# Patient Record
Sex: MALE | Race: WHITE | NOT HISPANIC OR LATINO | Employment: OTHER | URBAN - METROPOLITAN AREA
[De-identification: names, ages, dates, MRNs, and addresses within clinical notes are randomized per-mention and may not be internally consistent; named-entity substitution may affect disease eponyms.]

---

## 2017-06-29 ENCOUNTER — HOSPITAL ENCOUNTER (OUTPATIENT)
Facility: MEDICAL CENTER | Age: 82
End: 2017-06-29
Attending: INTERNAL MEDICINE
Payer: MEDICARE

## 2017-06-29 LAB
BASOPHILS # BLD AUTO: 0.3 % (ref 0–1.8)
BASOPHILS # BLD: 0.02 K/UL (ref 0–0.12)
EOSINOPHIL # BLD AUTO: 0.04 K/UL (ref 0–0.51)
EOSINOPHIL NFR BLD: 0.6 % (ref 0–6.9)
ERYTHROCYTE [DISTWIDTH] IN BLOOD BY AUTOMATED COUNT: 52.8 FL (ref 35.9–50)
HCT VFR BLD AUTO: 40.7 % (ref 42–52)
HGB BLD-MCNC: 13.1 G/DL (ref 14–18)
IMM GRANULOCYTES # BLD AUTO: 0.06 K/UL (ref 0–0.11)
IMM GRANULOCYTES NFR BLD AUTO: 0.8 % (ref 0–0.9)
LYMPHOCYTES # BLD AUTO: 0.56 K/UL (ref 1–4.8)
LYMPHOCYTES NFR BLD: 7.9 % (ref 22–41)
MCH RBC QN AUTO: 31 PG (ref 27–33)
MCHC RBC AUTO-ENTMCNC: 32.2 G/DL (ref 33.7–35.3)
MCV RBC AUTO: 96.4 FL (ref 81.4–97.8)
MONOCYTES # BLD AUTO: 0.3 K/UL (ref 0–0.85)
MONOCYTES NFR BLD AUTO: 4.2 % (ref 0–13.4)
NEUTROPHILS # BLD AUTO: 6.13 K/UL (ref 1.82–7.42)
NEUTROPHILS NFR BLD: 86.2 % (ref 44–72)
NRBC # BLD AUTO: 0 K/UL
NRBC BLD AUTO-RTO: 0 /100 WBC
PLATELET # BLD AUTO: 21 K/UL (ref 164–446)
PMV BLD AUTO: 12.3 FL (ref 9–12.9)
RBC # BLD AUTO: 4.22 M/UL (ref 4.7–6.1)
WBC # BLD AUTO: 7.1 K/UL (ref 4.8–10.8)

## 2017-06-29 PROCEDURE — 85025 COMPLETE CBC W/AUTO DIFF WBC: CPT

## 2020-02-28 ENCOUNTER — HOSPITAL ENCOUNTER (INPATIENT)
Facility: REHABILITATION | Age: 85
LOS: 15 days | DRG: 057 | End: 2020-03-14
Attending: PHYSICAL MEDICINE & REHABILITATION | Admitting: PHYSICAL MEDICINE & REHABILITATION
Payer: MEDICARE

## 2020-02-28 PROBLEM — Z87.39 HISTORY OF GOUT: Status: ACTIVE | Noted: 2020-02-28

## 2020-02-28 PROBLEM — I63.331 CEREBROVASCULAR ACCIDENT (CVA) DUE TO THROMBOSIS OF RIGHT POSTERIOR CEREBRAL ARTERY (HCC): Status: ACTIVE | Noted: 2020-02-28

## 2020-02-28 PROBLEM — I10 ESSENTIAL HYPERTENSION: Status: ACTIVE | Noted: 2020-02-28

## 2020-02-28 PROBLEM — Z86.2 HISTORY OF ITP: Status: ACTIVE | Noted: 2020-02-28

## 2020-02-28 PROCEDURE — 99223 1ST HOSP IP/OBS HIGH 75: CPT | Mod: AI | Performed by: PHYSICAL MEDICINE & REHABILITATION

## 2020-02-28 PROCEDURE — A9270 NON-COVERED ITEM OR SERVICE: HCPCS | Performed by: PHYSICAL MEDICINE & REHABILITATION

## 2020-02-28 PROCEDURE — 770010 HCHG ROOM/CARE - REHAB SEMI PRIVAT*

## 2020-02-28 PROCEDURE — 700111 HCHG RX REV CODE 636 W/ 250 OVERRIDE (IP): Performed by: PHYSICAL MEDICINE & REHABILITATION

## 2020-02-28 PROCEDURE — 700102 HCHG RX REV CODE 250 W/ 637 OVERRIDE(OP): Performed by: PHYSICAL MEDICINE & REHABILITATION

## 2020-02-28 RX ORDER — HYDROXYZINE HYDROCHLORIDE 25 MG/1
50 TABLET, FILM COATED ORAL EVERY 6 HOURS PRN
Status: DISCONTINUED | OUTPATIENT
Start: 2020-02-28 | End: 2020-03-14 | Stop reason: HOSPADM

## 2020-02-28 RX ORDER — POLYETHYLENE GLYCOL 3350 17 G/17G
1 POWDER, FOR SOLUTION ORAL
Status: DISCONTINUED | OUTPATIENT
Start: 2020-02-28 | End: 2020-03-02

## 2020-02-28 RX ORDER — HYDRALAZINE HYDROCHLORIDE 25 MG/1
25 TABLET, FILM COATED ORAL EVERY 8 HOURS PRN
Status: DISCONTINUED | OUTPATIENT
Start: 2020-02-28 | End: 2020-03-14 | Stop reason: HOSPADM

## 2020-02-28 RX ORDER — TRAZODONE HYDROCHLORIDE 50 MG/1
50 TABLET ORAL
Status: DISCONTINUED | OUTPATIENT
Start: 2020-02-28 | End: 2020-03-09

## 2020-02-28 RX ORDER — ACETAMINOPHEN 325 MG/1
650 TABLET ORAL EVERY 4 HOURS PRN
Status: DISCONTINUED | OUTPATIENT
Start: 2020-02-28 | End: 2020-03-14 | Stop reason: HOSPADM

## 2020-02-28 RX ORDER — POLYVINYL ALCOHOL 14 MG/ML
1 SOLUTION/ DROPS OPHTHALMIC PRN
Status: DISCONTINUED | OUTPATIENT
Start: 2020-02-28 | End: 2020-03-14 | Stop reason: HOSPADM

## 2020-02-28 RX ORDER — ECHINACEA PURPUREA EXTRACT 125 MG
2 TABLET ORAL PRN
Status: DISCONTINUED | OUTPATIENT
Start: 2020-02-28 | End: 2020-03-14 | Stop reason: HOSPADM

## 2020-02-28 RX ORDER — LANOLIN ALCOHOL/MO/W.PET/CERES
3 CREAM (GRAM) TOPICAL NIGHTLY PRN
Status: DISCONTINUED | OUTPATIENT
Start: 2020-02-28 | End: 2020-03-09

## 2020-02-28 RX ORDER — AMOXICILLIN 250 MG
2 CAPSULE ORAL 2 TIMES DAILY
Status: DISCONTINUED | OUTPATIENT
Start: 2020-02-28 | End: 2020-03-02

## 2020-02-28 RX ORDER — ONDANSETRON 2 MG/ML
4 INJECTION INTRAMUSCULAR; INTRAVENOUS 4 TIMES DAILY PRN
Status: DISCONTINUED | OUTPATIENT
Start: 2020-02-28 | End: 2020-03-14 | Stop reason: HOSPADM

## 2020-02-28 RX ORDER — LACTULOSE 20 G/30ML
30 SOLUTION ORAL
Status: DISCONTINUED | OUTPATIENT
Start: 2020-02-28 | End: 2020-03-14 | Stop reason: HOSPADM

## 2020-02-28 RX ORDER — ALUMINA, MAGNESIA, AND SIMETHICONE 2400; 2400; 240 MG/30ML; MG/30ML; MG/30ML
20 SUSPENSION ORAL
Status: DISCONTINUED | OUTPATIENT
Start: 2020-02-28 | End: 2020-03-14 | Stop reason: HOSPADM

## 2020-02-28 RX ORDER — ONDANSETRON 4 MG/1
4 TABLET, ORALLY DISINTEGRATING ORAL 4 TIMES DAILY PRN
Status: DISCONTINUED | OUTPATIENT
Start: 2020-02-28 | End: 2020-03-14 | Stop reason: HOSPADM

## 2020-02-28 RX ORDER — BISACODYL 10 MG
10 SUPPOSITORY, RECTAL RECTAL
Status: DISCONTINUED | OUTPATIENT
Start: 2020-02-28 | End: 2020-03-02

## 2020-02-28 RX ORDER — ALLOPURINOL 300 MG/1
300 TABLET ORAL DAILY
Status: DISCONTINUED | OUTPATIENT
Start: 2020-02-29 | End: 2020-03-14 | Stop reason: HOSPADM

## 2020-02-28 RX ADMIN — SENNOSIDES AND DOCUSATE SODIUM 2 TABLET: 8.6; 5 TABLET ORAL at 21:04

## 2020-02-28 RX ADMIN — ENOXAPARIN SODIUM 40 MG: 100 INJECTION SUBCUTANEOUS at 21:05

## 2020-02-28 NOTE — H&P
REHABILITATION HISTORY AND PHYSICAL/POST ADMISSION EVALUATION    2/28/2020  3:17 PM  Chilo Cabrera  [id number]  Admission  2/28/2020  2:11 PM  C Code/Reason for admission: 0.1.1 (L) Body Involvement (R) Brain   Etiologic diagnosis/problem: Cerebrovascular accident (CVA) due to thrombosis of right posterior cerebral artery (HCC)  Chief Complaint: visual trouble    HPI:  The patient is a 84 y.o. male with a past medical history of immune thrombocytopenia, TIAs, carotid artery stenosis, hypertension, cataracts; now admitted for acute inpatient rehabilitation with severe functional debility from acute stroke.      On admission the patient and medical record report initially presented to the hospital on 2/21 with complaints of weakness, was diagnosed with a UTI and sent home with antibiotics.  He returned 3 days later on 2/24 with gait difficulty and visual complaints for which she had a stroke work-up.  MRI showed an acute stroke in the right PCA territory affecting the right temporal and parietal lobes as well as a small area of the right thalamus.  His NIH stroke scale was 10.  An echocardiogram that showed an ejection fraction of 60% and grade 1 diastolic dysfunction.  EKG was negative for atrial fibrillation.  LDL of 48, hemoglobin A1c was not checked. He was treated with IV ceftriaxone for UTI. He is on atenolol at home for hypertension, but was only on PRN hydralazine at [facility].    Patient current reports trouble with his vision but he can't be more specific. He is a poor historian. He is left hand dominant. He doesn't report any weakness or paresthesias. He does wear reading glasses and dentures. He denies being hard of hearing and doesn't have hearing aides. He denies any pain. He hasn't moved his bowels since he was hospitalized, no issues at home with constipation. He denies dysuria, though just had a bladder accident.     Patient was evaluated by Rehab Medicine physician and Physical Therapy and  Occupational Therapy and determined to be appropriate for acute inpatient rehab and was transferred to [facility] on 2020  2:11 PM.    With this acute therapeutic intervention, this patient hopes to improve his functional status, and return to independent living with the supportive care of spouse.    REVIEW OF SYSTEMS:     A complete review of systems was performed and was negative in detail with the exception of items mentioned elsewhere in this document.    PMH:  Past Medical History:   Diagnosis Date   • Carotid artery stenosis    • Cataracts, bilateral    • Gout    • History of ITP    • HTN (hypertension)    • Hypertension    • Immune thrombocytopenia (HCC)    • TIA (transient ischemic attack)        PSH:  Past Surgical History:   Procedure Laterality Date   • CAROTID ENDARTERECTOMY     Denies any other surgeries      Family History   Problem Relation Age of Onset   • Stroke Father     Mother  of old age.    MEDICATIONS:  Current Facility-Administered Medications   Medication Dose   • Respiratory Therapy Consult     • Pharmacy Consult Request ...Pain Management Review 1 Each  1 Each   • acetaminophen (TYLENOL) tablet 650 mg  650 mg   • hydrALAZINE (APRESOLINE) tablet 25 mg  25 mg   • senna-docusate (PERICOLACE or SENOKOT S) 8.6-50 MG per tablet 2 Tab  2 Tab    And   • polyethylene glycol/lytes (MIRALAX) PACKET 1 Packet  1 Packet    And   • magnesium hydroxide (MILK OF MAGNESIA) suspension 30 mL  30 mL    And   • bisacodyl (DULCOLAX) suppository 10 mg  10 mg   • artificial tears ophthalmic solution 1 Drop  1 Drop   • benzocaine-menthol (CEPACOL) lozenge 1 Lozenge  1 Lozenge   • mag hydrox-al hydrox-simeth (MAALOX PLUS ES or MYLANTA DS) suspension 20 mL  20 mL   • ondansetron (ZOFRAN ODT) dispertab 4 mg  4 mg    Or   • ondansetron (ZOFRAN) syringe/vial injection 4 mg  4 mg   • traZODone (DESYREL) tablet 50 mg  50 mg   • sodium chloride (OCEAN) 0.65 % nasal spray 2 Spray  2 Spray   •  "hydrOXYzine HCl (ATARAX) tablet 50 mg  50 mg   • melatonin tablet 3 mg  3 mg   • enoxaparin (LOVENOX) inj 40 mg  40 mg   • lactulose 20 GM/30ML solution 30 mL  30 mL   • docusate sodium (ENEMEEZ) enema 283 mg  283 mg   • [START ON 2/29/2020] Eltrombopag Olamine TABS 50 mg  1 Tab       ALLERGIES:  Pcn [penicillins]    PSYCHOSOCIAL HISTORY:  Pre-mobidly, the patient lived in a one level home with 2 steps to enter, in [place] with spouse.     Patient quit tobacco 37 years ago, drinks 3-4 shots of lliquor either daily or weekly, poor historian, no drugs.    He used to work as a  at [employer].     He was in the [employer]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. 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 He was in combat in [place].     LEVEL OF FUNCTION PRIOR TO DISABILTY:  Independent    LEVEL OF FUNCTION PRIOR TO ADMISSION to [facility]:  Min - mod assist transfers   Ambulates 10 ft with FWW  Mod assist UBD  Max assist LBD  Min assist toileting     CURRENT LEVEL OF FUNCTION:   Same as level of function prior to admission to [facility]    PHYSICAL EXAM:     VITAL SIGNS:   height is 1.951 m (6' 4.8\") and weight is 75 kg (165 lb 5.5 oz). His oral temperature is 36.4 °C (97.6 °F). His blood pressure is 136/75 and his pulse is 84. His respiration is 18 and oxygen saturation is 95%.     GENERAL: No apparent distress, frail, dishelved  HEENT: Normocephalic/atraumatic, PERRL, right lid ptosis, and No nystagmus  CARDIAC: Regular rate and rhythm, normal S1, S2, no murmurs, no peripheral edema   LUNGS: Clear to auscultation, normal respiratory effort, on room air   ABDOMINAL: bowel sounds present, soft, nontender and nondistended    EXTREMITIES: no edema or no calf tenderness bilaterally  MSK: No joint swelling    NEURO:    Mental status: alert, oriented to reason for admission  Speech: fluent, no aphasia or dysarthria    CRANIAL NERVES:  2,3: PERRL, left visual field cut to " midline  3,4,6: EOMI bilaterally, no nystagmus or diplopia  5: intact in all branches  7: mild decreased left nasolabial fold  8: hearing grossly intact, hard to hear softer sounds  9,10: symmetric palate elevation  11: SCM/Trapezius strength 5/5 bilaterally  12: tongue protrudes midline    Motor:  Shoulder flexors:  Right -  5/5, Left -  5/5  Elbow flexors:  Right -  5/5, Left -  5/5  Elbow extensors:  Right -  5/5, Left -  5/5  Symmetrical   Hip flexors:  Right -  5/5, Left -  5/5  Knee ext:  Right -  5/5, Left -  5/5  Dorsiflexors:  Right -  5/5, Left -  5/5  EHL:  Right -  5/5, Left -  5/5  Plantar flexors:  Right -  5/5, Left -  5/5   Positive left Pronator drift    Sensory:   intact to light touch through out    RADIOLOGY:    MRI report from outside hospital with R parietal/occipital/thalamic stroke in the right PCA territory.           PRIMARY REHAB DIAGNOSIS:    This patient is a 84 y.o. male admitted for acute inpatient rehabilitation with Cerebrovascular accident (CVA) due to thrombosis of right posterior cerebral artery (HCC).    IMPAIRMENTS:   Cognitive  ADLs/IADLs  Mobility    SECONDARY DIAGNOSIS/MEDICAL CO-MORBIDITIES AFFECTING FUNCTION:    Hypertension  Immune thrombocytopenia  Grade 1 diastolic dysfunction  History of gout    RELEVANT CHANGES SINCE PREADMISSION EVALUATION:    Status unchanged    The patient's rehabilitation potential is Good  The patient's medical prognosis is good    PLAN:   Discussion and Recommendations, discussed with the patient and/or family:   1. The patient requires an acute inpatient rehabilitation program with a coordinated program of care at an intensity and frequency not available at a lower level of care. This recommendation is substantiated by the patient's medical physicians who recommend that the patient's intervention and assessment of medical issues needs to be done at an acute level of care for patient's safety and maximum outcome.     2. A coordinated program  of care will be supplied by an interdisciplinary team of physical therapy, occupational therapy, rehab physician, rehab nursing, and, if needed, speech therapy and rehab psychology. Rehab team presents a patient-specific rehabilitation and education program concentrating on prevention of future problems related to accessibility, mobility, skin, bowel, bladder, sexuality, and psychosocial and medical/surgical problems.     3. Need for Rehabilitation Physician: The rehab physician will be evaluating the patient on a multi-weekly basis to help coordinate the program of care. The rehab physician communicates between medical physicians, therapists, and nurses to maximize the patient's potential outcome. Specific areas in which the rehab physician will be providing daily assessment include the following:   A. Assessing the patient's heart rate and blood pressure response (vitals monitoring) to activity and making adjustments in medications or conservative measures as needed.   B. The rehab physician will be assessing the frequency at which the program can be increased to allow the patient to reach optimal functional outcome.   C. The rehab physician will also provide assessments in daily skin care, especially in light of patient's impairments in mobility.   D. The rehab physician will provide special expertise in understanding how to work with functional impairment and recommend appropriate interventions, compensatory techniques, and education that will facilitate the patient's outcome.     4. Rehab R.N.   The rehab RN will be working with patient to carry over in room mobility and activities of daily living when the patient is not in 3 hours of skilled therapy. Rehab nursing will be working in conjunction with rehab physician to address all the medical issues above and continue to assess laboratory work and discuss abnormalities with the treating physicians, assess vitals, and response to activity, and discuss and report  abnormalities with the rehab physician. Rehab RN will also continue daily skin care, supervise bladder/bowel program, instruct in medication administration, and ensure patient safety.     5. Therapies to treat at intensity and frequency of (may change after completion of evaluation by all therapeutic disciplines):       PT:  Physical therapy to address mobility, transfer, gait training and evaluation for adaptive equipment needs 1hour/day at least 5 days/week for the duration of the ELOS (see below)       OT:  Occupational therapy to address ADLs, self-care, home management training, functional mobility/transfers and assistive device evaluation, and community re-integration 1hour/day at least 5 days/week for the duration of the ELOS (see below).        ST/Dysphagia:  Speech therapy to address speech, language, and cognitive deficits as well as swallowing difficulties with retraining/dysphagia management and community re-integration with comprehension, expression, cognitive training 1hour/day at least 5 days/week for the duration of the ELOS (see below).     6. Medical management / Rehabilitation Issues/Adverse Potential affecting function as part of rehabilitation plan.    Hypertension  Not currently on medications  Was on atenolol at home  Monitor need to add back    Immune thrombocytopenia  Continue home medication, Eltrombag 50 mg QD  Outpatient follow up with Dr. Abdalla    Grade 1 diastolic dysfunction  Monitor fluid status    History of gout  Continue allopurinol     I performed a complete drug regimen review and did not identify any potential clinically significant medication issues.    The patient's CODE STATUS was confirmed as FULL CODE with the patient at bedside.     REHABILITATION ISSUES/ADVERSE POTENTIAL:  1.  CVA (Cerebrovascular Accident): Cont full dose aspirin for secondary prophylaxis as well as lipid and blood pressure management. Patient demonstrates functional deficits in strength, balance,  coordination, and ADL's. Patient is admitted to [facility] for comprehensive rehabilitation therapy as described below.   Rehabilitation nursing monitors bowel and bladder control, educates on medication administration, co-morbidities and monitors patient safety.    2.  DVT prophylaxis:  Patient is on Lovenox for anticoagulation upon transfer. Encourage OOB. Monitor daily for signs and symptoms of DVT including but not limited to swelling and pain to prevent the development of DVT that may interfere with therapies.    3.  Pain: No issues with pain currently / Controlled with as needed oral analgesics.    4.  Nutrition/Dysphagia: Dietician monitors nutrient intake, recommend supplements prn and provide nutrition education to pt/family to promote optimal nutrition for wound healing/recovery.     5.  Bladder/bowel:  Start bowel and bladder program, to prevent constipation, urinary retention (which may lead to UTI), and urinary incontinence (which will impact upon pt's functional independence).   - TV Q3h while awake with post void bladder scans, I&O cath for PVRs >400  - up to commode after meal     6.  Skin/dermal ulcer prophylaxis: Monitor for new skin conditions with q.2 h. turns as required to prevent the development of skin breakdown.     7.  Cognition/Behavior:  Psychologist Dr. Sutton provides adjustment counseling to illness and psychosocial barriers that may be potential barriers to rehabilitation.     8. Respiratory therapy: RT performs O2 management prn, breathing retraining, pulmonary hygiene and bronchospasm management prn to optimize participation in therapies.    Pt was seen today for 72 min, and entire time spent in face-to-face contact was >50% in counseling and coordination of care as detailed in A/P above.        GOALS/EXPECTED LEVEL OF FUNCTION BASED ON CURRENT MEDICAL AND FUNCTIONAL STATUS (may change based on patient's medical status and rate of impairment  recovery):  Transfers:   Supervision  Mobility/Gait:   Supervision  ADL's:   Minimal Assistance  Cognition:  Supervision    DISPOSITION: Discharge to pre-morbid independent living setting with the supportive care of patient's spouse.      ELOS: 14-21 days    Elba Chase M.D.  Physical Medicine and Rehabilitation

## 2020-02-29 LAB
25(OH)D3 SERPL-MCNC: 15 NG/ML (ref 30–100)
ALBUMIN SERPL BCP-MCNC: 3.7 G/DL (ref 3.2–4.9)
ALBUMIN/GLOB SERPL: 1.6 G/DL
ALP SERPL-CCNC: 69 U/L (ref 30–99)
ALT SERPL-CCNC: 15 U/L (ref 2–50)
ANION GAP SERPL CALC-SCNC: 7 MMOL/L (ref 0–11.9)
AST SERPL-CCNC: 31 U/L (ref 12–45)
BASOPHILS # BLD AUTO: 1 % (ref 0–1.8)
BASOPHILS # BLD: 0.05 K/UL (ref 0–0.12)
BILIRUB SERPL-MCNC: 1.6 MG/DL (ref 0.1–1.5)
BUN SERPL-MCNC: 13 MG/DL (ref 8–22)
CALCIUM SERPL-MCNC: 8.9 MG/DL (ref 8.5–10.5)
CHLORIDE SERPL-SCNC: 105 MMOL/L (ref 96–112)
CO2 SERPL-SCNC: 26 MMOL/L (ref 20–33)
CREAT SERPL-MCNC: 0.88 MG/DL (ref 0.5–1.4)
EOSINOPHIL # BLD AUTO: 0.18 K/UL (ref 0–0.51)
EOSINOPHIL NFR BLD: 3.6 % (ref 0–6.9)
ERYTHROCYTE [DISTWIDTH] IN BLOOD BY AUTOMATED COUNT: 49.2 FL (ref 35.9–50)
EST. AVERAGE GLUCOSE BLD GHB EST-MCNC: 111 MG/DL
GLOBULIN SER CALC-MCNC: 2.3 G/DL (ref 1.9–3.5)
GLUCOSE SERPL-MCNC: 93 MG/DL (ref 65–99)
HBA1C MFR BLD: 5.5 % (ref 0–5.6)
HCT VFR BLD AUTO: 39.6 % (ref 42–52)
HGB BLD-MCNC: 13.8 G/DL (ref 14–18)
IMM GRANULOCYTES # BLD AUTO: 0.02 K/UL (ref 0–0.11)
IMM GRANULOCYTES NFR BLD AUTO: 0.4 % (ref 0–0.9)
LYMPHOCYTES # BLD AUTO: 0.86 K/UL (ref 1–4.8)
LYMPHOCYTES NFR BLD: 17.4 % (ref 22–41)
MAGNESIUM SERPL-MCNC: 1.7 MG/DL (ref 1.5–2.5)
MCH RBC QN AUTO: 32.2 PG (ref 27–33)
MCHC RBC AUTO-ENTMCNC: 34.8 G/DL (ref 33.7–35.3)
MCV RBC AUTO: 92.3 FL (ref 81.4–97.8)
MONOCYTES # BLD AUTO: 0.35 K/UL (ref 0–0.85)
MONOCYTES NFR BLD AUTO: 7.1 % (ref 0–13.4)
NEUTROPHILS # BLD AUTO: 3.49 K/UL (ref 1.82–7.42)
NEUTROPHILS NFR BLD: 70.5 % (ref 44–72)
NRBC # BLD AUTO: 0 K/UL
NRBC BLD-RTO: 0 /100 WBC
NT-PROBNP SERPL IA-MCNC: 230 PG/ML (ref 0–125)
PLATELET # BLD AUTO: 154 K/UL (ref 164–446)
PMV BLD AUTO: 12.2 FL (ref 9–12.9)
POTASSIUM SERPL-SCNC: 3.2 MMOL/L (ref 3.6–5.5)
PROT SERPL-MCNC: 6 G/DL (ref 6–8.2)
RBC # BLD AUTO: 4.29 M/UL (ref 4.7–6.1)
SODIUM SERPL-SCNC: 138 MMOL/L (ref 135–145)
WBC # BLD AUTO: 5 K/UL (ref 4.8–10.8)

## 2020-02-29 PROCEDURE — A9270 NON-COVERED ITEM OR SERVICE: HCPCS | Performed by: PHYSICAL MEDICINE & REHABILITATION

## 2020-02-29 PROCEDURE — 97530 THERAPEUTIC ACTIVITIES: CPT

## 2020-02-29 PROCEDURE — 92523 SPEECH SOUND LANG COMPREHEN: CPT

## 2020-02-29 PROCEDURE — 700111 HCHG RX REV CODE 636 W/ 250 OVERRIDE (IP): Performed by: PHYSICAL MEDICINE & REHABILITATION

## 2020-02-29 PROCEDURE — 700102 HCHG RX REV CODE 250 W/ 637 OVERRIDE(OP): Performed by: PHYSICAL MEDICINE & REHABILITATION

## 2020-02-29 PROCEDURE — 82306 VITAMIN D 25 HYDROXY: CPT

## 2020-02-29 PROCEDURE — 97166 OT EVAL MOD COMPLEX 45 MIN: CPT

## 2020-02-29 PROCEDURE — 83880 ASSAY OF NATRIURETIC PEPTIDE: CPT

## 2020-02-29 PROCEDURE — 36415 COLL VENOUS BLD VENIPUNCTURE: CPT

## 2020-02-29 PROCEDURE — 770010 HCHG ROOM/CARE - REHAB SEMI PRIVAT*

## 2020-02-29 PROCEDURE — 85025 COMPLETE CBC W/AUTO DIFF WBC: CPT

## 2020-02-29 PROCEDURE — 94760 N-INVAS EAR/PLS OXIMETRY 1: CPT

## 2020-02-29 PROCEDURE — 99232 SBSQ HOSP IP/OBS MODERATE 35: CPT | Performed by: PHYSICAL MEDICINE & REHABILITATION

## 2020-02-29 PROCEDURE — 83735 ASSAY OF MAGNESIUM: CPT

## 2020-02-29 PROCEDURE — 97162 PT EVAL MOD COMPLEX 30 MIN: CPT

## 2020-02-29 PROCEDURE — 83036 HEMOGLOBIN GLYCOSYLATED A1C: CPT

## 2020-02-29 PROCEDURE — 80053 COMPREHEN METABOLIC PANEL: CPT

## 2020-02-29 RX ORDER — VITAMIN B COMPLEX
2000 TABLET ORAL DAILY
Status: DISCONTINUED | OUTPATIENT
Start: 2020-02-29 | End: 2020-03-14 | Stop reason: HOSPADM

## 2020-02-29 RX ORDER — POTASSIUM CHLORIDE 20 MEQ/1
40 TABLET, EXTENDED RELEASE ORAL ONCE
Status: COMPLETED | OUTPATIENT
Start: 2020-02-29 | End: 2020-02-29

## 2020-02-29 RX ADMIN — ENOXAPARIN SODIUM 40 MG: 100 INJECTION SUBCUTANEOUS at 20:56

## 2020-02-29 RX ADMIN — SENNOSIDES AND DOCUSATE SODIUM 2 TABLET: 8.6; 5 TABLET ORAL at 10:33

## 2020-02-29 RX ADMIN — ALLOPURINOL 300 MG: 300 TABLET ORAL at 10:33

## 2020-02-29 RX ADMIN — TRAZODONE HYDROCHLORIDE 50 MG: 50 TABLET ORAL at 22:12

## 2020-02-29 RX ADMIN — ASPIRIN 325 MG: 325 TABLET, COATED ORAL at 10:33

## 2020-02-29 RX ADMIN — MELATONIN 3 MG: at 20:55

## 2020-02-29 RX ADMIN — SENNOSIDES AND DOCUSATE SODIUM 2 TABLET: 8.6; 5 TABLET ORAL at 20:55

## 2020-02-29 RX ADMIN — MELATONIN 2000 UNITS: at 15:10

## 2020-02-29 RX ADMIN — POTASSIUM CHLORIDE 40 MEQ: 1500 TABLET, EXTENDED RELEASE ORAL at 15:09

## 2020-02-29 ASSESSMENT — ACTIVITIES OF DAILY LIVING (ADL): TOILETING: INDEPENDENT

## 2020-02-29 ASSESSMENT — COPD QUESTIONNAIRES
COPD SCREENING SCORE: 4
DURING THE PAST 4 WEEKS HOW MUCH DID YOU FEEL SHORT OF BREATH: NONE/LITTLE OF THE TIME
HAVE YOU SMOKED AT LEAST 100 CIGARETTES IN YOUR ENTIRE LIFE: YES
DO YOU EVER COUGH UP ANY MUCUS OR PHLEGM?: NO/ONLY WITH OCCASIONAL COLDS OR INFECTIONS

## 2020-02-29 ASSESSMENT — LIFESTYLE VARIABLES
PACK_YEARS: 26
AVERAGE NUMBER OF DAYS PER WEEK YOU HAVE A DRINK CONTAINING ALCOHOL: 4
ON A TYPICAL DAY WHEN YOU DRINK ALCOHOL HOW MANY DRINKS DO YOU HAVE: 3
EVER HAD A DRINK FIRST THING IN THE MORNING TO STEADY YOUR NERVES TO GET RID OF A HANGOVER: NO
ALCOHOL_USE: YES
HOW MANY TIMES IN THE PAST YEAR HAVE YOU HAD 5 OR MORE DRINKS IN A DAY: 0
EVER_SMOKED: YES
HAVE YOU EVER FELT YOU SHOULD CUT DOWN ON YOUR DRINKING: NO
CONSUMPTION TOTAL: NEGATIVE
TOTAL SCORE: 0
TOTAL SCORE: 0
HAVE PEOPLE ANNOYED YOU BY CRITICIZING YOUR DRINKING: NO
TOTAL SCORE: 0
EVER FELT BAD OR GUILTY ABOUT YOUR DRINKING: NO

## 2020-02-29 ASSESSMENT — BRIEF INTERVIEW FOR MENTAL STATUS (BIMS)
ASKED TO RECALL BED: NO, COULD NOT RECALL
BIMS SUMMARY SCORE: 3
BIMS SUMMARY SCORE: 6
INITIAL REPETITION OF BED BLUE SOCK - FIRST ATTEMPT: 3
ASKED TO RECALL SOCK: NO, COULD NOT RECALL
WHAT YEAR IS IT: MISSED BY > 5 YEARS
INITIAL REPETITION OF BED BLUE SOCK - FIRST ATTEMPT: 3
WHAT MONTH IS IT: ACCURATE WITHIN 5 DAYS
WHAT DAY OF THE WEEK IS IT: INCORRECT
WHAT DAY OF THE WEEK IS IT: INCORRECT
WHAT MONTH IS IT: MISSED BY > 1 MONTH
WHAT YEAR IS IT: MISSED BY > 5 YEARS
ASKED TO RECALL SOCK: YES, AFTER CUEING (SOMETHING TO WEAR")"
ASKED TO RECALL BED: NO, COULD NOT RECALL
ASKED TO RECALL BLUE: NO, COULD NOT RECALL
ASKED TO RECALL BLUE: NO, COULD NOT RECALL

## 2020-02-29 ASSESSMENT — PATIENT HEALTH QUESTIONNAIRE - PHQ9
SUM OF ALL RESPONSES TO PHQ9 QUESTIONS 1 AND 2: 0
2. FEELING DOWN, DEPRESSED, IRRITABLE, OR HOPELESS: NOT AT ALL
1. LITTLE INTEREST OR PLEASURE IN DOING THINGS: NOT AT ALL

## 2020-02-29 NOTE — THERAPY
Physical Therapy   Initial Evaluation     Patient Name: Chilo Cabrera  Age:  84 y.o., Sex:  male  Medical Record #: [id number]  Today's Date: 2/29/2020     Subjective    Patient received in bed and agreeable to therapy. States that it is November 3rd.     Objective       02/29/20 1031   Prior Living Situation   Prior Services None   Housing / Facility 1 Story House   Steps Into Home 2  (patient reports 1)   Steps In Home 0   Rail Both Rail (Steps into Home)   Elevator No   Bathroom Set up Grab Bars;Shower Chair;Bathtub / Shower Combination   Equipment Owned Front-Wheel Walker;Single Point Cane;Tub / Shower Seat;Grab Bar(s) In Tub / Shower;Wheelchair   Lives with - Patient's Self Care Capacity Spouse   Comments poor historian, will benefit from clarification   Prior Level of Functional Mobility   Bed Mobility Independent   Transfer Status Independent   Ambulation Independent   Distance Ambulation (Feet) 1000   Assistive Devices Used None   Wheelchair Other (Comments)  (n/a)   Stairs Independent   Comments pt reports previously driving   IRF-SELVIN:  Prior Functioning: Everyday Activities   Self Care Independent   Indoor Mobility (Ambulation) Independent   Stairs Independent   Functional Cognition Independent   Prior Device Use None of the given options   Vitals   Pulse 90   Patient BP Position Sitting   Blood Pressure  133/66   Pulse Oximetry 96 %   O2 Delivery Device None - Room Air   Pain 0 - 10 Group   Therapist Pain Assessment 0;Post Activity Pain Same as Prior to Activity   Cognition    Cognition / Consciousness X   Orientation Level Not Oriented to Year;Not Oriented to Month;Not Oriented to Day  (states it is november 3rd)   Level of Consciousness Alert   Comments able to orient to year with field of two; states he is in the hospital because he had a stroke   Passive ROM Lower Body   Passive ROM Lower Body WDL   Active ROM Lower Body    Active ROM Lower Body  WDL   Strength Lower Body   Lower Body Strength  WDL    Comments grossly 4+/5, impaired motor control on LLE   Sensation Lower Body   Lower Extremity Sensation   WDL   Comments intact to light touch, tactile localization, bilateral simultaneous stimulation   Lower Body Muscle Tone   Lower Body Muscle Tone  WDL   Balance Assessment   Sitting Balance (Static) Fair +   Sitting Balance (Dynamic) Fair -   Standing Balance (Static) Fair -   Standing Balance (Dynamic) Fair -   Bed Mobility    Supine to Sit Moderate Assist   Sit to Supine Moderate Assist   Sit to Stand Contact Guard Assist   Rolling Supervised   Neurological Concerns   Neurological Concerns No   Coordination Lower Body    Coordination Lower Body  WDL   IRF-SELVIN:  Roll Left and Right   Assistance Needed Supervision   Physical Assistance Level No physical assistance or only touching/steadying assist   CARE Score 4   Discharge Goal:  Assistance Needed Independent   Discharge Goal:  Physical Assistance Level No physical assistance or only touching/steadying assist   Discharge Goal:  Score 6   IRF-SELVIN:  Sit to Lying   Assistance Needed Physical assistance   Physical Assistance Level 50%-74%   CARE Score 2   Discharge Goal:  Assistance Needed Independent   Discharge Goal:  Physical Assistance Level No physical assistance or only touching/steadying assist   Discharge Goal:  Score 6   IRF-SELVIN:  Lying to Sitting on Side of Bed   Assistance Needed Physical assistance   Physical Assistance Level 50%-74%   CARE Score 2   Discharge Goal:  Assistance Needed Independent   Discharge Goal:  Physical Assistance Level No physical assistance or only touching/steadying assist   Discharge Goal:  Score 6   IRF-SELVIN:  Sit to Stand   Assistance Needed Incidental touching   Physical Assistance Level No physical assistance or only touching/steadying assist   CARE Score 4   Discharge Goal:  Assistance Needed Independent   Discharge Goal:  Physical Assistance Level No physical assistance or only touching/steadying assist   Discahrge Goal:   Score 6   IRF-SELVIN:  Chair/Bed-to-Chair Transfer   Assistance Needed Incidental touching   Physical Assistance Level No physical assistance or only touching/steadying assist   CARE Score 4   Discharge Goal:  Assistance Needed Independent   Discharge Goal:  Physical Assistance Level No physical assistance or only touching/steadying assist   Discharge Goal:  Score 6   IRF-SELVIN:  Car Transfer   Reason if not Attempted Safety concerns   CARE Score 88   Discharge Goal:  Assistance Needed Supervision   Discharge Goal:  Physical Assistance Level No physical assistance or only touching/steadying assist   Discharge Goal:  Score 4   IRF SELVIN:  Walking   Does the Patient Walk? Yes   IRF SELVIN:  Walk 10 Feet   Assistance Needed Incidental touching   Physical Assistance Level No physical assistance or only touching/steadying assist   CARE Score 4   Discharge Goal:  Assistance Needed Supervision   Discharge Goal:  Physical Assistance Level No physical assistance or only touching/steadying assist   Discharge Goal:  Score 4   IRF-SELVIN:  Walk 50 Feet with Two Turns   Assistance Needed Incidental touching   Physical Assistance Level No physical assistance or only touching/steadying assist   CARE Score 4   Discharge Goal:  Assistance Needed Supervision   Discharge Goal:  Physical Assistance Level No physical assistance or only touching/steadying assist   Discharge Goal:  Score 4   IRF-SELVIN:  Walk 150 Feet   Assistance Needed Incidental touching   Physical Assistance Level No physical assistance or only touching/steadying assist   CARE Score 4   Discharge Goal:  Assistance Needed Supervision   Discharge Goal:  Physical Assistance Level No physical assistance or only touching/steadying assist   Discharge Goal:  Score 4   IRF SELVIN:  Walking 10 Feet on Uneven Surfaces   Reason if not Attempted Safety concerns   CARE Score 88   Discharge Goal:  Assistance Needed Supervision   Discharge Goal:  Physical Assistance Level No physical assistance or  only touching/steadying assist   Discharge Goal:  Score 4   IRF SELVIN:  1 Step (Curb)   Reason if not Attempted Safety concerns   CARE Score 88   Discharge Goal:  Assistance Needed Supervision   Discharge Goal:  Physical Assistance Level No physical assistance or only touching/steadying assist   Discharge Goal:  Score 4   IRF-SELVIN:  4 Steps   Reason if not Attempted Safety concerns   CARE Score 88   Discharge Goal:  Assistance Needed Supervision   Discharge Goal:  Physical Assistance Level No physical assistance or only touching/steadying assist   Discharge Goal:  Score 4   IRF SELVIN:  12 Steps   Reason if not Attempted Safety concerns   CARE Score 88   Discharge Goal:  Assistance Needed Supervision   Discharge Goal:  Physical Assistance Level No physical assistance or only touching/steadying assist   Discharge Goal:  Score 4   IRF SELVIN:  Picking Up Object   Reason if not Attempted Safety concerns   CARE Score 88   Discharge Goal:  Assistance Needed Supervision;Adaptive equipment   Discharge Goal:  Physical Assistance Level No physical assistance or only touching/steadying assist   Discharge Goal:  Score 4   IRF-SELVIN:  Wheel 50 Feet with Two Turns   Indicate the Type of Wheelchair or Scooter Used Manual   Assistance Needed Supervision   Physical Assistance Level No physical assistance or only touching/steadying assist   CARE Score 4   Discharge Goal:  Assistance Needed Independent   Discharge Goal:  Physical Assistance Level No physical assistance or only touching/steadying assist   Discharge Goal:  Score 6   IRF-SELVIN:  Wheel 150 Feet   Indicate the Type of Wheelchair or Scooter Used Manual   Assistance Needed Physical assistance   Physical Assistance Level 50%-74%   CARE Score 2   Discharge Goal:  Assistance Needed Physical assistance   Discharge Goal:  Physical Assistance Level Less than 25%   Discharge Goal:  Score 3   Problem List    Problems Impaired Bed Mobility;Impaired Transfers;Impaired Ambulation;Impaired  Balance;Impaired Vision;Decreased Activity Tolerance;Safety Awareness Deficits / Cognition;Motor Planning / Sequencing   Precautions   Precautions Fall Risk   Comments L hemianopsia (L visual field cut)   Current Discharge Plan   Current Discharge Plan Return to Prior Living Situation   Interdisciplinary Plan of Care Collaboration   IDT Collaboration with  Occupational Therapist   Patient Position at End of Therapy Seated;Self Releasing Lap Belt Applied  (T-Dine)   Collaboration Comments CLOF, POC   Benefit   Therapy Benefit Patient Would Benefit from Inpatient Rehabilitation Physical Therapy to Maximize Functional [place] with ADLs, IADLs and Mobility.   PT Total Time Spent   PT Individual Total Time Spent (Mins) 60   PT Charge Group   PT Therapeutic Activities 1   PT Evaluation PT Evaluation Mod       FIM Bed/Chair/Wheelchair Transfers Score: 3 - Moderate Assistance  Bed/Chair/Wheelchair Transfers Description:  Requires lift, Supervision for safety, Increased time(Mod A supine>sit on bed, CGA-Min A stand step transfers with FWW vs. stand pivot no AD)    FIM Walking Score:  4 - Minimal Assistance  Walking Description:  Walker, Requires incidental assist, Verbal cueing, Safety concerns, Extra time(150 ftx1 with FWW and CGA. Cues for L hemianopsia, increasing LLE step length, and FWW management.)    FIM Wheelchair Score:  2 - Max Assistance  Wheelchair Description:  Safety concerns, Requires incidental assist, Supervision for safety, Verbal cueing(50 ftx1 with BUEs propelling and supervision, verbal cues for L hemianopsia; requires assist for longer distances.)    FIM Stairs Score:  0 - Not tested,unsafe activity  Stairs Description:       Vision Assessment: visual fields impaired with L homonymous hemianopsia; EOMs WFL; smooth pursuit with mild saccadic breakdown in horizontal and vertical planes; hypermetric saccades requiring 1-2 corrective saccades to reach target in all directions; convergence  intact.    Assessment  Patient is 84 y.o. 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 He initially presented to the hospital on 2/21 with weakness and was diagnosed with a UTI and sent home with antibiotics. MRI revealed CVA affecting the R temporal and parietal lobes, as well as a small area of R thalamus.  Additional factors influencing patient status / progress (ie: cognitive factors, co-morbidities, social support, etc): he is very motivated to return to prior level of independence and has a supportive wife. Potential barriers include comorbidities and L hemianopsia affecting safe mobility. Patient will benefit from skilled physical therapy to address current impairments and maximize functional mobility and safety prior to discharge.        Plan  Recommend Physical Therapy  minutes per day 5-7 days per week for 14-21 days for the following treatments:  PT Group Therapy, PT Gait Training, PT Therapeutic Exercises, PT Neuro Re-Ed/Balance, PT Therapeutic Activity and PT Evaluation.    Goals:  Long term and short term goals have been discussed with patient and they are in agreement.    Physical Therapy Problems     Problem: Mobility     Dates: Start: 02/29/20       Description:     Goal: STG-Within one week, patient will ambulate household distance     Dates: Start: 02/29/20   Expected End: 03/07/20       Description: 1) Individualized goal:  150 ft with FWW and SBA.  2) Interventions:  PT Group Therapy, PT Gait Training, PT Therapeutic Exercises, PT Neuro Re-Ed/Balance, PT Therapeutic Activity and PT Evaluation             Goal: STG-Within one week, patient will ambulate up/down a curb     Dates: Start: 02/29/20   Expected End: 03/07/20       Description: 1) Individualized goal:  With FWW and CGA.  2) Interventions:  PT Group Therapy, PT Gait Training, PT Therapeutic Exercises, PT Neuro Re-Ed/Balance, PT Therapeutic Activity and PT Evaluation           Goal: STG-Within one week, patient will ascend and  descend four to six stairs     Dates: Start: 02/29/20   Expected End: 03/07/20       Description: 1) Individualized goal:  With B rails and CGA.  2) Interventions:  PT Group Therapy, PT Gait Training, PT Therapeutic Exercises, PT Neuro Re-Ed/Balance, PT Therapeutic Activity and PT Evaluation                   Problem: Mobility Transfers     Dates: Start: 02/29/20       Description:     Goal: STG-Within one week, patient will perform bed mobility     Dates: Start: 02/29/20   Expected End: 03/07/20       Description: 1) Individualized goal:  With SBA and bed functions as needed.  2) Interventions:  PT Group Therapy, PT Gait Training, PT Therapeutic Exercises, PT Neuro Re-Ed/Balance, PT Therapeutic Activity and PT Evaluation             Goal: STG-Within one week, patient will transfer bed to chair     Dates: Start: 02/29/20   Expected End: 03/07/20       Description: 1) Individualized goal:  With FWW and SBA.  2) Interventions: PT Group Therapy, PT Gait Training, PT Therapeutic Exercises, PT Neuro Re-Ed/Balance, PT Therapeutic Activity and PT Evaluation                 Problem: PT-Long Term Goals     Dates: Start: 02/29/20       Description:     Goal: LTG-By discharge, patient will ambulate     Dates: Start: 02/29/20   Expected End: 03/21/20       Description: 1) Individualized goal:  150 ft with LRAD and supervision.  2) Interventions: PT Group Therapy, PT Gait Training, PT Therapeutic Exercises, PT Neuro Re-Ed/Balance, PT Therapeutic Activity and PT Evaluation             Goal: LTG-By discharge, patient will transfer one surface to another     Dates: Start: 02/29/20   Expected End: 03/21/20       Description: 1) Individualized goal:  With LRAD mod I.  2) Interventions:  PT Group Therapy, PT Gait Training, PT Therapeutic Exercises, PT Neuro Re-Ed/Balance, PT Therapeutic Activity and PT Evaluation             Goal: LTG-By discharge, patient will ambulate up/down 4-6 stairs     Dates: Start: 02/29/20   Expected End:  03/21/20       Description: 1) Individualized goal:  With B rails and supervision.  2) Interventions:  PT Group Therapy, PT Gait Training, PT Therapeutic Exercises, PT Neuro Re-Ed/Balance, PT Therapeutic Activity and PT Evaluation             Goal: LTG-By discharge, patient will transfer in/out of a car     Dates: Start: 02/29/20   Expected End: 03/21/20       Description: 1) Individualized goal:  With LRAD and supervision.  2) Interventions:  PT Group Therapy, PT Gait Training, PT Therapeutic Exercises, PT Neuro Re-Ed/Balance, PT Therapeutic Activity and PT Evaluation           Goal: LTG-By discharge, patient will     Dates: Start: 02/29/20   Expected End: 03/21/20       Description: 1) Individualized goal:  Perform bed mobility with no adaptations independently.  2) Interventions: PT Group Therapy, PT Gait Training, PT Therapeutic Exercises, PT Neuro Re-Ed/Balance, PT Therapeutic Activity and PT Evaluation             Goal: LTG-By discharge, patient will     Dates: Start: 02/29/20   Expected End: 03/21/20       Description: 1) Individualized goal:  Navigate up/down curb with LRAD and supervision.  2) Interventions:  PT Group Therapy, PT Gait Training, PT Therapeutic Exercises, PT Neuro Re-Ed/Balance, PT Therapeutic Activity and PT Evaluation

## 2020-02-29 NOTE — THERAPY
"Occupational Therapy   Initial Evaluation     Patient Name: Chilo Cabrera  Age:  84 y.o., Sex:  male  Medical Record #: [id number]  Today's Date: 2/29/2020     Subjective    \"Year? Uh, I think February, March, April....\"    \"My wife worked here as a nursing assistant for years... here at [facility].\"       Objective       02/29/20 0701   Prior Living Situation   Prior Services None   Housing / Facility 1 Story House   Steps Into Home 2   Rail Unable To Determine At This Time   Elevator No   Bathroom Set up   (pt unable to determine with education on difference)   Equipment Owned Unable to Determine At This Time   Lives with - Patient's Self Care Capacity Spouse   Comments pt poor historian   Prior Level of ADL Function   Self Feeding Independent   Grooming / Hygiene Independent   Bathing Independent   Dressing Independent   Toileting Independent   Prior Level of IADL Function   Medication Management Unable To Determine At This Time   Laundry Independent   Kitchen Mobility Independent   Finances Independent   Home Management Independent   Shopping Independent   Prior Level Of Mobility Unable to Determine At This Time   Driving / Transportation Unable To Determine At This Time   Occupation (Pre-Hospital Vocational) Retired Due To Age  ([employer]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; 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[facility] )   IRF-SELVIN:  Prior Functioning: Everyday Activities   Self Care Independent   Indoor Mobility (Ambulation) Independent   Stairs Independent   Functional Cognition Independent   Prior Device Use None of the given options   Pain 0 - 10 Group   Pain Rating Scale (NPRS) 0   Cognition    Orientation Level Not Oriented to Day;Not Oriented to Year;Not Oriented to Time   Level of Consciousness Alert   Ability To Follow Commands 1 Step   Safety Awareness Impaired   Attention Impaired   IRF-SELVIN:  Cognitive Pattern Assessment   Cognitive Pattern Assessment Used BIMS   IRF-SELVIN:  Brief Interview for Mental Status (BIMS)   Repetition of Three Words " "(First Attempt) 3   Temporal Orientation: Able to Report the Correct Year Missed by > 5 years   Temporal Orientation: Able to Report the Correct Month Accurate within 5 days   Temporal Orientation: Able to Report the Correct Day of the Week Incorrect   Able to Recall \"Sock\" Yes, after cueing (\"something to wear\")   Able to Recall \"Blue\" No, could not recall   Able to Recall \"Bed\" No, could not recall   BIMS Summary Score 6   Vision Screen   Vision Not tested  (L hemianopsia; PT tested thoroughly. See PT note)   Active ROM Upper Body   Active ROM Upper Body  WDL   Dominant Hand Right   Strength Upper Body   Rt Shoulder Flexion Strength 4 (G)   Rt Shoulder Extension Strength 4 (G)   Rt Elbow Flexion Strength 4 (G)   Rt Elbow Extension Strength 4 (G)   Lt Shoulder Flexion Strength 4 (G)   Lt Shoulder Extension Strength 4 (G)   Lt Elbow Flexion Strength 4 (G)   Lt Elbow Extension Strength 4 (G)   Sensation Upper Body   Comments per pt, no change   Balance Assessment   Sitting Balance (Static) Fair +   Sitting Balance (Dynamic) Fair +   Standing Balance (Static) Fair -   Standing Balance (Dynamic) Fair -   Coordination Upper Body   Fine Motor Coordination impaired   IRF-SELVIN:  Eating   Assistance Needed physical assistance   [place] Physical Assistance Level 75% or more   CARE Score 2   Discharge Goal:  Assistance Needed Set-up / clean-up   Discharge Goal:  Physical Assistance Level No physical assistance or only touching/steadying assist   Discharge Goal:  Score 5   IRF-SELVIN:  Oral Hygiene   Assistance Needed Verbal cue;Set-up / clean-up;Incidental touching   Physical Assistance Level 25%-49%   CARE Score 3   Discharge Goal:  Assistance Needed Independent   Discharge Goal:  Physical Assistance Level No physical assistance or only touching/steadying assist   Discharge Goal:  Score 6   IRF-SELVIN:  Shower/Bathe Self   Assistance Needed Physical assistance   Physical Assistance Level Less than 25%   CARE Score 3   Discharge " Goal:  Assistance Needed Independent;Adaptive equipment   Discharge Goal:  Physical Assistance Level No physical assistance or only touching/steadying assist   Discharge Goal Score 6   IRF-SLEVIN:  Upper Body Dressing   Assistance Needed Supervision   Physical Assistance Level No physical assistance or only touching/steadying assist   CARE Score 4   Discharge Goal:  Assistance Needed Independent   Discharge Goal:  Physical Assistance Level No physical assistance or only touching/steadying assist   Dischage Goal:  Score 6   IRF-SELVIN:  Lower Body Dressing   Assistance Needed Physical assistance   Physical Assistance Level 25%-49%   CARE Score 3   Discharge Goal:  Assistance Needed Independent   Discharge Goal:  Physical Assistance Level No physical assistance or only touching/steadying assist   Discharge Goal:  Score 6   IRF SELVIN:  Putting On/Taking Off Footwear   Assistance Needed Physical assistance   Physical Assistance Level 50%-74%   CARE Score 2   Discharge Goal:  Assistance Needed Independent   Discharge Goal:  Physical Assistance Level No physical assistance or only touching/steadying assist   Discharge Goal:  Score 6   IRF-SELVIN:  Toileting Hygiene   Assistance Needed Verbal cues;Incidental touching   Physical Assistance Level No physical assistance or only touching/steadying assist   CARE Score 4   Discharge Goal:  Assistance Needed Independent   Discharge Goal:  Physical Assistance Level No physical assistance or only touching/steadying assist   Discharge Goal:  Score 6   IRF-SELVIN:  Toilet Transfer   Assistance Needed Incidental touching;Verbal cues   Physical Assistance Level No physical assistance or only touching/steadying assist   CARE Score 4   Discharge Goal:  Assistance Needed Independent   Discharge Goal:  Physical Assistance Level No physical assistance or only touching/steadying assist   Discahrge Goal:  Score 6   IRF-SELVIN:  Hearing, Speech, and Vision   Expression of Ideas and Wants 3   Understanding  Verbal and Non-Verbal Content 3   Problem List   Problem List Decreased Active Daily Living Skills;Decreased Homemaking Skills;Decreased Upper Extremity Strength Right;Decreased Upper Extremity Strength Left;Decreased Activity Tolerance;Decreased Functional Mobility;Safety Awareness Deficits / Cognition;Impaired Posture / Trunk Alignment;Impaired Cognitive Function;Impaired Vision;Impaired Postural Control / Balance   Precautions   Precautions Fall Risk   Current Discharge Plan   Current Discharge Plan Return to Prior Living Situation   Benefit    Therapy Benefit Patient Would Benefit from Inpatient Rehab Occupational Therapy to Maximize [place] with ADLs, IADLs and Functional Mobility.   Interdisciplinary Plan of Care Collaboration   IDT Collaboration with  Nursing;Therapy Tech;Physical Therapist   Patient Position at End of Therapy Seated;Self Releasing Lap Belt Applied;Other (Comments)  (T-Dine for breakfast)   Collaboration Comments re: CLOF; per nursing, pt required max A for feeding due to vision. Therapy tech able to help pt for breakfast, however, may be able to do with supervision/set up if food is placed on his right.   Equipment Needs   Assistive Device / DME Front-Wheel Walker;Tub Transfer Bench;Grab Bars In Shower / Tub;Grab Bars By Toilet   Adaptive Equipment None   OT Total Time Spent   OT Individual Total Time Spent (Mins) 60   OT Charge Group   OT Evaluation OT Evaluation Mod       FIM Eating Score:  2 - Max Assistance  Eating Description:  (Per Nursing, pt needed to be fed last night due to vision)    FIM Grooming Score:  5 - Standby Prompting/Supervision or Set-up  Grooming Description:  Increased time, Supervision for safety(brushing his hair)    FIM Bathing Score:  4 - Minimal Assistance  Bathing Description:  Grab bar, Tub bench, Hand held shower(Pt washed all areas with verbal cueing and CGA when standing; Assist for rinsing back, buttocks and concepcion area;)    FIM Upper Body Dressin -  Standby Prompting/Supervision or Set-up  Upper Body Dressing Description:  Increased time, Supervision for safety, Set-up of equipment(encouragement)    FIM Lower Body Dressing Score:  3 - Moderate Assistance  Lower Body Dressing Description:  Increased time, Supervision for safety, Verbal cueing, Set-up of equipment(Dulce Maria for weaving L leg through pants; assist for socks; verbal encouragement and cueing for brief)    FIM Toileting Body Dressin - Minimal Assistance  Toileting Description:  Grab bar, Increased time, Supervision for safety, Verbal cueing(CGA)    FIM Bed/Chair/Wheelchair Transfers Score: 3 - Moderate Assistance  Bed/Chair/Wheelchair Transfers Description:  Increased time, Verbal cueing, Set-up of equipment, Supervision for safety(Mod A from supine to sit; CGA for stand pivot transfer to w/c)    FIM Toilet Transfer Score:  4 - Minimal Assistance  Toilet Transfer Description:  Increased time, Grab bar, Supervision for safety, Verbal cueing, Set-up of equipment(CGA; Verbal Cues)    FIM Tub/Shower Transfers Score:  4 - Minimal Assistance  Tub/Shower Transfers Description:  Grab bar, Increased time, Supervision for safety, Verbal cueing, Set-up of equipment(CGA; w/c to shower bench)      Assessment  Patient is 84 y.o. 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 CVA impacting right temporal and parietal lobes as well as a small area of the right thalamus. Pt with L hemianopsia (See PT note for more visual information.)  Additional factors influencing patient status / progress (ie: cognitive factors, co-morbidities, social support, etc): h/o immune thrombocytopenia, TIAs, carotid artery stenosis, hypertension, cataracts. Pt presents with limitations to vision, endurance, balance, and cognition impacting safety and independence with I/ADLs.     Plan  Recommend Occupational Therapy  minutes per day 5-7 days per week for 14-21 days for the following treatments:  OT  Orthotics Training, OT E Stim Attended, OT Group Therapy, OT Self Care/ADL, OT Cognitive Skill Dev, OT Community Reintegration, OT Manual Ther Technique, OT Neuro Re-Ed/Balance, OT Sensory Int Techniques, OT Therapeutic Activity, OT Ultrasound, OT Evaluation and OT Therapeutic Exercise.    Goals:  Long term and short term goals have been discussed with patient and they are in agreement.    Occupational Therapy Goals     Problem: Dressing     Dates: Start: 02/29/20       Description:     Goal: STG-Within one week, patient will dress LB     Dates: Start: 02/29/20   Expected End: 03/07/20       Description: 1) Individualized Goal:  With supervision and verbal cues  2) Interventions:  OT Orthotics Training, OT E Stim Attended, OT Group Therapy, OT Self Care/ADL, OT Cognitive Skill Dev, OT Community Reintegration, OT Manual Ther Technique, OT Neuro Re-Ed/Balance, OT Sensory Int Techniques, OT Therapeutic Activity, OT Ultrasound, OT Evaluation and OT Therapeutic Exercise                 Problem: Eating     Dates: Start: 02/29/20       Description:     Goal: STG-Within one week, patient will feed self     Dates: Start: 02/29/20   Expected End: 03/07/20       Description: 1) Individualized Goal:  With min A  2) Interventions:  OT Orthotics Training, OT E Stim Attended, OT Group Therapy, OT Self Care/ADL, OT Cognitive Skill Dev, OT Community Reintegration, OT Manual Ther Technique, OT Neuro Re-Ed/Balance, OT Sensory Int Techniques, OT Therapeutic Activity, OT Ultrasound, OT Evaluation and OT Therapeutic Exercise                   Problem: Functional Transfers     Dates: Start: 02/29/20       Description:     Goal: STG-Within one week, patient will transfer to toilet     Dates: Start: 02/29/20   Expected End: 03/07/20       Description: 1) Individualized Goal:  With supervision and verbal cues  2) Interventions:  OT Orthotics Training, OT E Stim Attended, OT Group Therapy, OT Self Care/ADL, OT Cognitive Skill Dev, OT  Community Reintegration, OT Manual Ther Technique, OT Neuro Re-Ed/Balance, OT Sensory Int Techniques, OT Therapeutic Activity, OT Ultrasound, OT Evaluation and OT Therapeutic Exercise                 Problem: OT Long Term Goals     Dates: Start: 02/29/20       Description:     Goal: LTG-By discharge, patient will complete basic self care tasks     Dates: Start: 02/29/20   Expected End: 03/21/20       Description: 1) Individualized Goal:  With mod I  2) Interventions:  OT Orthotics Training, OT E Stim Attended, OT Group Therapy, OT Self Care/ADL, OT Cognitive Skill Dev, OT Community Reintegration, OT Manual Ther Technique, OT Neuro Re-Ed/Balance, OT Sensory Int Techniques, OT Therapeutic Activity, OT Ultrasound, OT Evaluation and OT Therapeutic Exercise           Goal: LTG-By discharge, patient will complete basic home management     Dates: Start: 02/29/20   Expected End: 03/21/20       Description: 1) Individualized Goal:  With min A  2) Interventions:  OT Orthotics Training, OT E Stim Attended, OT Group Therapy, OT Self Care/ADL, OT Cognitive Skill Dev, OT Community Reintegration, OT Manual Ther Technique, OT Neuro Re-Ed/Balance, OT Sensory Int Techniques, OT Therapeutic Activity, OT Ultrasound, OT Evaluation and OT Therapeutic Exercise

## 2020-02-29 NOTE — THERAPY
Speech Language Pathology   Initial Assessment     Patient Name: Chilo Cabrera  AGE:  84 y.o., SEX:  male  Medical Record #: [id number]  Today's Date: 2/29/2020     Subjective    Pt w/ R CVA pleasant and cooperative for cognitive linguistic evaluation. Wife and brother present and supportive for education at end of session.      Objective       02/29/20 1331   Prior Living Situation   Housing / Facility 1 Story House   Lives with - Patient's Self Care Capacity Spouse   Prior Level Of Function   Communication Within Functional Limits   Swallow Within Functional Limits   Dentition Edentulous   Dentures Lowers;Uppers   Hearing Within Functional Limits for Evaluation   Hearing Aid None   Vision Wears Corrective Lenses;Reading ;Distance   Patient's Primary Language English   Education High School Graduate or GED   Occupation (Pre-Hospital Vocational) Retired Due To Age   Receptive Language / Auditory Comprehension   Receptive Language / Auditory Comprehension X   Identifies Pictures Minimal (4)   Right / Left Discrimination  Supervision (5)   Answers Yes / No Personal Questions Supervision (5)   Answers Yes / No Simple / Contextual Questions Minimal (4)   Follows One Unit Commands Supervision (5)   Follows Two Unit Commands Moderate (3)   Understands Simple, Structured Conversation  Supervision (5)   Expressive Language   Expressive Language (WDL) X   Naming Supervision (5)   Explains Functions Of Objects Supervision (5)   Repeats Speech Accurately Within Functional Limits (6-7)   Automatic Language Appropriate Supervision (5)   Verbalizes Wants / Needs Supervision (5)   Sustain Dialogue Within Given Topic Supervision (5)   Word Finding Deficits Supervision (5)   Cognition   Cognitive-Linguistic (WDL) X   Attention to Task Minimal (4)   Simple Attention Supervision (5)   Orientation  Severe (2)   Visual Scanning / Cancellation Skills Severe (2)   Simple Information Processing Supervision (5)   Topic Maintenance   "Supervision (5)   Verbal Short Term Memory 5 Minutes   Simple Reasoning / Problem Solving Moderate (3)   Insight into Deficits Moderate (3)   Clock Drawing Left Neglect;Impaired Hand Placement;Disorganization   IRF-SELVIN:  Cognitive Pattern Assessment   Cognitive Pattern Assessment Used BIMS   IRF-SELVIN:  Brief Interview for Mental Status (BIMS)   Repetition of Three Words (First Attempt) 3   Temporal Orientation: Able to Report the Correct Year Missed by > 5 years   Temporal Orientation: Able to Report the Correct Month Missed by > 1 month   Temporal Orientation: Able to Report the Correct Day of the Week Incorrect   Able to Recall \"Sock\" No, could not recall   Able to Recall \"Blue\" No, could not recall   Able to Recall \"Bed\" No, could not recall   BIMS Summary Score 3   Social / Pragmatic Communication   Social / Pragmatic Communication X   Appropriate Language Supervision (5)   Tracheostomy   Tracheostomy No   Speech Mechanisms / Voice Production   Speech Mechanisms / Voice Production (WDL) X   Voice Quality Hoarse;Gurgly;Breathy   Facial Weakness Right Minimal (4)   Speaks Fluently Supervision (5)   Loudness: Variable   Outcome Measures   Outcome Measures Utilized SCCAN   SCCAN (Scales of Cognitive and Communicative Ability for Neurorehabilitation)   Oral Expression - Raw Score 15   Oral Expression - Scale Performance Score 79   Orientation - Raw Score 6   Orientation - Scale Performance Score 50   Memory - Raw Score 3   Memory - Scale Performance Score 16   Speech Comprehension - Raw Score 7   Speech Comprehension - Scale Performance Score 54   Problem List   Problem List Cognitive-Linguistic Deficits;Visual Perception Deficit;Memory Deficit   Current Discharge Plan   Current Discharge Plan Return to Prior Living Situation   Benefit   Therapy Benefit Patient would benefit from Inpatient Rehab Speech-Language Pathology to address above identified deficits.   Interdisciplinary Plan of Care Collaboration   IDT " Collaboration with  Physician;Family / Caregiver   Patient Position at End of Therapy Seated;Self Releasing Lap Belt Applied;Family / Friend in Room   Collaboration Comments ERNESTO Nielsen   Speech Language Pathologist Assigned   Assigned SLP / Pager # [phone]   SLP Total Time Spent   SLP Individual Total Time Spent (Mins) 60   SLP Charge Group   Charges Yes   SLP Speech Language Evaluation Speech Sound Language Comprehension       FIM Comprehension Score:  5 - Stand-by Prompting/Supervision or Set-up  Comprehension Description:  Glasses, Verbal cues    FIM Expression Score:  5 - Stand-by Prompting/Supervision or Set-up  Expression Description:  Verbal cueing    FIM Social Interaction Score:  5 - Stand-by Prompting/Supervision or Set-up  Social Interaction Description:  Increased time, Verbal cues    FIM Problem Solving Score:  4 - Minimal Assistance  Problem Solving Description:  Verbal cueing, Bed/chair alarm, Increased time, Therapy schedule    FIM Memory Score:  2 - Max Assistance  Memory Description:  Verbal cueing, Bed/chair alarm, Seat belt, Therapy schedule    Assessment    Patient is 84 y.o. male with a diagnosis of right CVA affecting R temporal and parietal lobes.  Additional factors influencing patient status/progress (ie: cognitive factors, co-morbidities, social support, etc): left visual neglect, memory, and problem solving deficits. Scales of Cognitive and Communicative Ability for Neurorehabilitation initiated. Pt presenting significant deficits in orientation, memory, and speech comprehension. Pt understands basic conversation with SPV cues. Pt presenting with significant left field neglect. REC: Speech therapy to address memory, scanning, and problem solving.      Plan  Recommend Speech Therapy  minutes per day 5-7 days per week for 2 weeks for the following treatments:  SLP Self Care / ADL Training , SLP Cognitive Skill Development and SLP Group Treatment.    Goals:  Long term and short term  goals have been discussed with patient and spouse and they are in agreement.    Speech Therapy Problems     Problem: Memory STGs     Dates: Start: 02/29/20       Description:     Goal: STG-Within one week, patient will     Dates: Start: 02/29/20       Description: 1) Individualized goal:  Utilize external memory aids for orientation to date and daily therapy activities with 80% acc and MOD A.   2) Interventions:  SLP Self Care / ADL Training , SLP Cognitive Skill Development and SLP Group Treatment                   Problem: Problem Solving STGs     Dates: Start: 02/29/20       Description:     Goal: STG-Within one week, patient will     Dates: Start: 02/29/20       Description: 1) Individualized goal:  Complete the Scales of Cognitive and Communicative Ability for Neurorehabilitation with goals developed as appropriate.   2) Interventions:  SLP Cognitive Skill Development             Goal: STG-Within one week, patient will     Dates: Start: 02/29/20       Description: 1) Individualized goal:  Complete basic single target scanning activities with 80% accuracy and MIN A.   2) Interventions:  SLP Self Care / ADL Training , SLP Cognitive Skill Development and SLP Group Treatment                   Problem: Speech/Swallowing LTGs     Dates: Start: 02/29/20       Description:     Goal: LTG-By discharge, patient will     Dates: Start: 02/29/20       Description: 1) Individualized goal:  Solve basic to moderate problems related to health and safety using external memory aids when needed with 80% accuracy and MIN A.   2) Interventions:  SLP Self Care / ADL Training  and SLP Cognitive Skill Development, Group Treatment

## 2020-02-29 NOTE — PROGRESS NOTES
Received patient during shift change, report rec'd from day shift RN. Resting in bed, VS stable on room air. Per report incontinent of B&B, min assist for transfers. A&O x 2, able to make needs known. Bed in low position, call light within reach.

## 2020-02-29 NOTE — CARE PLAN
Problem: Safety  Goal: Will remain free from injury    Goal: Will remain free from falls  No sense of safety awareness. He does not call for assist despite numerous cues. Alarms are in place and he is close to the nurses station,

## 2020-02-29 NOTE — FLOWSHEET NOTE
02/29/20 1509   Events/Summary/Plan   Events/Summary/Plan Assessment   Vital Signs   Pulse (!) 110   Respiration 16   Pulse Oximetry 95 %   $ Pulse Oximetry (Spot Check) Yes   Respiratory Assessment   Level of Consciousness Alert   Breath Sounds   RUL Breath Sounds Clear   RML Breath Sounds Clear   RLL Breath Sounds Clear   THAI Breath Sounds Clear   LLL Breath Sounds Clear   Secretions   Cough Strong;Non Productive   Oxygen   O2 Delivery Device None - Room Air   Smoking History   Have you ever smoked Yes   Have you smoked in the last 12 months No   Have you quit smoking Yes   Confirm Quit Date 02/28/83   Smoking Pack Years 26   Smoking Cessation Offered Patient Counseled   COPD Risk Screening   Do you have a history of COPD? No   OTHER   During the past 4 weeks, how much did you feel short of breath? 0   COPD Population Screener   Do you ever cough up any mucus or phlegm? 0   In the past 12 months, you do less than you used to because of your breathing problems 0   Have you smoked at least 100 cigarettes in your entire life? 2   How old are you? 3   COPD Screening Score 4   COPD Coordinator Not Recommended Yes

## 2020-02-29 NOTE — CARE PLAN
Problem: Safety  Goal: Will remain free from injury  Outcome: PROGRESSING AS EXPECTED  Goal: Will remain free from falls  Outcome: PROGRESSING AS EXPECTED  Note: Needs cues to call for assist. He did attempt to get out of bed when he needed to void. Educated on the importance of calling for all transfers to prevent falls. Alarms on bed and chair.

## 2020-02-29 NOTE — PROGRESS NOTES
"Rehab Progress Note     Encounter Date: 2/29/2020    CC: Right CVA    Interval Events (Subjective)  Repeatedly asking when he can go home.  Wife and brother are present at bedside  He denies any pain  He denies any blurry vision  He slept well last night      ROS:  Gen: No fatigue, confusion, significant weight loss  Eyes: no blurry vision, double vision or pain in eyes  ENT: no changes in hearing, runny nose, nose bleeds, sinus pain  CV: No CP, palpitations,  Lungs: no shortness of breath, changes in secretions, changes in cough, pain with coughing  Abd: No abd pain, nausea, vomiting, pain with eating  : no blood in urine, suprapubic pain  Ext: No swelling in legs, asymmetric atrophy  Neuro: no changes in strength or sensation  Skin: no new ulcers/skin breakdown appreciated by patient  Mood: No changes in mood today, increase in depression or anxiety  Heme: no bruising, or bleeding  Rest of 14 point review of systems is negative    Objective:  Vitals: /66   Pulse 90   Temp 36.5 °C (97.7 °F) (Oral)   Resp 18   Ht 1.951 m (6' 4.8\")   Wt 75 kg (165 lb 5.5 oz)   SpO2 96%   Gen: NAD, Body mass index is 19.71 kg/m².  HEENT: NC/AT, PERRLA, moist mucous membranes, not tracking past midline on the left  Cardio: RRR, no mumurs  Pulm: CTAB, with normal respiratory effort  Abd: Soft NTND, active bowel sounds  Ext: No peripheral edema. No calf tenderness. No clubbing/cyanosis. +dorsal pedalis pulses bilaterally.  Neuro: Left-sided neglect    Recent Results (from the past 72 hour(s))   CBC with Differential    Collection Time: 02/29/20  5:53 AM   Result Value Ref Range    WBC 5.0 4.8 - 10.8 K/uL    RBC 4.29 (L) 4.70 - 6.10 M/uL    Hemoglobin 13.8 (L) 14.0 - 18.0 g/dL    Hematocrit 39.6 (L) 42.0 - 52.0 %    MCV 92.3 81.4 - 97.8 fL    MCH 32.2 27.0 - 33.0 pg    MCHC 34.8 33.7 - 35.3 g/dL    RDW 49.2 35.9 - 50.0 fL    Platelet Count 154 (L) 164 - 446 K/uL    MPV 12.2 9.0 - 12.9 fL    Neutrophils-Polys 70.50 44.00 - " 72.00 %    Lymphocytes 17.40 (L) 22.00 - 41.00 %    Monocytes 7.10 0.00 - 13.40 %    Eosinophils 3.60 0.00 - 6.90 %    Basophils 1.00 0.00 - 1.80 %    Immature Granulocytes 0.40 0.00 - 0.90 %    Nucleated RBC 0.00 /100 WBC    Neutrophils (Absolute) 3.49 1.82 - 7.42 K/uL    Lymphs (Absolute) 0.86 (L) 1.00 - 4.80 K/uL    Monos (Absolute) 0.35 0.00 - 0.85 K/uL    Eos (Absolute) 0.18 0.00 - 0.51 K/uL    Baso (Absolute) 0.05 0.00 - 0.12 K/uL    Immature Granulocytes (abs) 0.02 0.00 - 0.11 K/uL    NRBC (Absolute) 0.00 K/uL   Comp Metabolic Panel (CMP)    Collection Time: 02/29/20  5:53 AM   Result Value Ref Range    Sodium 138 135 - 145 mmol/L    Potassium 3.2 (L) 3.6 - 5.5 mmol/L    Chloride 105 96 - 112 mmol/L    Co2 26 20 - 33 mmol/L    Anion Gap 7.0 0.0 - 11.9    Glucose 93 65 - 99 mg/dL    Bun 13 8 - 22 mg/dL    Creatinine 0.88 0.50 - 1.40 mg/dL    Calcium 8.9 8.5 - 10.5 mg/dL    AST(SGOT) 31 12 - 45 U/L    ALT(SGPT) 15 2 - 50 U/L    Alkaline Phosphatase 69 30 - 99 U/L    Total Bilirubin 1.6 (H) 0.1 - 1.5 mg/dL    Albumin 3.7 3.2 - 4.9 g/dL    Total Protein 6.0 6.0 - 8.2 g/dL    Globulin 2.3 1.9 - 3.5 g/dL    A-G Ratio 1.6 g/dL   Magnesium    Collection Time: 02/29/20  5:53 AM   Result Value Ref Range    Magnesium 1.7 1.5 - 2.5 mg/dL   Vitamin D, 25-hydroxy (blood)    Collection Time: 02/29/20  5:53 AM   Result Value Ref Range    25-Hydroxy   Vitamin D 25 15 (L) 30 - 100 ng/mL   proBrain Natriuretic Peptide, NT    Collection Time: 02/29/20  5:53 AM   Result Value Ref Range    NT-proBNP 230 (H) 0 - 125 pg/mL   HEMOGLOBIN A1C    Collection Time: 02/29/20  5:53 AM   Result Value Ref Range    Glycohemoglobin 5.5 0.0 - 5.6 %    Est Avg Glucose 111 mg/dL   ESTIMATED GFR    Collection Time: 02/29/20  5:53 AM   Result Value Ref Range    GFR If African American >60 >60 mL/min/1.73 m 2    GFR If Non African American >60 >60 mL/min/1.73 m 2       Current Facility-Administered Medications   Medication Frequency   • Respiratory  Therapy Consult Continuous RT   • Pharmacy Consult Request ...Pain Management Review 1 Each PHARMACY TO DOSE   • acetaminophen (TYLENOL) tablet 650 mg Q4HRS PRN   • hydrALAZINE (APRESOLINE) tablet 25 mg Q8HRS PRN   • senna-docusate (PERICOLACE or SENOKOT S) 8.6-50 MG per tablet 2 Tab BID    And   • polyethylene glycol/lytes (MIRALAX) PACKET 1 Packet QDAY PRN    And   • magnesium hydroxide (MILK OF MAGNESIA) suspension 30 mL QDAY PRN    And   • bisacodyl (DULCOLAX) suppository 10 mg QDAY PRN   • artificial tears ophthalmic solution 1 Drop PRN   • benzocaine-menthol (CEPACOL) lozenge 1 Lozenge Q2HRS PRN   • mag hydrox-al hydrox-simeth (MAALOX PLUS ES or MYLANTA DS) suspension 20 mL Q2HRS PRN   • ondansetron (ZOFRAN ODT) dispertab 4 mg 4X/DAY PRN    Or   • ondansetron (ZOFRAN) syringe/vial injection 4 mg 4X/DAY PRN   • traZODone (DESYREL) tablet 50 mg QHS PRN   • sodium chloride (OCEAN) 0.65 % nasal spray 2 Spray PRN   • hydrOXYzine HCl (ATARAX) tablet 50 mg Q6HRS PRN   • melatonin tablet 3 mg HS PRN   • enoxaparin (LOVENOX) inj 40 mg QHS   • lactulose 20 GM/30ML solution 30 mL QDAY PRN   • docusate sodium (ENEMEEZ) enema 283 mg QDAY PRN   • Eltrombopag Olamine TABS 50 mg DAILY   • allopurinol (ZYLOPRIM) tablet 300 mg DAILY   • aspirin EC (ECOTRIN) tablet 325 mg DAILY       Orders Placed This Encounter   Procedures   • Diet Order Cardiac     Standing Status:   Standing     Number of Occurrences:   1     Order Specific Question:   Diet:     Answer:   Cardiac [6]       Assessment:  Active Hospital Problems    Diagnosis   • *Cerebrovascular accident (CVA) due to thrombosis of right posterior cerebral artery (HCC)   • Essential hypertension   • History of ITP   • History of gout       Medical Decision Making and Plan:    CVA: Right PCA stroke, affecting right temporal parietal lobes with small area of right thalamus.  NIH scale of 10 on admission.  Left-sided neglect, question left-sided field cut  -Aspirin 325 mg for  secondary prophylaxis  -Continue comprehensive acute inpatient rehabilitation    Idiopathic thrombocytopenia: Platelets on admission of 154  - Eltrombopag Olamine 50mg dailiy, patient is applying his own medications    CHF: Grade 1 diastolic dysfunction,    -Consult hospitalist  -Continue aspirin    Hypokalemia:  -Supplement with potassium chloride 40 mEq x 1  -Check BMP in a.m.  -Consult hospitalist    Vitamin D deficiency: Vitamin D level of 15 on admission  -Cholecalciferol 2000 units daily    Total time:  28 minutes.  I spent greater than 50% of the time for patient care and coordination on this date, including unit/floor time, and face-to-face time with the patient as per assessment and plan above including right CVA, hypokalemia, supplementation, check BMP in a.m., consult hospitalist, vitamin D deficiency, cholecalciferol for mentation.    Shaun Burgos D.O.

## 2020-02-29 NOTE — FLOWSHEET NOTE
02/29/20 1500   Patient History   Pulmonary Diagnosis None   Procedures Relevant to Respiratory Status None   Home O2 No   Nocturnal CPAP No   Home Treatments/Frequency No   Protocol Pathways   Protocol Pathways None

## 2020-03-01 LAB
ANION GAP SERPL CALC-SCNC: 8 MMOL/L (ref 0–11.9)
BUN SERPL-MCNC: 13 MG/DL (ref 8–22)
CALCIUM SERPL-MCNC: 9 MG/DL (ref 8.5–10.5)
CHLORIDE SERPL-SCNC: 105 MMOL/L (ref 96–112)
CHOLEST SERPL-MCNC: 75 MG/DL (ref 100–199)
CO2 SERPL-SCNC: 26 MMOL/L (ref 20–33)
CREAT SERPL-MCNC: 0.85 MG/DL (ref 0.5–1.4)
GLUCOSE SERPL-MCNC: 106 MG/DL (ref 65–99)
HDLC SERPL-MCNC: 17 MG/DL
LDLC SERPL CALC-MCNC: 32 MG/DL
POTASSIUM SERPL-SCNC: 3.6 MMOL/L (ref 3.6–5.5)
SODIUM SERPL-SCNC: 139 MMOL/L (ref 135–145)
TRIGL SERPL-MCNC: 129 MG/DL (ref 0–149)

## 2020-03-01 PROCEDURE — A9270 NON-COVERED ITEM OR SERVICE: HCPCS

## 2020-03-01 PROCEDURE — A9270 NON-COVERED ITEM OR SERVICE: HCPCS | Performed by: PHYSICAL MEDICINE & REHABILITATION

## 2020-03-01 PROCEDURE — 700102 HCHG RX REV CODE 250 W/ 637 OVERRIDE(OP)

## 2020-03-01 PROCEDURE — 80048 BASIC METABOLIC PNL TOTAL CA: CPT

## 2020-03-01 PROCEDURE — 97530 THERAPEUTIC ACTIVITIES: CPT

## 2020-03-01 PROCEDURE — 93010 ELECTROCARDIOGRAM REPORT: CPT | Performed by: INTERNAL MEDICINE

## 2020-03-01 PROCEDURE — 97110 THERAPEUTIC EXERCISES: CPT

## 2020-03-01 PROCEDURE — 80061 LIPID PANEL: CPT

## 2020-03-01 PROCEDURE — 700102 HCHG RX REV CODE 250 W/ 637 OVERRIDE(OP): Performed by: PHYSICAL MEDICINE & REHABILITATION

## 2020-03-01 PROCEDURE — 36415 COLL VENOUS BLD VENIPUNCTURE: CPT

## 2020-03-01 PROCEDURE — 93005 ELECTROCARDIOGRAM TRACING: CPT | Performed by: PHYSICAL MEDICINE & REHABILITATION

## 2020-03-01 PROCEDURE — 700111 HCHG RX REV CODE 636 W/ 250 OVERRIDE (IP): Performed by: PHYSICAL MEDICINE & REHABILITATION

## 2020-03-01 PROCEDURE — 770010 HCHG ROOM/CARE - REHAB SEMI PRIVAT*

## 2020-03-01 PROCEDURE — 97535 SELF CARE MNGMENT TRAINING: CPT

## 2020-03-01 RX ORDER — CARVEDILOL 3.12 MG/1
6.25 TABLET ORAL ONCE
Status: COMPLETED | OUTPATIENT
Start: 2020-03-01 | End: 2020-03-01

## 2020-03-01 RX ORDER — CARVEDILOL 3.12 MG/1
TABLET ORAL
Status: COMPLETED
Start: 2020-03-01 | End: 2020-03-01

## 2020-03-01 RX ADMIN — SENNOSIDES AND DOCUSATE SODIUM 2 TABLET: 8.6; 5 TABLET ORAL at 20:38

## 2020-03-01 RX ADMIN — MELATONIN 2000 UNITS: at 08:47

## 2020-03-01 RX ADMIN — ASPIRIN 325 MG: 325 TABLET, COATED ORAL at 08:47

## 2020-03-01 RX ADMIN — SENNOSIDES AND DOCUSATE SODIUM 2 TABLET: 8.6; 5 TABLET ORAL at 08:47

## 2020-03-01 RX ADMIN — ENOXAPARIN SODIUM 40 MG: 100 INJECTION SUBCUTANEOUS at 20:39

## 2020-03-01 RX ADMIN — ALLOPURINOL 300 MG: 300 TABLET ORAL at 08:47

## 2020-03-01 RX ADMIN — CARVEDILOL 6.25 MG: 3.12 TABLET, FILM COATED ORAL at 18:27

## 2020-03-01 RX ADMIN — TRAZODONE HYDROCHLORIDE 50 MG: 50 TABLET ORAL at 20:39

## 2020-03-01 RX ADMIN — CARVEDILOL 6.25 MG: 3.12 TABLET ORAL at 18:27

## 2020-03-01 ASSESSMENT — FIBROSIS 4 INDEX: FIB4 SCORE: 4.31

## 2020-03-01 NOTE — THERAPY
"Occupational Therapy  Daily Treatment     Patient Name: Chilo Cabrera  Age:  84 y.o., Sex:  male  Medical Record #: [id number]  Today's Date: 3/1/2020     Precautions  Precautions: Fall Risk  Comments: L hemianopsia, L hemiparesis         Subjective  \"Good morning\"     Objective       03/01/20 0701   Precautions   Precautions Fall Risk   Comments L hemianopsia, L hemiparesis   Vitals   O2 Delivery Device None - Room Air   Pain   Intervention Declines   Pain 0 - 10 Group   Therapist Pain Assessment 0   Cognition    Level of Consciousness Alert   Active ROM Upper Body   Dominant Hand Left   Comments additional time/delayed coordination LUE   Sitting Upper Body Exercises   Upper Extremity Bike Level 2 Resistance  (FluidoBike, 15 mins, rest break x 3, maintained L grasp)   Fine Motor / Dexterity    Comments  PVC pipe puzzle - pt reported being laft hand dominant - integrated affected LUE primarily as a gross assist and minimally as a functional assist.  Noted delayed coordination LUE.  [id number] piece pvc puzzle tasks w/ RUE as primary    Interdisciplinary Plan of Care Collaboration   IDT Collaboration with  Certified Nursing Assistant;Nursing   Patient Position at End of Therapy Seated;Self Releasing Lap Belt Applied   OT Total Time Spent   OT Individual Total Time Spent (Mins) 60   OT Charge Group   OT Self Care / ADL 2   OT Therapy Activity 1   OT Therapeutic Exercise  1       FIM Grooming Score:  5 - Standby Prompting/Supervision or Set-up  Grooming Description:  Increased time, Supervision for safety, Set-up of equipment, Initial preparation for task(L visual  field impairment, tactile/VQ's to attend to left.  wc level at sink side for groom/hygiene/denture care)    FIM Lower Body Dressing Score:  4 - Minimal Assistance  Lower Body Dressing Description:  Increased time, Supervision for safety, Verbal cueing, Set-up of equipment, Initial preparation for task(Primary limiter L side visual foield cut.  Integrated " affected LUE as a gross and functional assist.  CGA in stand to manage pants/briefs at waist via grab bar, Min assist to thread feet through pants/briefs, Min assist for socks)    FIM Toiletin - Minimal Assistance  Toileting Description:  Grab bar, Increased time, Supervision for safety, Verbal cueing, Set-up of equipment, Initial preparation for task(CGA  in standing for toileting task via grab bar.  L dominant, L hemiparesis - integrated LUE as rimarily a gross assist w/ minimal functional assist )    FIM Toilet Transfer Score:  4 - Minimal Assistance  Toilet Transfer Description:  Grab bar, Increased time, Supervision for safety, Verbal cueing, Set-up of equipment, Initial preparation for task(L visual field cut, CGA via grab bar to transfer to/from maual wc and commode)      Assessment    Pt was alert and cooperative w/ tx.  Pt reported being L hand dominant, completed tasks w/ R hand as primary functional assist.  Delayed coordinating LUE yet primarily integrated as a gross assist and minimally as a functional assist.  Noted impulsivity and required CGA for toileting + commode transfers.  Limiters include L hemianopsia w/ Supervision for ADL's.    Plan    Cont'd OT for neuro re-education, vision, balance, strength, endurance + education for safe ADL's and transfers.

## 2020-03-01 NOTE — PROGRESS NOTES
Received patient during shift change, report rec'd from day shift RN. Resting in bed, VS stable on room air. Incontinent of B&B at times. Min assist for transfers. A&O x 2, able to make needs known. Bed in low position, call light within reach.

## 2020-03-01 NOTE — CARE PLAN
Problem: Safety  Goal: Will remain free from injury  Outcome: PROGRESSING SLOWER THAN EXPECTED  Goal: Will remain free from falls  Outcome: PROGRESSING SLOWER THAN EXPECTED  No falls or injury but he does not have any safety awareness regarding calling for assist for any transfers despite numerous reminders. Javier rosario.

## 2020-03-01 NOTE — CARE PLAN
Problem: Communication  Goal: The ability to communicate needs accurately and effectively will improve  Outcome: PROGRESSING SLOWER THAN EXPECTED  Intervention: Reorient patient to environment as needed  Note: Pt confused, continuous questions r/t wife, missing objects. Reorienting pt to place, situation, time.     Problem: Safety  Goal: Will remain free from injury  Intervention: Provide assistance with mobility  Flowsheets (Taken 3/1/2020 0302)  Assistance: Assistance of One  Note: Pt impulsive, confused. Bed in low position, call light within reach, bed alarm activated.     Problem: Respiratory:  Goal: Respiratory status will improve  Outcome: PROGRESSING AS EXPECTED

## 2020-03-02 PROBLEM — E55.9 VITAMIN D DEFICIENCY: Status: ACTIVE | Noted: 2020-03-02

## 2020-03-02 PROBLEM — R41.89 COGNITIVE DEFICITS: Status: ACTIVE | Noted: 2020-03-02

## 2020-03-02 PROBLEM — H53.40 VISUAL FIELD CUT: Status: ACTIVE | Noted: 2020-03-02

## 2020-03-02 LAB — EKG IMPRESSION: NORMAL

## 2020-03-02 PROCEDURE — 97130 THER IVNTJ EA ADDL 15 MIN: CPT

## 2020-03-02 PROCEDURE — A9270 NON-COVERED ITEM OR SERVICE: HCPCS | Performed by: PHYSICAL MEDICINE & REHABILITATION

## 2020-03-02 PROCEDURE — 97535 SELF CARE MNGMENT TRAINING: CPT

## 2020-03-02 PROCEDURE — 97116 GAIT TRAINING THERAPY: CPT

## 2020-03-02 PROCEDURE — 97110 THERAPEUTIC EXERCISES: CPT

## 2020-03-02 PROCEDURE — 700111 HCHG RX REV CODE 636 W/ 250 OVERRIDE (IP): Performed by: PHYSICAL MEDICINE & REHABILITATION

## 2020-03-02 PROCEDURE — 97530 THERAPEUTIC ACTIVITIES: CPT

## 2020-03-02 PROCEDURE — 700102 HCHG RX REV CODE 250 W/ 637 OVERRIDE(OP): Performed by: PHYSICAL MEDICINE & REHABILITATION

## 2020-03-02 PROCEDURE — 99233 SBSQ HOSP IP/OBS HIGH 50: CPT | Performed by: PHYSICAL MEDICINE & REHABILITATION

## 2020-03-02 PROCEDURE — 97129 THER IVNTJ 1ST 15 MIN: CPT

## 2020-03-02 PROCEDURE — 770010 HCHG ROOM/CARE - REHAB SEMI PRIVAT*

## 2020-03-02 RX ORDER — BISACODYL 10 MG
10 SUPPOSITORY, RECTAL RECTAL
Status: DISCONTINUED | OUTPATIENT
Start: 2020-03-02 | End: 2020-03-04

## 2020-03-02 RX ORDER — POLYETHYLENE GLYCOL 3350 17 G/17G
1 POWDER, FOR SOLUTION ORAL DAILY
Status: DISCONTINUED | OUTPATIENT
Start: 2020-03-02 | End: 2020-03-04

## 2020-03-02 RX ORDER — AMOXICILLIN 250 MG
2 CAPSULE ORAL 2 TIMES DAILY
Status: DISCONTINUED | OUTPATIENT
Start: 2020-03-02 | End: 2020-03-04

## 2020-03-02 RX ADMIN — MELATONIN 2000 UNITS: at 09:27

## 2020-03-02 RX ADMIN — POLYETHYLENE GLYCOL 3350 1 PACKET: 17 POWDER, FOR SOLUTION ORAL at 13:00

## 2020-03-02 RX ADMIN — ALLOPURINOL 300 MG: 300 TABLET ORAL at 09:27

## 2020-03-02 RX ADMIN — ASPIRIN 325 MG: 325 TABLET, COATED ORAL at 09:27

## 2020-03-02 RX ADMIN — ENOXAPARIN SODIUM 40 MG: 100 INJECTION SUBCUTANEOUS at 20:58

## 2020-03-02 RX ADMIN — TRAZODONE HYDROCHLORIDE 50 MG: 50 TABLET ORAL at 20:58

## 2020-03-02 RX ADMIN — SENNOSIDES AND DOCUSATE SODIUM 2 TABLET: 8.6; 5 TABLET ORAL at 09:27

## 2020-03-02 RX ADMIN — ACETAMINOPHEN 650 MG: 325 TABLET, FILM COATED ORAL at 15:32

## 2020-03-02 RX ADMIN — SENNOSIDES AND DOCUSATE SODIUM 2 TABLET: 8.6; 5 TABLET ORAL at 20:57

## 2020-03-02 NOTE — CARE PLAN
Problem: Safety  Goal: Will remain free from injury  Outcome: PROGRESSING SLOWER THAN EXPECTED  Continues to need max cues for safety. He needs chair and bed alarms for safety and room close to nurses station. He is not able to remember how to call for assist even with cues.

## 2020-03-02 NOTE — THERAPY
Speech Language Pathology  Daily Treatment     Patient Name: Chilo Cabrera  Age:  84 y.o., Sex:  male  Medical Record #: [id number]  Today's Date: 3/2/2020     Subjective    Pt pleasant and cooperative, perseverative re: desire to go home.     Objective       03/02/20 0933   SCCAN (Scales of Cognitive and Communicative Ability for Neurorehabilitation)   Oral Expression - Raw Score 15   Oral Expression - Scale Performance Score 79   Orientation - Raw Score 6   Orientation - Scale Performance Score 50   Memory - Raw Score 3   Memory - Scale Performance Score 16   Speech Comprehension - Raw Score 7   Speech Comprehension - Scale Performance Score 54   Reading Comprehension - Raw Score 2   Reading Comprehension - Scale Performance Score 17   Writing - Raw Score 0   Writing - Scale Performance Score 0   Attention - Raw Score 3   Attention - Scale Performance Score 19   Problem Solving - Raw Score 9   Problem Solving - Scale Performance Score 39   SCCAN Total Raw Score 37   SCCAN Degree of Severity Severe Impairment   SLP Total Time Spent   SLP Individual Total Time Spent (Mins) 60   Charge Group   SLP Cognitive Skill Development First 15 Minutes 1   SLP Cognitive Skill Development Additional 15 Minutes 3       Assessment    SCCAN cont at this time with pt achieving a score of 37 which indicates SEVERE cognitive impairments. Pt with poor insight and awareness, unaware of any physical or cognitive deficits following stroke. Slow processing and additional time required to complete the given tasks. O-log administered with pt achieving a score of 12/30 on this date. Simple visual scanning tasks presented, pt required total assistance for completion at this time.     Plan    Simple attention,  Visual scanning and orientation

## 2020-03-02 NOTE — THERAPY
"Occupational Therapy  Daily Treatment     Patient Name: Chilo Cabrera  Age:  84 y.o., Sex:  male  Medical Record #: [id number]  Today's Date: 3/2/2020     Precautions  Precautions: (P) Fall Risk  Comments: (P) L hemianopsia, L hemiparesis         Subjective    \"I was in the [employer] for over twenty years. I'm accustomed to shaving every morning.\"      Objective       03/02/20 0701   Precautions   Precautions Fall Risk   Comments L hemianopsia, L hemiparesis   Cognition    Level of Consciousness Alert   Active ROM Upper Body   Comments Additional time and light cues to attend to bimanual pursuits   Sitting Lower Body Exercises   Sitting Lower Body Exercises Yes   Nustep Resistance Level 1  (20 minutes, Level 2, no RBs)   Balance   Standing Balance (Static) Fair -   Standing Balance (Dynamic) Fair -   Skilled Intervention Postural Facilitation;Compensatory Strategies;Tactile Cuing;Verbal Cuing   Comments CGA SPT w/c <> NuStep without use of FWW.  Fading cues for hand placement to initiate transfer.    Interdisciplinary Plan of Care Collaboration   IDT Collaboration with  Nursing   Patient Position at End of Therapy Seated;Self Releasing Lap Belt Applied   Collaboration Comments Patient escorted to dining room for breakfast   OT Total Time Spent   OT Individual Total Time Spent (Mins) 60   OT Charge Group   Charges Yes   OT Self Care / ADL 2   OT Therapeutic Exercise  2       FIM Eating Score:     Eating Description:       FIM Grooming Score:  4 - Minimal Assistance  Grooming Description:  Increased time, Supervision for safety, Verbal cueing(Max A for shaving task due to length of beard, SBA for face washing, hair care, oral care)    FIM Bathing Score:     Bathing Description:       FIM Upper Body Dressing:     Upper Body Dressing Description:       FIM Lower Body Dressing Score:  4 - Minimal Assistance  Lower Body Dressing Description:  Increased time, Requires minimal contact(Footwear only; slippers with velcro straps " with set-up assist from seated position)    FIM Toileting Body Dressing:     Toileting Description:       FIM Bed/Chair/Wheelchair Transfers Score:    Bed/Chair/Wheelchair Transfers Description:       FIM Toilet Transfer Score:     Toilet Transfer Description:       FIM Tub/Shower Transfers Score:     Tub/Shower Transfers Description:         Assessment    Patient dressed, up in chair, and willing to work with therapy upon therapist arrival. Steady assist required for stand pivot transfers without use of AE with incidental tactile cues for hand/foot placement. Seated grooming at sink-side completed at Min A secondary to patient attempting shaving task (requiring Max A secondary to length of beard) and set-up for oral care and hair care with fair bimanual integration with tasks.  Tray set up in cafeteria positioned on right side with patient set-up assist with self-feeding tasks.     Plan     Cont' OT for neuro re-ed, vision, L fine motor coordination and stabilization retraining,endurance building, standing balance, and bimanual fine motor and gross motor coordination

## 2020-03-02 NOTE — THERAPY
Physical Therapy   Daily Treatment     Patient Name: Chilo Cabrera  Age:  84 y.o., Sex:  male  Medical Record #: [id number]  Today's Date: 3/2/2020     Precautions  Precautions: Fall Risk  Comments: L hemianopsia, L hemiparesis    Subjective    Pt reports he is agreeable to walking and exercise      Objective       03/02/20 1031   Supine Lower Body Exercise   Supine Lower Body Exercises Yes   Bridges 1 set of 15;Two Legged   Hip Abduction 1 set of 15;Bilateral   Hip Adduction  1 set of 15;Bilateral   Straight Leg Raises 1 set of 15;Bilateral   Short Arc Quad 1 set of 15;Bilateral   Heel Slide 1 set of 15;Bilateral   Ankle Pumps 1 set of 15;Bilateral   Other Exercises VCs adn demo for form adn technique, issued HEP for performign in between therapies in room   Bed Mobility    Supine to Sit Supervised   Sit to Supine Supervised   Sit to Stand Contact Guard Assist   Scooting Supervised   Rolling Supervised   Interdisciplinary Plan of Care Collaboration   Patient Position at End of Therapy Seated;Chair Alarm On;Self Releasing Lap Belt Applied  (cafeteria)   PT Total Time Spent   PT Individual Total Time Spent (Mins) 60   PT Charge Group   PT Gait Training 1   PT Therapeutic Exercise 1   PT Therapeutic Activities 2       FIM Bed/Chair/Wheelchair Transfers Score: 4 - Minimal Assistance  Bed/Chair/Wheelchair Transfers Description:  Increased time, Verbal cueing, Adaptive equipment    FIM Walking Score:  1 - Total Assistance  Walking Description:  Verbal cueing, Requires incidental assist, Extra time, Safety concerns, Requires wheelchair follow(Attempted walking no AD,  follow for safety, 40 ft, min A throughout, occasional LOB requiring assist to prevent fall)      Assessment    Pt performs exercises well with cues and demo. Demos need for assist to maintain balance when walking without AD. Pt demos slight improvement with transfers this session with decreased level of assist     Plan    Walk FWW, 10MWT, Vanessa, trial  estim, Vector no AD, lite gait for increased speed, dynamic balance challenges, navigating tight spaces/home environment with FWW, L attention and LLE forced use, family training as able,

## 2020-03-02 NOTE — FLOWSHEET NOTE
03/01/20 1800   Events/Summary/Plan   Events/Summary/Plan EKG was ordered, completed turned into Dr Burgos and notified RN

## 2020-03-02 NOTE — PROGRESS NOTES
"Rehab Progress Note     Date of Service: 3/2/2020  Chief Complaint: follow up stroke    Interval Events (Subjective)    Patient seen and examined in the therapy gym today.  He is aware of why he is in the hospital and that he had a stroke.  He denies any pain.  He reports he is sleeping okay.  He is mildly constipated.  He has no other complaints today.    Objective:  VITAL SIGNS: /71   Pulse 75   Temp 36.4 °C (97.6 °F) (Oral)   Resp 18   Ht 1.951 m (6' 4.8\")   Wt 77.5 kg (170 lb 13.7 oz)   SpO2 93%   BMI 20.37 kg/m²   Gen: alert, no apparent distress  CV: regular rate and rhythm, no murmurs, no peripheral edema  Resp: clear to ascultation bilaterally, normal respiratory effort  GI: soft, non-tender abdomen, bowel sounds present  Neuro: notable for left visual field cut, delayed processing    Recent Results (from the past 72 hour(s))   CBC with Differential    Collection Time: 02/29/20  5:53 AM   Result Value Ref Range    WBC 5.0 4.8 - 10.8 K/uL    RBC 4.29 (L) 4.70 - 6.10 M/uL    Hemoglobin 13.8 (L) 14.0 - 18.0 g/dL    Hematocrit 39.6 (L) 42.0 - 52.0 %    MCV 92.3 81.4 - 97.8 fL    MCH 32.2 27.0 - 33.0 pg    MCHC 34.8 33.7 - 35.3 g/dL    RDW 49.2 35.9 - 50.0 fL    Platelet Count 154 (L) 164 - 446 K/uL    MPV 12.2 9.0 - 12.9 fL    Neutrophils-Polys 70.50 44.00 - 72.00 %    Lymphocytes 17.40 (L) 22.00 - 41.00 %    Monocytes 7.10 0.00 - 13.40 %    Eosinophils 3.60 0.00 - 6.90 %    Basophils 1.00 0.00 - 1.80 %    Immature Granulocytes 0.40 0.00 - 0.90 %    Nucleated RBC 0.00 /100 WBC    Neutrophils (Absolute) 3.49 1.82 - 7.42 K/uL    Lymphs (Absolute) 0.86 (L) 1.00 - 4.80 K/uL    Monos (Absolute) 0.35 0.00 - 0.85 K/uL    Eos (Absolute) 0.18 0.00 - 0.51 K/uL    Baso (Absolute) 0.05 0.00 - 0.12 K/uL    Immature Granulocytes (abs) 0.02 0.00 - 0.11 K/uL    NRBC (Absolute) 0.00 K/uL   Comp Metabolic Panel (CMP)    Collection Time: 02/29/20  5:53 AM   Result Value Ref Range    Sodium 138 135 - 145 mmol/L    " Potassium 3.2 (L) 3.6 - 5.5 mmol/L    Chloride 105 96 - 112 mmol/L    Co2 26 20 - 33 mmol/L    Anion Gap 7.0 0.0 - 11.9    Glucose 93 65 - 99 mg/dL    Bun 13 8 - 22 mg/dL    Creatinine 0.88 0.50 - 1.40 mg/dL    Calcium 8.9 8.5 - 10.5 mg/dL    AST(SGOT) 31 12 - 45 U/L    ALT(SGPT) 15 2 - 50 U/L    Alkaline Phosphatase 69 30 - 99 U/L    Total Bilirubin 1.6 (H) 0.1 - 1.5 mg/dL    Albumin 3.7 3.2 - 4.9 g/dL    Total Protein 6.0 6.0 - 8.2 g/dL    Globulin 2.3 1.9 - 3.5 g/dL    A-G Ratio 1.6 g/dL   Magnesium    Collection Time: 02/29/20  5:53 AM   Result Value Ref Range    Magnesium 1.7 1.5 - 2.5 mg/dL   Vitamin D, 25-hydroxy (blood)    Collection Time: 02/29/20  5:53 AM   Result Value Ref Range    25-Hydroxy   Vitamin D 25 15 (L) 30 - 100 ng/mL   proBrain Natriuretic Peptide, NT    Collection Time: 02/29/20  5:53 AM   Result Value Ref Range    NT-proBNP 230 (H) 0 - 125 pg/mL   HEMOGLOBIN A1C    Collection Time: 02/29/20  5:53 AM   Result Value Ref Range    Glycohemoglobin 5.5 0.0 - 5.6 %    Est Avg Glucose 111 mg/dL   ESTIMATED GFR    Collection Time: 02/29/20  5:53 AM   Result Value Ref Range    GFR If African American >60 >60 mL/min/1.73 m 2    GFR If Non African American >60 >60 mL/min/1.73 m 2   Basic Metabolic Panel    Collection Time: 03/01/20  5:56 AM   Result Value Ref Range    Sodium 139 135 - 145 mmol/L    Potassium 3.6 3.6 - 5.5 mmol/L    Chloride 105 96 - 112 mmol/L    Co2 26 20 - 33 mmol/L    Glucose 106 (H) 65 - 99 mg/dL    Bun 13 8 - 22 mg/dL    Creatinine 0.85 0.50 - 1.40 mg/dL    Calcium 9.0 8.5 - 10.5 mg/dL    Anion Gap 8.0 0.0 - 11.9   Lipid Profile    Collection Time: 03/01/20  5:56 AM   Result Value Ref Range    Cholesterol,Tot 75 (L) 100 - 199 mg/dL    Triglycerides 129 0 - 149 mg/dL    HDL 17 (A) >=40 mg/dL    LDL 32 <100 mg/dL   ESTIMATED GFR    Collection Time: 03/01/20  5:56 AM   Result Value Ref Range    GFR If African American >60 >60 mL/min/1.73 m 2    GFR If Non  >60 >60  mL/min/1.73 m 2   EKG    Collection Time: 20  5:52 PM   Result Value Ref Range    Report       [facility]    Test Date:  2020  Pt Name:    THUAN MICHEL               Department: [facility]  MRN:        [id number]                      Room:       [facility]  Gender:     Male                         Technician: TALI  :        [id number]                   Requested By:FANTA LOREDO  Order #:    [id number]                    Reading MD: Colby Mroin MD    Measurements  Intervals                                Axis  Rate:       79                           P:          50  OK:         192                          QRS:        -20  QRSD:       88                           T:          -3  QT:         408  QTc:        468    Interpretive Statements  SINUS RHYTHM  BORDERLINE LEFT AXIS DEVIATION  PROBABLE POSTERIOR INFARCT  NONSPECIFIC T ABNORMALITIES, LATERAL LEADS  No previous ECG available for comparison  Electronically Signed On 3-2-2020 8:22:16 PST by Colby Morin MD         Current Facility-Administered Medications   Medication Frequency   • vitamin D (cholecalciferol) tablet 2,000 Units DAILY   • Respiratory Therapy Consult Continuous RT   • Pharmacy Consult Request ...Pain Management Review 1 Each PHARMACY TO DOSE   • acetaminophen (TYLENOL) tablet 650 mg Q4HRS PRN   • hydrALAZINE (APRESOLINE) tablet 25 mg Q8HRS PRN   • senna-docusate (PERICOLACE or SENOKOT S) 8.6-50 MG per tablet 2 Tab BID    And   • polyethylene glycol/lytes (MIRALAX) PACKET 1 Packet QDAY PRN    And   • magnesium hydroxide (MILK OF MAGNESIA) suspension 30 mL QDAY PRN    And   • bisacodyl (DULCOLAX) suppository 10 mg QDAY PRN   • artificial tears ophthalmic solution 1 Drop PRN   • benzocaine-menthol (CEPACOL) lozenge 1 Lozenge Q2HRS PRN   • mag hydrox-al hydrox-simeth (MAALOX PLUS ES or MYLANTA DS) suspension 20 mL Q2HRS PRN   • ondansetron (ZOFRAN ODT) dispertab 4 mg 4X/DAY PRN    Or   • ondansetron (ZOFRAN) syringe/vial injection 4 mg  4X/DAY PRN   • traZODone (DESYREL) tablet 50 mg QHS PRN   • sodium chloride (OCEAN) 0.65 % nasal spray 2 Spray PRN   • hydrOXYzine HCl (ATARAX) tablet 50 mg Q6HRS PRN   • melatonin tablet 3 mg HS PRN   • enoxaparin (LOVENOX) inj 40 mg QHS   • lactulose 20 GM/30ML solution 30 mL QDAY PRN   • docusate sodium (ENEMEEZ) enema 283 mg QDAY PRN   • Eltrombopag Olamine TABS 50 mg DAILY   • allopurinol (ZYLOPRIM) tablet 300 mg DAILY   • aspirin EC (ECOTRIN) tablet 325 mg DAILY       Orders Placed This Encounter   Procedures   • Diet Order Regular     Standing Status:   Standing     Number of Occurrences:   1     Order Specific Question:   Diet:     Answer:   Regular [1]       Assessment:  Active Hospital Problems    Diagnosis   • *Cerebrovascular accident (CVA) due to thrombosis of right posterior cerebral artery (HCC)   • Vitamin D deficiency   • Visual field cut   • Cognitive deficits   • Essential hypertension   • History of ITP   • History of gout     This patient is a 84 y.o. male admitted for acute inpatient rehabilitation with Cerebrovascular accident (CVA) due to thrombosis of right posterior cerebral artery (HCC).    Medical Decision Making and Plan:    Right PCA stroke  Left homonymous hemianopsia  Left pronator drift  Dominant  Cognitive deficits, severe  Continue full rehab program  PT/OT/SLP, 1 hr each discipline, 5 days per week  Continue aspirin for secondary stroke prophylaxis  Statin not indicated, LDL 32    History of gout  Continue allopurinol    History of Immune thrombocytopenia  Continue home medication, Eltrombopag  Monitor labs    Vit D deficiency, 15  Continue supplementation    Bowel  Constipation  Continue bowel meds  Last BM 2/29    Bladder  Check PVRs - 289  ICP for over 400 cc  Scheduled toileting     DVT prophylaxis  Lovenox    Total time:  35 minutes.  I spent greater than 50% of the time for patient care, counseling, and coordination on this date, including patient face-to face time,  unit/floor time with review of records/pertinent lab data and studies, as well as discussing diagnostic evaluation/work up, planned therapeutic interventions, and future disposition of care, as per the interval events/subjective and the assessment and plan as noted above.    Elba Chase M.D.   Physical Medicine and Rehabilitation

## 2020-03-02 NOTE — DISCHARGE PLANNING
"CASE MANAGEMENT INITIAL ASSESSMENT    Admit Date:  2/28/2020     I met with patient and contacted wife, Kermit, by phone to discuss role of case management / discharge planning / team conference. Patient is not oriented to month or year, but he was aware he was in a hospital. Patient lives with his wife in a single level home with 2 entry steps and two interior steps. The majority of information for the assessment was obtained from wife.     Patient is a  84 y.o. male transferred from [facility].  Patient was admitted to [facility] on 2/28/2020.  The admitting physician is Dr. Elba Chase.       Diagnosis: 0.1.1 (l) body involvement (r) brain  Stroke (cerebrum) (HCC)    Co-morbidities:   Patient Active Problem List    Diagnosis Date Noted   • Cerebrovascular accident (CVA) due to thrombosis of right posterior cerebral artery (HCC) 02/28/2020   • Essential hypertension 02/28/2020   • History of ITP 02/28/2020   • History of gout 02/28/2020     Prior Living Situation:  Housing / Facility: 1 Story House  Lives with - Patient's Self Care Capacity: Spouse    Prior Level of Function:  Medication Management: Unable To Determine At This Time  Finances: Independent  Home Management: Independent  Shopping: Independent  Prior Level Of Mobility: Unable to Determine At This Time  Driving / Transportation: Driving Independent    Support Systems:  Primary : Shelby LeannKermitDolores Cabrera [phone]  Other support systems: Brother from [place], [place].  TAMELA lives next door in a travel trailer. Two \"good neighbors\"       Previous Services Utilized:   Equipment Owned: None  Prior Services: Home-Independent    Other Information:  Occupation (Pre-Hospital Vocational): Retired Due To Age     Primary Payor Source: Medicare A, Medicare B  Secondary Payor Source: Supplemental Insurance()  Primary Care Practitioner : Unknown    Patient / Family Goal:  Patient / Family Goal: To get " much better. To walk good enough to be safe.    DC Disposition: To single level home with two step entry (no rails) and two interior steps from kitchen to family room (no rails and narrow), with support of wife. Patient's brother from [place] will install needed grab bars and come from [place] when patient is discharged to help transition patient home.  DC Needs: Family training.  Anticipate home health care.  MD f/u appointments - unknown PCP.   DME needs TBD. Patient has no DME. Wife has two canes.   Strengths: PLOF - , independent. Supportive wife and brother.     Additional Case Management Questions:  Have you ever received case management services for yourself or a family member? no  Do you feel you have and an understanding of what services  provide? yes    Do you have any additional questions regarding case management?   Not at this time.        CASE MANAGEMENT PLAN OF CARE   Individualized Goals:   1. Improve functional and cognitive status to return home with wife's and brother's support  2. MD f/u appts in place    Barriers:   1. Functional deficits  2. Cognitive deficits      Plan:  1. Continue to follow patient through hospitalization and provide discharge planning in collaboration with patient, family, physicians and ancillary services.     2. Utilize community resources to ensure a safe discharge.

## 2020-03-02 NOTE — CARE PLAN
Problem: Safety  Goal: Will remain free from injury  Outcome: PROGRESSING SLOWER THAN EXPECTED  Note: Pt is confused and impulsive. Bed alarm, hourly rounding and camera monitored room for safety precautions.     Problem: Pain Management  Goal: Pain level will decrease to patient's comfort goal  Outcome: PROGRESSING AS EXPECTED  Note: Pt denies pain or discomfort tonight. He's on scheduled trazodone but sleeps on and off. Will continue to monitor.

## 2020-03-03 PROCEDURE — 700102 HCHG RX REV CODE 250 W/ 637 OVERRIDE(OP): Performed by: PHYSICAL MEDICINE & REHABILITATION

## 2020-03-03 PROCEDURE — 770010 HCHG ROOM/CARE - REHAB SEMI PRIVAT*

## 2020-03-03 PROCEDURE — A9270 NON-COVERED ITEM OR SERVICE: HCPCS | Performed by: PHYSICAL MEDICINE & REHABILITATION

## 2020-03-03 PROCEDURE — 97535 SELF CARE MNGMENT TRAINING: CPT

## 2020-03-03 PROCEDURE — 97116 GAIT TRAINING THERAPY: CPT

## 2020-03-03 PROCEDURE — 97530 THERAPEUTIC ACTIVITIES: CPT

## 2020-03-03 PROCEDURE — 99233 SBSQ HOSP IP/OBS HIGH 50: CPT | Performed by: PHYSICAL MEDICINE & REHABILITATION

## 2020-03-03 PROCEDURE — 97130 THER IVNTJ EA ADDL 15 MIN: CPT

## 2020-03-03 PROCEDURE — 700111 HCHG RX REV CODE 636 W/ 250 OVERRIDE (IP): Performed by: PHYSICAL MEDICINE & REHABILITATION

## 2020-03-03 PROCEDURE — 97129 THER IVNTJ 1ST 15 MIN: CPT

## 2020-03-03 RX ORDER — QUETIAPINE FUMARATE 25 MG/1
25 TABLET, FILM COATED ORAL EVERY 8 HOURS PRN
Status: DISCONTINUED | OUTPATIENT
Start: 2020-03-03 | End: 2020-03-14 | Stop reason: HOSPADM

## 2020-03-03 RX ADMIN — POLYETHYLENE GLYCOL 3350 1 PACKET: 17 POWDER, FOR SOLUTION ORAL at 08:37

## 2020-03-03 RX ADMIN — MELATONIN 2000 UNITS: at 08:36

## 2020-03-03 RX ADMIN — ENOXAPARIN SODIUM 40 MG: 100 INJECTION SUBCUTANEOUS at 21:19

## 2020-03-03 RX ADMIN — ASPIRIN 325 MG: 325 TABLET, COATED ORAL at 08:36

## 2020-03-03 RX ADMIN — SENNOSIDES AND DOCUSATE SODIUM 2 TABLET: 8.6; 5 TABLET ORAL at 08:36

## 2020-03-03 RX ADMIN — ALLOPURINOL 300 MG: 300 TABLET ORAL at 08:36

## 2020-03-03 ASSESSMENT — 10 METER WALK TEST (10METWT)
TRIAL 1: TIME TO WALK 10 METERS: 27
AVERAGE VELOCITY - METERS PER SECOND: .2
TRIAL 3: TIME TO WALK 10 METERS: 38
AVERAGE TIME - SECONDS: 29.67
TRIAL 2: TIME TO WALK 10 METERS: 24

## 2020-03-03 NOTE — PROGRESS NOTES
"Rehab Progress Note     Date of Service: 3/3/2020  Chief Complaint: follow up stroke    Interval Events (Subjective)    Patient seen and examined in the therapy gym today. He is trying to get up out of his WC, alarm going off, seatbelt not locked. Nursing reports increased confusion over night, was wanting to call the police as he thought we were holding him captive. This morning he is asking me several times where his wife is. He denies any pain. He denies any dysuria. Nursing reports increased urinary frequency.     Objective:  VITAL SIGNS: /75   Pulse 91   Temp 36.3 °C (97.3 °F) (Oral)   Resp 18   Ht 1.951 m (6' 4.8\")   Wt 77.5 kg (170 lb 13.7 oz)   SpO2 91%   BMI 20.37 kg/m²   Gen: alert, no apparent distress  CV: regular rate and rhythm, no murmurs, no peripheral edema  Resp: clear to ascultation bilaterally, normal respiratory effort  GI: soft, non-tender abdomen, bowel sounds present  Neuro: notable for left visual field cut, disoriented to place, city, month, year    Recent Results (from the past 72 hour(s))   Basic Metabolic Panel    Collection Time: 03/01/20  5:56 AM   Result Value Ref Range    Sodium 139 135 - 145 mmol/L    Potassium 3.6 3.6 - 5.5 mmol/L    Chloride 105 96 - 112 mmol/L    Co2 26 20 - 33 mmol/L    Glucose 106 (H) 65 - 99 mg/dL    Bun 13 8 - 22 mg/dL    Creatinine 0.85 0.50 - 1.40 mg/dL    Calcium 9.0 8.5 - 10.5 mg/dL    Anion Gap 8.0 0.0 - 11.9   Lipid Profile    Collection Time: 03/01/20  5:56 AM   Result Value Ref Range    Cholesterol,Tot 75 (L) 100 - 199 mg/dL    Triglycerides 129 0 - 149 mg/dL    HDL 17 (A) >=40 mg/dL    LDL 32 <100 mg/dL   ESTIMATED GFR    Collection Time: 03/01/20  5:56 AM   Result Value Ref Range    GFR If African American >60 >60 mL/min/1.73 m 2    GFR If Non African American >60 >60 mL/min/1.73 m 2   EKG    Collection Time: 03/01/20  5:52 PM   Result Value Ref Range    Report       [facility]    Test Date:  2020-03-01  Pt Name:    THUAN" ANAND               Department: [facility]  MRN:        [id number]                      Room:       [facility]  Gender:     Male                         Technician: TALI  :        1935                   Requested By:FANTA LOREDO  Order #:    [id number]                    Reading MD: Colby Morin MD    Measurements  Intervals                                Axis  Rate:       79                           P:          50  NH:         192                          QRS:        -20  QRSD:       88                           T:          -3  QT:         408  QTc:        468    Interpretive Statements  SINUS RHYTHM  BORDERLINE LEFT AXIS DEVIATION  PROBABLE POSTERIOR INFARCT  NONSPECIFIC T ABNORMALITIES, LATERAL LEADS  No previous ECG available for comparison  Electronically Signed On 3-2-2020 8:22:16 PST by Colby Morin MD         Current Facility-Administered Medications   Medication Frequency   • polyethylene glycol/lytes (MIRALAX) PACKET 1 Packet DAILY    And   • senna-docusate (PERICOLACE or SENOKOT S) 8.6-50 MG per tablet 2 Tab BID    And   • magnesium hydroxide (MILK OF MAGNESIA) suspension 30 mL QDAY PRN    And   • bisacodyl (DULCOLAX) suppository 10 mg QDAY PRN   • vitamin D (cholecalciferol) tablet 2,000 Units DAILY   • Respiratory Therapy Consult Continuous RT   • Pharmacy Consult Request ...Pain Management Review 1 Each PHARMACY TO DOSE   • acetaminophen (TYLENOL) tablet 650 mg Q4HRS PRN   • hydrALAZINE (APRESOLINE) tablet 25 mg Q8HRS PRN   • artificial tears ophthalmic solution 1 Drop PRN   • benzocaine-menthol (CEPACOL) lozenge 1 Lozenge Q2HRS PRN   • mag hydrox-al hydrox-simeth (MAALOX PLUS ES or MYLANTA DS) suspension 20 mL Q2HRS PRN   • ondansetron (ZOFRAN ODT) dispertab 4 mg 4X/DAY PRN    Or   • ondansetron (ZOFRAN) syringe/vial injection 4 mg 4X/DAY PRN   • traZODone (DESYREL) tablet 50 mg QHS PRN   • sodium chloride (OCEAN) 0.65 % nasal spray 2 Spray PRN   • hydrOXYzine HCl (ATARAX) tablet 50 mg Q6HRS PRN    • melatonin tablet 3 mg HS PRN   • enoxaparin (LOVENOX) inj 40 mg QHS   • lactulose 20 GM/30ML solution 30 mL QDAY PRN   • docusate sodium (ENEMEEZ) enema 283 mg QDAY PRN   • Eltrombopag Olamine TABS 50 mg DAILY   • allopurinol (ZYLOPRIM) tablet 300 mg DAILY   • aspirin EC (ECOTRIN) tablet 325 mg DAILY       Orders Placed This Encounter   Procedures   • Diet Order Regular     Standing Status:   Standing     Number of Occurrences:   1     Order Specific Question:   Diet:     Answer:   Regular [1]       Assessment:  Active Hospital Problems    Diagnosis   • *Cerebrovascular accident (CVA) due to thrombosis of right posterior cerebral artery (HCC)   • Vitamin D deficiency   • Visual field cut   • Cognitive deficits   • Essential hypertension   • History of ITP   • History of gout     This patient is a 84 y.o. male admitted for acute inpatient rehabilitation with Cerebrovascular accident (CVA) due to thrombosis of right posterior cerebral artery (HCC).    Therapy update 3/3/2020  Supervision eating  Min assist grooming  Min assist bathing  Supervision upper body dressing  Min assist lower body dressing  Min assist toileting  Total assistbladder  Total assist bowel  Min assist bed/chair transfer  Min assist toilet transfer  Min assist tub/shower transfer  Total assist ambulation  Max assist wheelchair propulsion  Not tested stairs  Supervision comprehension  Supervision expression  Supervision social interaction  Min assist problem solving  Max assist memory    We will have his first weekly conference tomorrow to discuss a discharge date.    Medical Decision Making and Plan:    Right PCA stroke  Left homonymous hemianopsia  Left pronator drift  Dominant  Cognitive deficits, severe  Continue full rehab program  PT/OT/SLP, 1 hr each discipline, 5 days per week  Continue aspirin for secondary stroke prophylaxis  Statin not indicated, LDL 32  Suspect his cognitive deficits are partly pre-morbid, as they aren't explained by  a PCA stroke, SLP to talk to his wife about pre-stroke cognition    Encephalopathy  Check UA  Order PRN Seroquel for agitation/paranoia at night    History of gout  Continue allopurinol    History of Immune thrombocytopenia  Continue home medication, Eltrombopag  Monitor labs, recheck in am    Vit D deficiency, 15  Continue supplementation    Bowel  Constipation  Continue bowel meds  Last BM 3/2    Bladder  Check PVRs - 289  Check UA due to confusion  ICP for over 400 cc  Scheduled toileting     DVT prophylaxis  Lovenox    Total time:  36 minutes.  I spent greater than 50% of the time for patient care, counseling, and coordination on this date, including patient face-to face time, unit/floor time with review of records/pertinent lab data and studies, as well as discussing diagnostic evaluation/work up, planned therapeutic interventions, and future disposition of care, as per the interval events/subjective and the assessment and plan as noted above.    I have performed a physical exam, reviewed and updated ROS, as well as the assessment and plan today 3/3/2020. In review of note from 3/2/2020 there are no new changes except as documented above.          Elba Chase M.D.   Physical Medicine and Rehabilitation

## 2020-03-03 NOTE — THERAPY
Speech Language Pathology  Daily Treatment     Patient Name: Chilo Cabrera  Age:  84 y.o., Sex:  male  Medical Record #: [id number]  Today's Date: 3/3/2020     Subjective    Pt pleasant and cooperative, wife present and supportive.     Objective     03/03/20 1303   SLP Total Time Spent   SLP Individual Total Time Spent (Mins) 30   Charge Group   SLP Cognitive Skill Development First 15 Minutes 1   SLP Cognitive Skill Development Additional 15 Minutes 1       Assessment    Update provided to pt and wife re: current cognitive status with review of SCCAN which indicated severe cognitive impairments. O-log continued with increased score of 16/30 (yesterday 12/30). Ongoing education provided re: current need for 24 hour care due to impaired cognition, impulsivity and visual deficits.     Plan    o-log, simple orientation and attention tasks

## 2020-03-03 NOTE — THERAPY
Physical Therapy   Daily Treatment     Patient Name: Chilo Cabrera  Age:  84 y.o., Sex:  male  Medical Record #: [id number]  Today's Date: 3/3/2020     Precautions  Precautions: Fall Risk  Comments: left hemianopsia    Subjective    Pt reports he needs to call the  because his wife is missing after their bus ride this morning      Objective       03/03/20 0831   Lower Extremity Outcome Measures   Lower Extremity Outcome Measures Utilized 10 Meter Walk Test   10 Meter Walk Test   Normal - Trial 1 27 seconds   Normal - Trial 2 24 seconds   Normal - Trial 3 38 seconds   Normal Average Time 29.67 seconds   Normal Average Velocity (m/s) 0.2   Interdisciplinary Plan of Care Collaboration   Patient Position at End of Therapy Seated  (handoff OT)   PT Total Time Spent   PT Individual Total Time Spent (Mins) 60   PT Charge Group   PT Gait Training 2   PT Therapeutic Activities 2       FIM Walking Score:  4 - Minimal Assistance  Walking Description:  Walker, Safety concerns, Verbal cueing, Extra time, Requires incidental assist, Assist device/equipment(245 ft FWW, cues for FWW mangement, and for pathfinding and L attention,  min A for occasional LOB requiring assist)    Practice navigating FWW in tight spaces - requires max cues to avoid objects on L side - unable to follow cues. Unable to turn head to left without visual tracker starting on R side and going left. Consistent running into objects on left.     Assessment    Pt limited this session by increased confusion, and severe L visual deficits. Unable to follow cues well this session.  Demos high fall risk via gait speed.    Plan    Walk FWW, Vanessa, trial estim, Vector no AD, lite gait for increased speed, dynamic balance challenges, navigating tight spaces/home environment with FWW, L attention and LLE forced use, family training as able,

## 2020-03-03 NOTE — THERAPY
Occupational Therapy  Daily Treatment     Patient Name: Chilo Cabrera  Age:  84 y.o., Sex:  male  Medical Record #: [id number]  Today's Date: 3/3/2020     Precautions  Precautions: (P) Fall Risk  Comments: (P) left hemianopsia    Safety   ADL Safety : (P) Requires Supervision for Safety, Requires Cueing for Safety  Bathroom Safety: (P) Requires Supervision for Safety, Requires Cuing for Safety    Subjective    No new issues reported     Objective       03/03/20 0931   Precautions   Precautions Fall Risk   Comments left hemianopsia   Safety    ADL Safety  Requires Supervision for Safety;Requires Cueing for Safety   Bathroom Safety Requires Supervision for Safety;Requires Cuing for Safety   Vitals   O2 Delivery Device None - Room Air   Pain 0 - 10 Group   Therapist Pain Assessment Post Activity Pain Same as Prior to Activity;Nurse Notified;0   Cognition    Cognition / Consciousness X   Orientation Level Not Oriented to Year;Not Oriented to Month;Not Oriented to Day   Level of Consciousness Alert   Ability To Follow Commands 1 Step   Safety Awareness Impaired   Attention Impaired   Passive ROM Upper Body   Passive ROM Upper Body WDL   Active ROM Upper Body   Active ROM Upper Body  WDL   Dominant Hand Left   IADL Treatments   Home Management laundry folding task, seated   Comments cues to fully attend to left    Balance   Sitting Balance (Static) Fair +   Sitting Balance (Dynamic) Fair   Standing Balance (Static) Fair   Standing Balance (Dynamic) Fair -   Weight Shift Sitting Fair   Weight Shift Standing Fair   Skilled Intervention Tactile Cuing;Verbal Cuing   Bed Mobility    Scooting Supervised  (in sitting)   Skilled Intervention Verbal Cuing   Interdisciplinary Plan of Care Collaboration   IDT Collaboration with  Nursing   Patient Position at End of Therapy Seated;Chair Alarm On;Self Releasing Lap Belt Applied   Collaboration Comments reviewed progress with nurse, returned to gym after session   OT Total Time Spent    OT Individual Total Time Spent (Mins) 30   OT Charge Group   Charges Yes   OT Self Care / ADL 1   OT Therapy Activity 1       FIM Grooming Score:  4 - Minimal Assistance  Grooming Description:  Increased time, Supervision for safety, Verbal cueing, Set-up of equipment            Assessment    Tolerated all activity with cues for sequence and safety. Scanning during ADL tasks with visual and vebralcues,     Plan    Continue current POC

## 2020-03-03 NOTE — THERAPY
"Speech Language Pathology  Daily Treatment     Patient Name: Chilo Cabrera  Age:  84 y.o., Sex:  male  Medical Record #: [id number]  Today's Date: 3/3/2020     Subjective    \"Where is my wife, last night I think I left on a bus.\"     Objective       03/03/20 1033   SLP Total Time Spent   SLP Individual Total Time Spent (Mins) 30   Charge Group   SLP Cognitive Skill Development First 15 Minutes 1   SLP Cognitive Skill Development Additional 15 Minutes 1       Assessment    Pt noted to be confused and perseverative regarding need to see his wife. Wife phoned at this time and pt became calm. Pt did report to SLP that he thought he had left on a bus last night and came back. Pt indep stated Im not sure if I dreamed it or if it really happened. Ongoing education provided to pt regarding deficits after stroke and confusion.     Plan    Orientation, simple visual scanning and attention - low level    "

## 2020-03-03 NOTE — REHAB-PHARMACY IDT TEAM NOTE
Pharmacy   Pharmacy  Antibiotics/Antifungals/Antivirals:  Medication:      Active Orders (From admission, onward)    None        Route:         n/a  Stop Date:  n/a  Reason:   Antihypertensives/Cardiac:  Medication:    Orders (72h ago, onward)     Start     Ordered    03/01/20 1745  carvedilol (COREG) tablet 6.25 mg  ONCE      03/01/20 1725    02/28/20 1518  hydrALAZINE (APRESOLINE) tablet 25 mg  EVERY 8 HOURS PRN      02/28/20 1520              Patient Vitals for the past 24 hrs:   BP Pulse   03/03/20 0700 135/75 91   03/02/20 1828 135/78 71   03/02/20 1430 131/71 75     Anticoagulation:  Medication:  Lovenox  INR:      Other key medications: eltrombopag, quetiapine  A review of the medication list reveals no issues at this time. Patient is currently on an antihypertensive. Recommend home blood pressure monitoring/recording if antihypertensive regimen continues.  Section completed by:  Ofelia Bowman [facility]

## 2020-03-04 LAB
ALBUMIN SERPL BCP-MCNC: 3.5 G/DL (ref 3.2–4.9)
ALBUMIN/GLOB SERPL: 1.8 G/DL
ALP SERPL-CCNC: 61 U/L (ref 30–99)
ALT SERPL-CCNC: 26 U/L (ref 2–50)
ANION GAP SERPL CALC-SCNC: 10 MMOL/L (ref 0–11.9)
APPEARANCE UR: CLEAR
AST SERPL-CCNC: 34 U/L (ref 12–45)
BACTERIA #/AREA URNS HPF: NEGATIVE /HPF
BASOPHILS # BLD AUTO: 0.7 % (ref 0–1.8)
BASOPHILS # BLD: 0.03 K/UL (ref 0–0.12)
BILIRUB SERPL-MCNC: 1.3 MG/DL (ref 0.1–1.5)
BILIRUB UR QL STRIP.AUTO: NEGATIVE
BUN SERPL-MCNC: 14 MG/DL (ref 8–22)
CALCIUM SERPL-MCNC: 8.4 MG/DL (ref 8.5–10.5)
CHLORIDE SERPL-SCNC: 104 MMOL/L (ref 96–112)
CO2 SERPL-SCNC: 27 MMOL/L (ref 20–33)
COLOR UR: ABNORMAL
CREAT SERPL-MCNC: 0.96 MG/DL (ref 0.5–1.4)
EOSINOPHIL # BLD AUTO: 0.12 K/UL (ref 0–0.51)
EOSINOPHIL NFR BLD: 2.9 % (ref 0–6.9)
EPI CELLS #/AREA URNS HPF: NEGATIVE /HPF
ERYTHROCYTE [DISTWIDTH] IN BLOOD BY AUTOMATED COUNT: 49.1 FL (ref 35.9–50)
GLOBULIN SER CALC-MCNC: 2 G/DL (ref 1.9–3.5)
GLUCOSE SERPL-MCNC: 93 MG/DL (ref 65–99)
GLUCOSE UR STRIP.AUTO-MCNC: NEGATIVE MG/DL
HCT VFR BLD AUTO: 37.8 % (ref 42–52)
HGB BLD-MCNC: 13.1 G/DL (ref 14–18)
HYALINE CASTS #/AREA URNS LPF: ABNORMAL /LPF
IMM GRANULOCYTES # BLD AUTO: 0.02 K/UL (ref 0–0.11)
IMM GRANULOCYTES NFR BLD AUTO: 0.5 % (ref 0–0.9)
KETONES UR STRIP.AUTO-MCNC: NEGATIVE MG/DL
LEUKOCYTE ESTERASE UR QL STRIP.AUTO: ABNORMAL
LYMPHOCYTES # BLD AUTO: 0.99 K/UL (ref 1–4.8)
LYMPHOCYTES NFR BLD: 23.7 % (ref 22–41)
MAGNESIUM SERPL-MCNC: 1.7 MG/DL (ref 1.5–2.5)
MCH RBC QN AUTO: 32.3 PG (ref 27–33)
MCHC RBC AUTO-ENTMCNC: 34.7 G/DL (ref 33.7–35.3)
MCV RBC AUTO: 93.3 FL (ref 81.4–97.8)
MICRO URNS: ABNORMAL
MONOCYTES # BLD AUTO: 0.4 K/UL (ref 0–0.85)
MONOCYTES NFR BLD AUTO: 9.6 % (ref 0–13.4)
NEUTROPHILS # BLD AUTO: 2.61 K/UL (ref 1.82–7.42)
NEUTROPHILS NFR BLD: 62.6 % (ref 44–72)
NITRITE UR QL STRIP.AUTO: NEGATIVE
NRBC # BLD AUTO: 0 K/UL
NRBC BLD-RTO: 0 /100 WBC
PH UR STRIP.AUTO: 5 [PH] (ref 5–8)
PLATELET # BLD AUTO: 153 K/UL (ref 164–446)
PMV BLD AUTO: 11.5 FL (ref 9–12.9)
POTASSIUM SERPL-SCNC: 3.4 MMOL/L (ref 3.6–5.5)
PROT SERPL-MCNC: 5.5 G/DL (ref 6–8.2)
PROT UR QL STRIP: NEGATIVE MG/DL
RBC # BLD AUTO: 4.05 M/UL (ref 4.7–6.1)
RBC # URNS HPF: ABNORMAL /HPF
RBC UR QL AUTO: NEGATIVE
SODIUM SERPL-SCNC: 141 MMOL/L (ref 135–145)
SP GR UR STRIP.AUTO: 1.01
UROBILINOGEN UR STRIP.AUTO-MCNC: 0.2 MG/DL
WBC # BLD AUTO: 4.2 K/UL (ref 4.8–10.8)
WBC #/AREA URNS HPF: ABNORMAL /HPF

## 2020-03-04 PROCEDURE — A9270 NON-COVERED ITEM OR SERVICE: HCPCS | Performed by: PHYSICAL MEDICINE & REHABILITATION

## 2020-03-04 PROCEDURE — 97530 THERAPEUTIC ACTIVITIES: CPT

## 2020-03-04 PROCEDURE — 770010 HCHG ROOM/CARE - REHAB SEMI PRIVAT*

## 2020-03-04 PROCEDURE — 97129 THER IVNTJ 1ST 15 MIN: CPT

## 2020-03-04 PROCEDURE — 85025 COMPLETE CBC W/AUTO DIFF WBC: CPT

## 2020-03-04 PROCEDURE — 700102 HCHG RX REV CODE 250 W/ 637 OVERRIDE(OP): Performed by: PHYSICAL MEDICINE & REHABILITATION

## 2020-03-04 PROCEDURE — 97112 NEUROMUSCULAR REEDUCATION: CPT

## 2020-03-04 PROCEDURE — 81001 URINALYSIS AUTO W/SCOPE: CPT

## 2020-03-04 PROCEDURE — 97116 GAIT TRAINING THERAPY: CPT

## 2020-03-04 PROCEDURE — 83735 ASSAY OF MAGNESIUM: CPT

## 2020-03-04 PROCEDURE — 80053 COMPREHEN METABOLIC PANEL: CPT

## 2020-03-04 PROCEDURE — 99233 SBSQ HOSP IP/OBS HIGH 50: CPT | Performed by: PHYSICAL MEDICINE & REHABILITATION

## 2020-03-04 PROCEDURE — 97130 THER IVNTJ EA ADDL 15 MIN: CPT

## 2020-03-04 PROCEDURE — 36415 COLL VENOUS BLD VENIPUNCTURE: CPT

## 2020-03-04 RX ORDER — POLYETHYLENE GLYCOL 3350 17 G/17G
1 POWDER, FOR SOLUTION ORAL
Status: DISCONTINUED | OUTPATIENT
Start: 2020-03-04 | End: 2020-03-06

## 2020-03-04 RX ORDER — POTASSIUM CHLORIDE 20 MEQ/1
40 TABLET, EXTENDED RELEASE ORAL ONCE
Status: COMPLETED | OUTPATIENT
Start: 2020-03-04 | End: 2020-03-04

## 2020-03-04 RX ORDER — BISACODYL 10 MG
10 SUPPOSITORY, RECTAL RECTAL
Status: DISCONTINUED | OUTPATIENT
Start: 2020-03-04 | End: 2020-03-06

## 2020-03-04 RX ORDER — AMOXICILLIN 250 MG
2 CAPSULE ORAL 2 TIMES DAILY
Status: DISCONTINUED | OUTPATIENT
Start: 2020-03-04 | End: 2020-03-06

## 2020-03-04 RX ADMIN — ALLOPURINOL 300 MG: 300 TABLET ORAL at 09:32

## 2020-03-04 RX ADMIN — POTASSIUM CHLORIDE 40 MEQ: 1500 TABLET, EXTENDED RELEASE ORAL at 09:35

## 2020-03-04 RX ADMIN — ASPIRIN 325 MG: 325 TABLET, COATED ORAL at 09:32

## 2020-03-04 RX ADMIN — SENNOSIDES AND DOCUSATE SODIUM 2 TABLET: 8.6; 5 TABLET ORAL at 20:26

## 2020-03-04 RX ADMIN — SENNOSIDES AND DOCUSATE SODIUM 2 TABLET: 8.6; 5 TABLET ORAL at 09:32

## 2020-03-04 RX ADMIN — POLYETHYLENE GLYCOL 3350 1 PACKET: 17 POWDER, FOR SOLUTION ORAL at 09:32

## 2020-03-04 RX ADMIN — MELATONIN 2000 UNITS: at 09:32

## 2020-03-04 ASSESSMENT — PATIENT HEALTH QUESTIONNAIRE - PHQ9
SUM OF ALL RESPONSES TO PHQ9 QUESTIONS 1 AND 2: 0
1. LITTLE INTEREST OR PLEASURE IN DOING THINGS: NOT AT ALL
2. FEELING DOWN, DEPRESSED, IRRITABLE, OR HOPELESS: NOT AT ALL

## 2020-03-04 NOTE — REHAB-CM IDT TEAM NOTE
Case Management    DC Planning  DC destination/dispostion:  To single level home with two step entry (no rails) and two interior steps from kitchen to family room (no rails and narrow), with support of wife. Patient's brother from [place] will install needed grab bars and come from [place] when patient is discharged to help transition patient home.    DC Needs: Family training.  Anticipate home health care.  MD f/u appointments - unknown PCP.   DME needs TBD. Patient has no DME. Wife has two canes.     Barriers to discharge:  Functional deficits. Cognitive deficits.     Strengths: PLOF - , independent. Supportive wife and brother.     Section completed by:  Monika Tellez R.N.

## 2020-03-04 NOTE — REHAB-ACTIVITY IDT TEAM NOTE
ACT   Recreation/Community          Strengths:  Pleasant and cooperative and Willingly participates in therapeutic activities  Barriers:  Confused, Decreased endurance, Generalized weakness, Impulsive, Limited mobility, Poor activity tolerance, Poor balance and Lack of motivation      Pt was evaluated just prior to conference. Evaluation note to follow. Pt is reported to be impulsive by CNA. He reports that he would like to work on his walking. Mod A transfer from bed to chair. Pt is easily distracted and required Mod verbal cues. Reported reading as his only leisure activity that he enjoyed.     Recreation Therapy Problems (Active)      There are no active problems.                Section completed by:  JUDY LaoS

## 2020-03-04 NOTE — CARE PLAN
Problem: Mobility  Goal: STG-Within one week, patient will ambulate up/down a curb  Description: 1) Individualized goal:  With FWW and CGA.  2) Interventions:  PT Group Therapy, PT Gait Training, PT Therapeutic Exercises, PT Neuro Re-Ed/Balance, PT Therapeutic Activity and PT Evaluation   Outcome: NOT MET  Note: NT dt safety  Goal: STG-Within one week, patient will ascend and descend four to six stairs  Description: 1) Individualized goal:  With B rails and CGA.  2) Interventions:  PT Group Therapy, PT Gait Training, PT Therapeutic Exercises, PT Neuro Re-Ed/Balance, PT Therapeutic Activity and PT Evaluation     Outcome: NOT MET  Note: NT dt safety     Problem: Mobility  Goal: STG-Within one week, patient will ambulate household distance  Description: 1) Individualized goal:  150 ft with FWW and SBA.  2) Interventions:  PT Group Therapy, PT Gait Training, PT Therapeutic Exercises, PT Neuro Re-Ed/Balance, PT Therapeutic Activity and PT Evaluation     Outcome: PROGRESSING AS EXPECTED  Note: CGA     Problem: Mobility Transfers  Goal: STG-Within one week, patient will transfer bed to chair  Description: 1) Individualized goal:  With FWW and SBA.  2) Interventions: PT Group Therapy, PT Gait Training, PT Therapeutic Exercises, PT Neuro Re-Ed/Balance, PT Therapeutic Activity and PT Evaluation   Outcome: PROGRESSING AS EXPECTED  Note: Min A     Problem: Mobility Transfers  Goal: STG-Within one week, patient will perform bed mobility  Description: 1) Individualized goal:  With SBA and bed functions as needed.  2) Interventions:  PT Group Therapy, PT Gait Training, PT Therapeutic Exercises, PT Neuro Re-Ed/Balance, PT Therapeutic Activity and PT Evaluation     Outcome: MET

## 2020-03-04 NOTE — REHAB-DIETARY IDT TEAM NOTE
"Dietary   Nutrition  Dietary Problems     Problem: Inadequate nutrient intake     Description:     Goal: Patient to consume greater than or equal to 50% of meals     Description:                     Nutrition Care/ Consult For Poor PO     Assessment:    Admitting Diagnosis: Cerebrovascular accident (CVA) due to thrombosis of right posterior cerebral artery (HCC)  Pertinent PMH: immune thrombocytopenia, TIAs, carotid artery stenosis, hypertension, cataracts, gout, HTN    Additional Information: Patient seen in room with wife present.  He is slow to answer but appears alert and oriented.  Patient states his appetite is good.  He states he is eating \"almost all\" of his trays (documentation by staff I chart is conflicting).  His wife states she is not here at meals but the patient tells her the same thing.  Patient reports usual intake of 2 meals per day.  Patient denies weight changes.  He denies issues chewing or swallowing now that he has his teeth.  Reports some meats are difficult to chew and ordering appropriate textures.  No GI/ issues.  Can self feed.  Does like to snack on candy at home (per wife) and she plans to bring some in for him.    Sent nutrition rep to round back with patient regarding menus.     Appetite: Good per patient   Diet: Regular   Average PO intake x 3 days: ~40% of meals     Labs: WBC 4.2, Platelets 153, K+ 3.4, Ca 8.4, T.P. 5.5  Medications: noted  PRN Medications: noted/ s/p 40mEq KCl  IVF: n/a     Height: 195.1cm   Weight: 77.5kg   BMI: 20.37- underweight for age with BMI <23 age > 65    Skin: CDI  GI: small, loose stool 3/3   : yellow UOP  Vitals: BP 10/89 this AM  I/Os: +720 mL x 24 hours     Nutrient Needs:  Kcal: 6415-7969 kcals/day BEE=1585  Protein: 78- 93 g/day   Fluid: 2000-2400mL/day       Malnutrition risk: no risk identified     Diagnosis: Inadequate oral intake r/t questionable fair appetite in setting of recent hospitalization with altered cognition/ memory as evidenced " by patient with intake <50% of nutrient needs/ differences in information provided to various providers when asked same questions.     Intervention/ Recommendations/POC:  1. Encourage wife to bring in candy.  Regular diet.  Will observe, provide interventions if PO does not improve.    2.Encourage adequate PO/fluid intake.  3. Nutrition rep to see regarding food prefs/ honor within dietary restrictions (if indicated)     Monitor/Evaluation: Monitor PO intake, weight, labs, medication adjustments, skin integrity, GI function, vitals, I/Os, and overall hydration status.  Adjust nutritional POC pending clinical outcomes.    RD following weekly.   Goal: Maintain >/=50% oral nutrient/fluid intake to promote nutrition optimization/healing.         Section completed by:  Maria G Davies R.D.

## 2020-03-04 NOTE — PROGRESS NOTES
"Rehab Progress Note     Date of Service: 3/4/2020  Chief Complaint: follow up stroke    Interval Events (Subjective)    Patient seen and examined today in his room.  He denies any pain.  Continues to have poor orientation.  Urinalysis was negative for infection.  I did meet his wife yesterday afternoon and it does not look like she would be able to assist him physically as she was walking with 2 canes.  She did tell speech therapy that patient was cognitively intact prior to this stroke.    Objective:  VITAL SIGNS: /89   Pulse 93   Temp 36.3 °C (97.4 °F) (Temporal)   Resp 18   Ht 1.951 m (6' 4.81\")   Wt 77.5 kg (170 lb 13.7 oz)   SpO2 91%   BMI 20.36 kg/m²   Gen: alert, no apparent distress  CV: regular rate and rhythm, no murmurs, no peripheral edema  Resp: clear to ascultation bilaterally, normal respiratory effort  GI: soft, non-tender abdomen, bowel sounds present  Neuro: notable for slow responses, disorientation, left visual field cut    Recent Results (from the past 72 hour(s))   EKG    Collection Time: 20  5:52 PM   Result Value Ref Range    Report       [facility]    Test Date:  2020  Pt Name:    THUAN MICHEL               Department: [facility]  MRN:        [id number]                      Room:       [facility]  Gender:     Male                         Technician: TALI  :        [id number]                   Requested By:FANTA LOREDO  Order #:    [id number]                    Reading MD: Colby Morin MD    Measurements  Intervals                                Axis  Rate:       79                           P:          50  NM:         192                          QRS:        -20  QRSD:       88                           T:          -3  QT:         408  QTc:        468    Interpretive Statements  SINUS RHYTHM  BORDERLINE LEFT AXIS DEVIATION  PROBABLE POSTERIOR INFARCT  NONSPECIFIC T ABNORMALITIES, LATERAL LEADS  No previous ECG available for comparison  Electronically Signed On " 3-2-2020 8:22:16 PST by Colby Morin MD     URINALYSIS    Collection Time: 03/04/20 12:30 AM   Result Value Ref Range    Color DK Yellow     Character Clear     Specific Gravity 1.015 <1.035    Ph 5.0 5.0 - 8.0    Glucose Negative Negative mg/dL    Ketones Negative Negative mg/dL    Protein Negative Negative mg/dL    Bilirubin Negative Negative    Urobilinogen, Urine 0.2 Negative    Nitrite Negative Negative    Leukocyte Esterase Trace (A) Negative    Occult Blood Negative Negative    Micro Urine Req Microscopic    URINE MICROSCOPIC (W/UA)    Collection Time: 03/04/20 12:30 AM   Result Value Ref Range    WBC 0-2 (A) /hpf    RBC 0-2 (A) /hpf    Bacteria Negative None /hpf    Epithelial Cells Negative /hpf    Hyaline Cast 0-2 /lpf   CBC WITH DIFFERENTIAL    Collection Time: 03/04/20  6:19 AM   Result Value Ref Range    WBC 4.2 (L) 4.8 - 10.8 K/uL    RBC 4.05 (L) 4.70 - 6.10 M/uL    Hemoglobin 13.1 (L) 14.0 - 18.0 g/dL    Hematocrit 37.8 (L) 42.0 - 52.0 %    MCV 93.3 81.4 - 97.8 fL    MCH 32.3 27.0 - 33.0 pg    MCHC 34.7 33.7 - 35.3 g/dL    RDW 49.1 35.9 - 50.0 fL    Platelet Count 153 (L) 164 - 446 K/uL    MPV 11.5 9.0 - 12.9 fL    Neutrophils-Polys 62.60 44.00 - 72.00 %    Lymphocytes 23.70 22.00 - 41.00 %    Monocytes 9.60 0.00 - 13.40 %    Eosinophils 2.90 0.00 - 6.90 %    Basophils 0.70 0.00 - 1.80 %    Immature Granulocytes 0.50 0.00 - 0.90 %    Nucleated RBC 0.00 /100 WBC    Neutrophils (Absolute) 2.61 1.82 - 7.42 K/uL    Lymphs (Absolute) 0.99 (L) 1.00 - 4.80 K/uL    Monos (Absolute) 0.40 0.00 - 0.85 K/uL    Eos (Absolute) 0.12 0.00 - 0.51 K/uL    Baso (Absolute) 0.03 0.00 - 0.12 K/uL    Immature Granulocytes (abs) 0.02 0.00 - 0.11 K/uL    NRBC (Absolute) 0.00 K/uL   Comp Metabolic Panel    Collection Time: 03/04/20  6:19 AM   Result Value Ref Range    Sodium 141 135 - 145 mmol/L    Potassium 3.4 (L) 3.6 - 5.5 mmol/L    Chloride 104 96 - 112 mmol/L    Co2 27 20 - 33 mmol/L    Anion Gap 10.0 0.0 - 11.9     Glucose 93 65 - 99 mg/dL    Bun 14 8 - 22 mg/dL    Creatinine 0.96 0.50 - 1.40 mg/dL    Calcium 8.4 (L) 8.5 - 10.5 mg/dL    AST(SGOT) 34 12 - 45 U/L    ALT(SGPT) 26 2 - 50 U/L    Alkaline Phosphatase 61 30 - 99 U/L    Total Bilirubin 1.3 0.1 - 1.5 mg/dL    Albumin 3.5 3.2 - 4.9 g/dL    Total Protein 5.5 (L) 6.0 - 8.2 g/dL    Globulin 2.0 1.9 - 3.5 g/dL    A-G Ratio 1.8 g/dL   MAGNESIUM    Collection Time: 03/04/20  6:19 AM   Result Value Ref Range    Magnesium 1.7 1.5 - 2.5 mg/dL   ESTIMATED GFR    Collection Time: 03/04/20  6:19 AM   Result Value Ref Range    GFR If African American >60 >60 mL/min/1.73 m 2    GFR If Non African American >60 >60 mL/min/1.73 m 2       Current Facility-Administered Medications   Medication Frequency   • QUEtiapine (SEROQUEL) tablet 25 mg Q8HRS PRN   • polyethylene glycol/lytes (MIRALAX) PACKET 1 Packet DAILY    And   • senna-docusate (PERICOLACE or SENOKOT S) 8.6-50 MG per tablet 2 Tab BID    And   • magnesium hydroxide (MILK OF MAGNESIA) suspension 30 mL QDAY PRN    And   • bisacodyl (DULCOLAX) suppository 10 mg QDAY PRN   • vitamin D (cholecalciferol) tablet 2,000 Units DAILY   • Respiratory Therapy Consult Continuous RT   • Pharmacy Consult Request ...Pain Management Review 1 Each PHARMACY TO DOSE   • acetaminophen (TYLENOL) tablet 650 mg Q4HRS PRN   • hydrALAZINE (APRESOLINE) tablet 25 mg Q8HRS PRN   • artificial tears ophthalmic solution 1 Drop PRN   • benzocaine-menthol (CEPACOL) lozenge 1 Lozenge Q2HRS PRN   • mag hydrox-al hydrox-simeth (MAALOX PLUS ES or MYLANTA DS) suspension 20 mL Q2HRS PRN   • ondansetron (ZOFRAN ODT) dispertab 4 mg 4X/DAY PRN    Or   • ondansetron (ZOFRAN) syringe/vial injection 4 mg 4X/DAY PRN   • traZODone (DESYREL) tablet 50 mg QHS PRN   • sodium chloride (OCEAN) 0.65 % nasal spray 2 Spray PRN   • hydrOXYzine HCl (ATARAX) tablet 50 mg Q6HRS PRN   • melatonin tablet 3 mg HS PRN   • enoxaparin (LOVENOX) inj 40 mg QHS   • lactulose 20 GM/30ML solution 30  mL QDAY PRN   • docusate sodium (ENEMEEZ) enema 283 mg QDAY PRN   • Eltrombopag Olamine TABS 50 mg DAILY   • allopurinol (ZYLOPRIM) tablet 300 mg DAILY   • aspirin EC (ECOTRIN) tablet 325 mg DAILY       Orders Placed This Encounter   Procedures   • Diet Order Regular     Standing Status:   Standing     Number of Occurrences:   1     Order Specific Question:   Diet:     Answer:   Regular [1]       Assessment:  Active Hospital Problems    Diagnosis   • *Cerebrovascular accident (CVA) due to thrombosis of right posterior cerebral artery (HCC)   • Vitamin D deficiency   • Visual field cut   • Cognitive deficits   • Essential hypertension   • History of ITP   • History of gout     This patient is a 84 y.o. male admitted for acute inpatient rehabilitation with Cerebrovascular accident (CVA) due to thrombosis of right posterior cerebral artery (HCC).    I led and attended the weekly conference today, and agree with the IDT conference documentation and plan of care as noted below.    Date of conference: 3/4/2020    Goals and barriers: See IDT note.    Biggest barriers: bladder incontinence, severe cognitive deficits, impulsive, poor insight into deficits, very impaired vision on the left    Therapy update 3/4/2020  Supervision eating  Modified Independent grooming  Min assist bathing  Supervision upper body dressing  Min assist lower body dressing  Supervision toileting  Supervisionbladder  Supervision bowel  Min assist bed/chair transfer  Min assist toilet transfer  Min assist tub/shower transfer  Max assist ambulation  Max assist wheelchair propulsion  Not tested stairs  Supervision comprehension  Supervision expression  Supervision social interaction  Min assist problem solving  Max assist memory    CM/social support: wife supportive, but likely can't physically assist    Anticipated DC date: 3/14/2020    Home health: PT/OT/SLP/RN    Equip: FWW    Follow up: PCP, rehab clinic      Medical Decision Making and  Plan:    Right PCA stroke  Left homonymous hemianopsia  Left pronator drift  Dominant  Cognitive deficits, severe  Continue full rehab program  PT/OT/SLP, 1 hr each discipline, 5 days per week  Continue aspirin for secondary stroke prophylaxis  Statin not indicated, LDL 32    Encephalopathy  Negative UA  Order PRN Seroquel for agitation/paranoia at night    History of gout  Continue allopurinol    History of Immune thrombocytopenia  Continue home medication, Eltrombopag  Monitor labs, stable on recheck    Normocytic anemia  Mild, stable    Vit D deficiency, 15  Continue supplementation    Hypokalemia  Give one dose of potassium    Bowel  Continue bowel meds  Last BM 3/3    Bladder  Check PVRs - 186, 97, 110, 59  ICP for over 400 cc  Scheduled toileting     DVT prophylaxis  Discontinue Lovenox as he is ambulating long distances    Total time:  40 minutes.  I spent greater than 50% of the time for patient care, counseling, and coordination on this date, including patient face-to face time, unit/floor time with review of records/pertinent lab data and studies, as well as discussing diagnostic evaluation/work up, planned therapeutic interventions, and future disposition of care, as per the interval events/subjective and the assessment and plan as noted above.      Elba Chase M.D.   Physical Medicine and Rehabilitation

## 2020-03-04 NOTE — THERAPY
Occupational Therapy  Daily Treatment     Patient Name: Chilo Cabrera  Age:  84 y.o., Sex:  male  Medical Record #: [id number]  Today's Date: 3/4/2020     Precautions  Precautions: Fall Risk  Comments: left hemianopsia    Safety   ADL Safety : Requires Supervision for Safety, Impaired Insight into Safety, Requires Cueing for Safety  Bathroom Safety: Requires Supervision for Safety, Impaired Insight into Safety, Requires Cuing for Safety  Comments: See FIMs    Subjective    Pt reports that his vision feels normal compared to before his stroke.      Objective       03/04/20 1001   Precautions   Precautions Fall Risk   Comments left hemianopsia   Dynavision   Patient Position Sitting   Application Visual-motor integration;Visual-spatial integration;Attention regulation   Mode A   Time per trial (sec) 60   Number of Rings 4   Quadrants LLQ;RLQ   Number of Hits 7  (7 (trial 2), 10 (trial 3))   Average Reaction Time 8.57 sec (trial 1)  (8.57 (trial 2), 6.00 sec (trial 3))   Clinical Observations Coordination impaired;Other  (left field cut)   Interdisciplinary Plan of Care Collaboration   Patient Position at End of Therapy Seated;Self Releasing Lap Belt Applied;Chair Alarm On;Other (Comments)  (seated at nurses station d/t impulsivity )   OT Total Time Spent   OT Individual Total Time Spent (Mins) 60   OT Charge Group   OT Therapy Activity 4     Visual scanning exercises completed while seated at tabletop:   -locating vocabulary cards scattered on tabletop with images of various everyday objects  -Peterson chart exercises: visual scanning to read letters forward and backwards across rows, up and down columns, and in diagonal patterns. Use of anchor line, max cues for head turns, and intermittent [place] assistance to increase left side attention.       Assessment    Pt demonstrates significant visual deficits and lack of insight into deficits. Pt with significantly slower visual tracking to LUQ and LLQ as observed during  Dynavision and visual scanning activities noted above.     Plan    Cont'd OT for neuro re-education, vision, balance, strength, endurance, education for safe ADL's and transfers

## 2020-03-04 NOTE — THERAPY
Speech Language Pathology  Daily Treatment     Patient Name: Chilo Cabrera  Age:  84 y.o., Sex:  male  Medical Record #: [id number]  Today's Date: 3/4/2020     Subjective    Patient was pleasant throughout session.      Objective       03/04/20 1402   Cognition   Attention to Task Moderate (3)   Simple Attention Moderate (3)   Orientation  Severe (2)   Visual Scanning / Cancellation Skills Moderate (3)   SLP Total Time Spent   SLP Individual Total Time Spent (Mins) 60   Charge Group   SLP Cognitive Skill Development First 15 Minutes 1   SLP Cognitive Skill Development Additional 15 Minutes 3         Assessment    O-log was completed with a final score of 16/30, commensurate to previous score. Patient was able to identify single targets (presented one at a time) with min verbal cues. Mod cues to identify when target was on left. Significant extra time needed with all tasks.     Plan    Continue to target orientation and basic attention.

## 2020-03-04 NOTE — THERAPY
"Occupational Therapy  Daily Treatment     Patient Name: Chilo Cabrera  Age:  84 y.o., Sex:  male  Medical Record #: [id number]  Today's Date: 3/3/2020     Precautions  Precautions: (P) Fall Risk  Comments: (P) left hemianopsia    Safety   ADL Safety : (P) Requires Supervision for Safety, Impaired Insight into Safety, Requires Cueing for Safety  Bathroom Safety: (P) Requires Supervision for Safety, Impaired Insight into Safety, Requires Cuing for Safety  Comments: (P) See FIMs    Subjective    \"Call my \"Go\".\"       Objective       03/03/20 1401   Precautions   Precautions Fall Risk   Comments left hemianopsia   Safety    ADL Safety  Requires Supervision for Safety;Impaired Insight into Safety;Requires Cueing for Safety   Bathroom Safety Requires Supervision for Safety;Impaired Insight into Safety;Requires Cuing for Safety   Comments See FIMs   Pain 0 - 10 Group   Therapist Pain Assessment 0   Cognition    Level of Consciousness Alert   Ability To Follow Commands 1 Step   Safety Awareness Impaired;Impulsive   Balance   Sitting Balance (Static) Fair +   Sitting Balance (Dynamic) Fair   Standing Balance (Static) Fair   Standing Balance (Dynamic) Fair -   Weight Shift Sitting Fair   Weight Shift Standing Fair   Skilled Intervention Tactile Cuing;Verbal Cuing;Sequencing   Comments During functional transfers/ showering/dressing pursuits.  Incidental sequencing cues required for UB/LB dressing.    Interdisciplinary Plan of Care Collaboration   IDT Collaboration with  Certified Nursing Assistant;Family / Caregiver   Patient Position at End of Therapy Seated;Self Releasing Lap Belt Applied;Tray Table within Reach;Call Light within Reach;Phone within Reach   Collaboration Comments Re: patient placement following shower.  CLOF   OT Total Time Spent   OT Individual Total Time Spent (Mins) 60   OT Charge Group   Charges Yes   OT Self Care / ADL 4       FIM Eating Score:  5 - Standby Prompting/Supervision or Set-up  Eating " Description:       FIM Grooming Score:  6 - Modified Independent  Grooming Description:  (Seated shaving task with electric razor)    FIM Bathing Score:  4 - Minimal Assistance  Bathing Description:       FIM Upper Body Dressin - Standby Prompting/Supervision or Set-up  Upper Body Dressing Description:  Verbal cueing, Supervision for safety, Increased time(Multimodal cues for LUE long-sleeved shirt threading )    FIM Lower Body Dressing Score:  4 - Minimal Assistance  Lower Body Dressing Description:  Increased time, Supervision for safety, Verbal cueing, Requires minimal contact(Assist to tie L shoe only, Incidental assist to don socks over heels. )    FIM Toileting Body Dressin - Standby Prompting/Supervision or Set-up  Toileting Description:  Supervision for safety, Verbal cueing, Grab bar(Close SBA during standing urination due to patient positioning body right of toilet by ~45 degrees with incidental RUE support )    FIM Bed/Chair/Wheelchair Transfers Score:    Bed/Chair/Wheelchair Transfers Description:       FIM Toilet Transfer Score:  4 - Minimal Assistance  Toilet Transfer Description:  Grab bar, Increased time, Supervision for safety, Verbal cueing(Steady assist standing at toilet)    FIM Tub/Shower Transfers Score:  4 - Minimal Assistance  Tub/Shower Transfers Description:  Grab bar, Increased time, Supervision for safety(CGA SPT with verbal cues to utilize tub bench during showering task)      Assessment    Patient pleasant and cooperative with total body bathing/dressing tasks. Patient continues to require close SBA/CGA for ADL pursuits secondary to L lateral field cut, Fair- dynamic standing balance, and frequent redirection to minimize opportunities of loss of balance during standing bathing/toileting pursuits.  Multimodal cues provided during dressing tasks due to difficulty with L-side sequencing/initial threading with dressing tasks.       Plan    Formal visual assessment of L field cut,  blocked UB/LB dressing practice with visual feedback to increase proficiency of task, dynamic standing balance, standing endurance, visual scanning education.

## 2020-03-04 NOTE — CARE PLAN
Problem: Safety  Goal: Will remain free from injury  Outcome: PROGRESSING SLOWER THAN EXPECTED  Note: Pt is impulsive at time with alarms in place for safety     Problem: Infection  Goal: Will remain free from infection  Outcome: PROGRESSING AS EXPECTED  Note: No s/s of infection present      Problem: Venous Thromboembolism (VTW)/Deep Vein Thrombosis (DVT) Prevention:  Goal: Patient will participate in Venous Thrombosis (VTE)/Deep Vein Thrombosis (DVT)Prevention Measures  Outcome: PROGRESSING AS EXPECTED  Note: Pt receives Lovenox SC injections for DVT prophylaxis

## 2020-03-04 NOTE — REHAB-OT IDT TEAM NOTE
Occupational Therapy   Activities of Daily Living  Eating Initial:  2 - Max Assistance  Eating Current:  5 - Standby Prompting/Supervision or Set-up   Eating Description:  Modified diet, Supervision for safety, Verbal cueing, Set-up of equipment or meal/tube feeding, Increased time(Assistance with opening containers)  Grooming Initial:  5 - Standby Prompting/Supervision or Set-up  Grooming Current:  6 - Modified Independent   Grooming Description:  (Seated shaving task with electric razor)  Bathing Initial:  4 - Minimal Assistance  Bathing Current:  4 - Minimal Assistance   Bathing Description:  Grab bar, Increased time, Supervision for safety, Verbal cueing, Hand held shower, Tub bench(Steady assist during standing pursuits, multiple vqs to minimize standing in shower due to fair- standing balance/L visual field cut)  Upper Body Dressing Initial:  5 - Standby Prompting/Supervision or Set-up  Upper Body Dressing Current:  5 - Standby Prompting/Supervision or Set-up   Upper Body Dressing Description:  Verbal cueing, Supervision for safety, Increased time(Multimodal cues for LUE long-sleeved shirt threading )  Lower Body Dressing Initial:  3 - Moderate Assistance  Lower Body Dressing Current:  4 - Minimal Assistance   Lower Body Dressing Description:  4 - Minimal Assistance  Toileting Initial:  4 - Minimal Assistance  Toileting Current:  5 - Standby Prompting/Supervision or Set-up   Toileting Description:  Supervision for safety, Verbal cueing, Grab bar(Close SBA during standing urination due to patient positioning body right of toilet by ~45 degrees with incidental RUE support )  Toilet Transfer Initial:  4 - Minimal Assistance  Toilet Transfer Current:  4 - Minimal Assistance   Toilet Transfer Description:  4 - Minimal Assistance  Tub / Shower Transfer Initial:  4 - Minimal Assistance  Tub / Shower Transfer Current:  4 - Minimal Assistance   Tub / Shower Transfer Description:  Grab bar, Increased time, Supervision  for safety(CGA SPT with verbal cues to utilize tub bench during showering task)  IADL:  TBD.    Family Training/Education:  Ongoing with pt, no formal FT completed thus far; CVA recovery, rehab goals and expectations, safety with ADLs and bathroom transfers, compensatory strategies for visual deficits.    DME/DC Recommendations:  TBD; likely 24 hr spv and home health OT; need to verify bathroom set up and equipment with pt's family.      Strengths:  Pleasant and cooperative and Willingly participates in therapeutic activities  Barriers:  Confused, Decreased endurance, Generalized weakness, Impulsive, Limited mobility, Poor activity tolerance, Poor balance, Poor carryover of learning, Poor insight/denial of deficits and Other: severe visual impairments, left neglect     # of short term goals set= 3    # of short term goals met= 1     Occupational Therapy Goals     Problem: Bathing     Dates: Start: 03/04/20       Description:     Goal: STG-Within one week, patient will bathe     Dates: Start: 03/04/20       Description: 1) Individualized Goal: With supervision and verbal cues,AE/ DME prn.  2) Interventions: OT Orthotics Training, OT E Stim Attended, OT Group Therapy, OT Self Care/ADL, OT Cognitive Skill Dev, OT Community Reintegration, OT Manual Ther Technique, OT Neuro Re-Ed/Balance, OT Sensory Int Techniques, OT Therapeutic Activity, OT Ultrasound, OT Evaluation and OT Therapeutic Exercise                    Problem: Dressing     Dates: Start: 02/29/20       Description:     Goal: STG-Within one week, patient will dress LB     Dates: Start: 02/29/20   Expected End: 03/07/20       Description: 1) Individualized Goal:  With supervision and verbal cues  2) Interventions:  OT Orthotics Training, OT E Stim Attended, OT Group Therapy, OT Self Care/ADL, OT Cognitive Skill Dev, OT Community Reintegration, OT Manual Ther Technique, OT Neuro Re-Ed/Balance, OT Sensory Int Techniques, OT Therapeutic Activity, OT Ultrasound, OT  Evaluation and OT Therapeutic Exercise     Note:     Goal Note filed on 03/04/20 1134 by Tiana Kirby OT    Min A                          Problem: Functional Transfers     Dates: Start: 02/29/20       Description:     Goal: STG-Within one week, patient will transfer to toilet     Dates: Start: 02/29/20   Expected End: 03/07/20       Description: 1) Individualized Goal:  With supervision and verbal cues  2) Interventions:  OT Orthotics Training, OT E Stim Attended, OT Group Therapy, OT Self Care/ADL, OT Cognitive Skill Dev, OT Community Reintegration, OT Manual Ther Technique, OT Neuro Re-Ed/Balance, OT Sensory Int Techniques, OT Therapeutic Activity, OT Ultrasound, OT Evaluation and OT Therapeutic Exercise     Note:     Goal Note filed on 03/04/20 1134 by Tiana Kirby OT    CGA-Min A.                        Problem: OT Long Term Goals     Dates: Start: 02/29/20       Description:     Goal: LTG-By discharge, patient will complete basic self care tasks     Dates: Start: 02/29/20   Expected End: 03/21/20       Description: 1) Individualized Goal:  With mod I  2) Interventions:  OT Orthotics Training, OT E Stim Attended, OT Group Therapy, OT Self Care/ADL, OT Cognitive Skill Dev, OT Community Reintegration, OT Manual Ther Technique, OT Neuro Re-Ed/Balance, OT Sensory Int Techniques, OT Therapeutic Activity, OT Ultrasound, OT Evaluation and OT Therapeutic Exercise           Goal: LTG-By discharge, patient will complete basic home management     Dates: Start: 02/29/20   Expected End: 03/21/20       Description: 1) Individualized Goal:  With min A  2) Interventions:  OT Orthotics Training, OT E Stim Attended, OT Group Therapy, OT Self Care/ADL, OT Cognitive Skill Dev, OT Community Reintegration, OT Manual Ther Technique, OT Neuro Re-Ed/Balance, OT Sensory Int Techniques, OT Therapeutic Activity, OT Ultrasound, OT Evaluation and OT Therapeutic Exercise                       Section completed by:  Tiana PAUL  Kofi, OT

## 2020-03-04 NOTE — CARE PLAN
Problem: Problem Solving STGs  Goal: STG-Within one week, patient will  Description: 1) Individualized goal:  Complete basic single target scanning activities with 80% accuracy and MIN A.   2) Interventions:  SLP Self Care / ADL Training , SLP Cognitive Skill Development and SLP Group Treatment     Outcome: NOT MET  Note: To be addressed.     Problem: Memory STGs  Goal: STG-Within one week, patient will  Description: 1) Individualized goal:  Utilize external memory aids for orientation to date and daily therapy activities with 80% acc and MOD A.   2) Interventions:  SLP Self Care / ADL Training , SLP Cognitive Skill Development and SLP Group Treatment     Outcome: NOT MET  Note: O- log improved to 16/20 yesterday, compared to 12/20 in session previous to that.     Problem: Problem Solving STGs  Goal: STG-Within one week, patient will  Description: 1) Individualized goal:  Complete the Scales of Cognitive and Communicative Ability for Neurorehabilitation with goals developed as appropriate.   2) Interventions:  SLP Cognitive Skill Development     Outcome: MET  Note: Patient achieved a total raw score of 37, consistent with an overall severe cognitive linguistic impairment .

## 2020-03-04 NOTE — REHAB-PT IDT TEAM NOTE
Physical Therapy   Mobility  Bed mobility:   SPV  Bed /Chair/Wheelchair Transfer Initial:  4 - Minimal Assistance  Bed /Chair/Wheelchair Transfer Current:  4 - Minimal Assistance   Bed/Chair/Wheelchair Transfer Description:  Increased time, Verbal cueing, Adaptive equipment  Walk Initial:  4 - Minimal Assistance  Walk Current:  4 - Minimal Assistance   Walk Description:  Walker, Safety concerns, Verbal cueing, Extra time, Requires incidental assist, Assist device/equipment(245 ft FWW, cues for FWW mangement, and for pathfinding and L attention,  min A for occasional LOB requiring assist)  Wheelchair Initial:  2 - Max Assistance  Wheelchair Current:  2 - Max Assistance   Wheelchair Description:  Safety concerns, Requires incidental assist, Supervision for safety, Verbal cueing(50 ftx1 with BUEs propelling and supervision, verbal cues for L hemianopsia; requires assist for longer distances.)  Stairs Initial:  0 - Not tested,unsafe activity  Stairs Current: 0 - Not tested,unsafe activity   Stairs Description:    Patient/Family Training/Education:  Ongoing, focus on rehab  DME/DC Recommendations:  TBD - likely FWW, 24 hr SPV, home health therapy   Strengths:  Willingly participates in therapeutic activities  Barriers:   Confused, Decreased endurance, Generalized weakness, Limited mobility, Poor balance, Poor insight/denial of deficits and Other: severe vision impairment, Neglect L  # of short term goals set= 5  # of short term goals met=1  Physical Therapy Problems     Problem: Mobility     Dates: Start: 02/29/20       Description:     Goal: STG-Within one week, patient will ambulate household distance     Dates: Start: 02/29/20   Expected End: 03/07/20       Description: 1) Individualized goal:  150 ft with FWW and SBA.  2) Interventions:  PT Group Therapy, PT Gait Training, PT Therapeutic Exercises, PT Neuro Re-Ed/Balance, PT Therapeutic Activity and PT Evaluation       Note:     Goal Note filed on 03/03/20 1809 by  Ayush Gill, PT    CGA                  Goal: STG-Within one week, patient will ambulate up/down a curb     Dates: Start: 02/29/20   Expected End: 03/07/20       Description: 1) Individualized goal:  With FWW and CGA.  2) Interventions:  PT Group Therapy, PT Gait Training, PT Therapeutic Exercises, PT Neuro Re-Ed/Balance, PT Therapeutic Activity and PT Evaluation     Note:     Goal Note filed on 03/03/20 1809 by Ayush Gill, PT    NT dt safety                  Goal: STG-Within one week, patient will ascend and descend four to six stairs     Dates: Start: 02/29/20   Expected End: 03/07/20       Description: 1) Individualized goal:  With B rails and CGA.  2) Interventions:  PT Group Therapy, PT Gait Training, PT Therapeutic Exercises, PT Neuro Re-Ed/Balance, PT Therapeutic Activity and PT Evaluation       Note:     Goal Note filed on 03/03/20 1809 by Ayush Gill, PT    NT dt safety                        Problem: Mobility Transfers     Dates: Start: 02/29/20       Description:     Goal: STG-Within one week, patient will transfer bed to chair     Dates: Start: 02/29/20   Expected End: 03/07/20       Description: 1) Individualized goal:  With FWW and SBA.  2) Interventions: PT Group Therapy, PT Gait Training, PT Therapeutic Exercises, PT Neuro Re-Ed/Balance, PT Therapeutic Activity and PT Evaluation     Note:     Goal Note filed on 03/03/20 1809 by Ayush Gill, PT    Min A                        Problem: PT-Long Term Goals     Dates: Start: 02/29/20       Description:     Goal: LTG-By discharge, patient will ambulate     Dates: Start: 02/29/20   Expected End: 03/21/20       Description: 1) Individualized goal:  150 ft with LRAD and supervision.  2) Interventions: PT Group Therapy, PT Gait Training, PT Therapeutic Exercises, PT Neuro Re-Ed/Balance, PT Therapeutic Activity and PT Evaluation             Goal: LTG-By discharge, patient will transfer one surface to another     Dates: Start:  02/29/20   Expected End: 03/21/20       Description: 1) Individualized goal:  With LRAD mod I.  2) Interventions:  PT Group Therapy, PT Gait Training, PT Therapeutic Exercises, PT Neuro Re-Ed/Balance, PT Therapeutic Activity and PT Evaluation             Goal: LTG-By discharge, patient will ambulate up/down 4-6 stairs     Dates: Start: 02/29/20   Expected End: 03/21/20       Description: 1) Individualized goal:  With B rails and supervision.  2) Interventions:  PT Group Therapy, PT Gait Training, PT Therapeutic Exercises, PT Neuro Re-Ed/Balance, PT Therapeutic Activity and PT Evaluation             Goal: LTG-By discharge, patient will transfer in/out of a car     Dates: Start: 02/29/20   Expected End: 03/21/20       Description: 1) Individualized goal:  With LRAD and supervision.  2) Interventions:  PT Group Therapy, PT Gait Training, PT Therapeutic Exercises, PT Neuro Re-Ed/Balance, PT Therapeutic Activity and PT Evaluation           Goal: LTG-By discharge, patient will     Dates: Start: 02/29/20   Expected End: 03/21/20       Description: 1) Individualized goal:  Perform bed mobility with no adaptations independently.  2) Interventions: PT Group Therapy, PT Gait Training, PT Therapeutic Exercises, PT Neuro Re-Ed/Balance, PT Therapeutic Activity and PT Evaluation             Goal: LTG-By discharge, patient will     Dates: Start: 02/29/20   Expected End: 03/21/20       Description: 1) Individualized goal:  Navigate up/down curb with LRAD and supervision.  2) Interventions:  PT Group Therapy, PT Gait Training, PT Therapeutic Exercises, PT Neuro Re-Ed/Balance, PT Therapeutic Activity and PT Evaluation                           Section completed by:  Ayush Gill, PT

## 2020-03-04 NOTE — REHAB-SLP IDT TEAM NOTE
Speech Therapy   Cognitive Linquistic Functions  Comprehension Initial:  5 - Stand-by Prompting/Supervision or Set-up  Comprehension Current:  5 - Stand-by Prompting/Supervision or Set-up   Comprehension Description:  Glasses, Verbal cues  Expression Initial:  5 - Stand-by Prompting/Supervision or Set-up  Expression Current:  5 - Stand-by Prompting/Supervision or Set-up   Expression Description:  Verbal cueing  Social Interaction Initial:  5 - Stand-by Prompting/Supervision or Set-up  Social Interaction Current:  5 - Stand-by Prompting/Supervision or Set-up   Social Interaction Description:  Increased time, Verbal cues  Problem Solving Initial:  4 - Minimal Assistance  Problem Solving Current:  4 - Minimal Assistance   Problem Solving Description:  Verbal cueing, Bed/chair alarm, Increased time, Therapy schedule  Memory Initial:  2 - Max Assistance  Memory Current:  2 - Max Assistance   Memory Description:  Verbal cueing, Bed/chair alarm, Seat belt, Therapy schedule  Executive Functioning / Organization Initial:     Executive Functioning / Organization Current:      Executive Functioning Desciption:  TBD  Swallowing  Swallowing Status:  Patient does not currently receive dysphagia intervention.  Orders Placed This Encounter   Procedures   • Diet Order Regular     Standing Status:   Standing     Number of Occurrences:   1     Order Specific Question:   Diet:     Answer:   Regular [1]     Behavior Modification Plan  Keep instructions simple/concrete, Give clear feedback, Set clear goals, Redirect to task/topic and Analyze tasks (break down into smaller steps)  Assistive Technology  Low tech: Calendar, planner, schedule, alarms/timers, pill organizer, post-it notes, lists  Family Training/Education:  To be scheduled.  DC Recommendations:   Anticipate patient will benefit from additional ST services upon discharge from this facility.  Strengths:  Making steady progress towards goals, Pleasant and cooperative, Supportive  family and Willingly participates in therapeutic activities  Barriers:  Confused, Poor carryover of learning, Poor insight/denial of deficits and Other: visual inattention./neglect  # of short term goals set=  3  # of short term goals met= 1  Speech Therapy Problems     Problem: Memory STGs     Dates: Start: 02/29/20       Description:     Goal: STG-Within one week, patient will     Dates: Start: 02/29/20       Description: 1) Individualized goal:  Utilize external memory aids for orientation to date and daily therapy activities with 80% acc and MOD A.   2) Interventions:  SLP Self Care / ADL Training , SLP Cognitive Skill Development and SLP Group Treatment       Note:     Goal Note filed on 03/04/20 0754 by Tia Vallecillo MS,CCC-SLP    O- log improved to 16/20 yesterday, compared to 12/20 in session previous to that.                        Problem: Problem Solving STGs     Dates: Start: 02/29/20       Description:     Goal: STG-Within one week, patient will     Dates: Start: 02/29/20       Description: 1) Individualized goal:  Complete basic single target scanning activities with 80% accuracy and MIN A.   2) Interventions:  SLP Self Care / ADL Training , SLP Cognitive Skill Development and SLP Group Treatment       Note:     Goal Note filed on 03/04/20 0754 by Tia Vallecillo MS,CCC-SLP    To be addressed.                        Problem: Speech/Swallowing LTGs     Dates: Start: 02/29/20       Description:     Goal: LTG-By discharge, patient will     Dates: Start: 02/29/20       Description: 1) Individualized goal:  Solve basic to moderate problems related to health and safety using external memory aids when needed with 80% accuracy and MIN A.   2) Interventions:  SLP Self Care / ADL Training  and SLP Cognitive Skill Development, Group Treatment                          Section completed by:  Tia Vallecillo MS,CCC-SLP

## 2020-03-04 NOTE — REHAB-COLLABORATIVE ONGOING IDT TEAM NOTE
Weekly Interdisciplinary Team Conference Note    Weekly Interdisciplinary Team Conference # 1  Date:  3/4/2020    Clinicians present and reporting at team conference include the following:   MD: Elba Chase MD   RN:  Avinash Espinoza RN    PT:   Subha Campo PT, DPT  OT:  Tiana Kirby MS, OTR/L   ST:  Tia Vallecillo MS, CCC-SLP, CBIS  CM:  Monika Tellez RN CCM  REC:  Yoandy Ceballos CTRS  RT:  Not Applicable  RPh:  Karlo Brunner [facility]  Other:   None  All reporting clinicians have a working knowledge of this patient's plan of care.    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   • History of gout 02/28/2020     Results     Procedure Component Value Ref Range Date/Time    ESTIMATED GFR [[id number]] Collected:  03/04/20 0619    Order Status:  Completed Lab Status:  Final result Updated:  03/04/20 0724     GFR If African American >60 >60 mL/min/1.73 m 2      GFR If Non African American >60 >60 mL/min/1.73 m 2     Comp Metabolic Panel [[id number]]  (Abnormal) Collected:  03/04/20 0619    Order Status:  Completed Lab Status:  Final result Updated:  03/04/20 0723    Specimen:  Blood      Sodium 141 135 - 145 mmol/L      Potassium 3.4 3.6 - 5.5 mmol/L      Chloride 104 96 - 112 mmol/L      Co2 27 20 - 33 mmol/L      Anion Gap 10.0 0.0 - 11.9      Glucose 93 65 - 99 mg/dL      Bun 14 8 - 22 mg/dL      Creatinine 0.96 0.50 - 1.40 mg/dL      Calcium 8.4 8.5 - 10.5 mg/dL      AST(SGOT) 34 12 - 45 U/L      ALT(SGPT) 26 2 - 50 U/L      Alkaline Phosphatase 61 30 - 99 U/L      Total Bilirubin 1.3 0.1 - 1.5 mg/dL      Albumin 3.5 3.2 - 4.9 g/dL      Total Protein 5.5 6.0 - 8.2 g/dL      Globulin 2.0 1.9 - 3.5 g/dL      A-G  Ratio 1.8 g/dL     MAGNESIUM [[id number]] Collected:  03/04/20 0619    Order Status:  Completed Lab Status:  Final result Updated:  03/04/20 0723    Specimen:  Blood      Magnesium 1.7 1.5 - 2.5 mg/dL     URINE MICROSCOPIC (W/UA) [[id number]]  (Abnormal) Collected:  03/04/20 0030    Order Status:  Completed Lab Status:  Final result Updated:  03/04/20 0702     WBC 0-2 /hpf      Comment: Female  <12 Yr 0-2  >12 Yr 0-5  Male   None          RBC 0-2 /hpf      Comment: Female  >12 Yr 0-2  Male   None          Bacteria Negative None /hpf      Epithelial Cells Negative /hpf      Hyaline Cast 0-2 /lpf     URINALYSIS [[id number]]  (Abnormal) Collected:  03/04/20 0030    Order Status:  Completed Lab Status:  Final result Updated:  03/04/20 0702     Color DK Yellow     Character Clear     Specific Gravity 1.015 <1.035      Ph 5.0 5.0 - 8.0      Glucose Negative Negative mg/dL      Ketones Negative Negative mg/dL      Protein Negative Negative mg/dL      Bilirubin Negative Negative      Urobilinogen, Urine 0.2 Negative      Nitrite Negative Negative      Leukocyte Esterase Trace Negative      Occult Blood Negative Negative      Micro Urine Req Microscopic    CBC WITH DIFFERENTIAL [[id number]]  (Abnormal) Collected:  03/04/20 0619    Order Status:  Completed Lab Status:  Final result Updated:  03/04/20 0656    Specimen:  Blood      WBC 4.2 4.8 - 10.8 K/uL      RBC 4.05 4.70 - 6.10 M/uL      Hemoglobin 13.1 14.0 - 18.0 g/dL      Hematocrit 37.8 42.0 - 52.0 %      MCV 93.3 81.4 - 97.8 fL      MCH 32.3 27.0 - 33.0 pg      MCHC 34.7 33.7 - 35.3 g/dL      RDW 49.1 35.9 - 50.0 fL      Platelet Count 153 164 - 446 K/uL      MPV 11.5 9.0 - 12.9 fL      Neutrophils-Polys 62.60 44.00 - 72.00 %      Lymphocytes 23.70 22.00 - 41.00 %      Monocytes 9.60 0.00 - 13.40 %      Eosinophils 2.90 0.00 - 6.90 %      Basophils 0.70 0.00 - 1.80 %      Immature Granulocytes 0.50 0.00 - 0.90 %      Nucleated RBC 0.00 /100 WBC      Neutrophils (Absolute)  2.61 1.82 - 7.42 K/uL      Comment: Includes immature neutrophils, if present.        Lymphs (Absolute) 0.99 1.00 - 4.80 K/uL      Monos (Absolute) 0.40 0.00 - 0.85 K/uL      Eos (Absolute) 0.12 0.00 - 0.51 K/uL      Baso (Absolute) 0.03 0.00 - 0.12 K/uL      Immature Granulocytes (abs) 0.02 0.00 - 0.11 K/uL      NRBC (Absolute) 0.00 K/uL         Nursing  Diet/Nutrition:  Regular and Thin Liquids  Medication Administration:  Whole with Liquid Wash  % consumed at meals in last 24 hours:  Consumed 25-75% of meals per documentation.  Meal/Snack Consumption for the past 24 hrs:   Oral Nutrition   03/03/20 1857 Dinner;Less than 25% Consumed   03/03/20 0900 Breakfast;Between 50-75% Consumed     Appetite:  Fair  Admit Weight:  Weight: 75 kg (165 lb 5.5 oz)  Weight Last 7 Days   [75 kg (165 lb 5.5 oz)-77.5 kg (170 lb 13.7 oz)] 77.5 kg (170 lb 13.7 oz) (03/01 0845)    Pain Issues:    Location:  --  --         Severity:  Denies   Scheduled pain medications:  None     PRN pain medications used in last 24 hours:  None   Non Pharmacologic Interventions:  distraction, emotional support, relaxation, repositioned and rest  Effectiveness of pain management plan:  good=patient states acceptable comfort after interventions    Bowel:    Bowel Assist Initial Score:  1 - Total Assistance  Bowel Assist Current Score:  5 - Standby Prompting/Supervision or Set-up  Bowl Accidents in last 7 days:  0  Last bowel movement: 03/03/20  Stool Description: Small, Loose     Usual bowel pattern:  daily  Scheduled bowel medications:  senna-docusate (PERICOLACE or SENOKOT S)   PRN bowel medications used in last 24 hours:  None  Nursing Interventions:  Increased time, Scheduled medication, Set-up, Verbal cueing  Effectiveness of bowel program:  Fair - having loose BMs    Bladder:    Bladder Assist Initial Score:  1 - Total Assistance  Bladder Assist Current Score:  5 - Standby Prompting/Supervision or Set-up  Bladder Accidents in last 7 days:  5  PVR  range for past 24-48 hours:  []   Intermittent Catheterization:  no  Medications affecting bladder:  None    Time void schedule/voiding pattern:  Voiding every 2-4 hours  Interventions:  Increased time, Supervision, Verbal cueing, Time void patient initiated  Effectiveness of bladder training:  Fair=occasional unpredictable, incontinent emptying of bladder (< 1 time/day)    Sleep/wake cycle:   Average hours slept:  Sleeps 3-4 hours without waking  Sleep medication usage:  None    Patient/Family Training/Education:  Bladder Management/Training, Bowel Management/Training, Fall Prevention, General Self Care, Safe Transfers and Safety  Discharge Supply Recommendations:  Blood Pressure Monitor  Strengths: Manages pain appropriately   Barriers:   Bladder incontinence, Fatigue and Limited mobility       Nursing Problems     Problem: Bowel/Gastric:     Description:     Goal: Normal bowel function is maintained or improved     Description:           Goal: Will not experience complications related to bowel motility     Description:                 Problem: Communication     Description:     Goal: The ability to communicate needs accurately and effectively will improve     Description:                 Problem: Discharge Barriers/Planning     Description:     Goal: Patient's continuum of care needs will be met     Description:                 Problem: Infection     Description:     Goal: Will remain free from infection     Description:                 Problem: Knowledge Deficit     Description:     Goal: Knowledge of disease process/condition, treatment plan, diagnostic tests, and medications will improve     Description:           Goal: Knowledge of the prescribed therapeutic regimen will improve     Description:                 Problem: Pain Management     Description:     Goal: Pain level will decrease to patient's comfort goal     Description:                 Problem: Psychosocial Needs:     Description:     Goal:  Level of anxiety will decrease     Description:                 Problem: Respiratory:     Description:     Goal: Respiratory status will improve     Description:                 Problem: Safety     Description:     Goal: Will remain free from injury     Description:           Goal: Will remain free from falls     Description:                 Problem: Skin Integrity     Description:     Goal: Risk for impaired skin integrity will decrease     Description:                 Problem: Urinary Elimination:     Description:     Goal: Ability to reestablish a normal urinary elimination pattern will improve     Description:                 Problem: Venous Thromboembolism (VTW)/Deep Vein Thrombosis (DVT) Prevention:     Description:     Goal: Patient will participate in Venous Thrombosis (VTE)/Deep Vein Thrombosis (DVT)Prevention Measures     Description:                        Long Term Goals:   At discharge patient will be able to function safely at home and in the community with support.    Section completed by:  Rey Mcintosh R.N.           Mobility  Bed mobility:   SPV  Bed /Chair/Wheelchair Transfer Initial:  4 - Minimal Assistance  Bed /Chair/Wheelchair Transfer Current:  4 - Minimal Assistance   Bed/Chair/Wheelchair Transfer Description:  Increased time, Verbal cueing, Adaptive equipment  Walk Initial:  4 - Minimal Assistance  Walk Current:  4 - Minimal Assistance   Walk Description:  Walker, Safety concerns, Verbal cueing, Extra time, Requires incidental assist, Assist device/equipment(245 ft FWW, cues for FWW mangement, and for pathfinding and L attention,  min A for occasional LOB requiring assist)  Wheelchair Initial:  2 - Max Assistance  Wheelchair Current:  2 - Max Assistance   Wheelchair Description:  Safety concerns, Requires incidental assist, Supervision for safety, Verbal cueing(50 ftx1 with BUEs propelling and supervision, verbal cues for L hemianopsia; requires assist for longer distances.)  Stairs  Initial:  0 - Not tested,unsafe activity  Stairs Current: 0 - Not tested,unsafe activity   Stairs Description:    Patient/Family Training/Education:  Ongoing, focus on rehab  DME/DC Recommendations:  TBD - likely FWW, 24 hr SPV, home health therapy   Strengths:  Willingly participates in therapeutic activities  Barriers:   Confused, Decreased endurance, Generalized weakness, Limited mobility, Poor balance, Poor insight/denial of deficits and Other: severe vision impairment, Neglect L  # of short term goals set= 5  # of short term goals met=1  Physical Therapy Problems     Problem: Mobility     Dates: Start: 02/29/20       Description:     Goal: STG-Within one week, patient will ambulate household distance     Dates: Start: 02/29/20   Expected End: 03/07/20       Description: 1) Individualized goal:  150 ft with FWW and SBA.  2) Interventions:  PT Group Therapy, PT Gait Training, PT Therapeutic Exercises, PT Neuro Re-Ed/Balance, PT Therapeutic Activity and PT Evaluation       Note:     Goal Note filed on 03/03/20 1809 by Ayush Gill, PT    CGA                  Goal: STG-Within one week, patient will ambulate up/down a curb     Dates: Start: 02/29/20   Expected End: 03/07/20       Description: 1) Individualized goal:  With FWW and CGA.  2) Interventions:  PT Group Therapy, PT Gait Training, PT Therapeutic Exercises, PT Neuro Re-Ed/Balance, PT Therapeutic Activity and PT Evaluation     Note:     Goal Note filed on 03/03/20 1809 by Ayush Gill, PT    NT dt safety                  Goal: STG-Within one week, patient will ascend and descend four to six stairs     Dates: Start: 02/29/20   Expected End: 03/07/20       Description: 1) Individualized goal:  With B rails and CGA.  2) Interventions:  PT Group Therapy, PT Gait Training, PT Therapeutic Exercises, PT Neuro Re-Ed/Balance, PT Therapeutic Activity and PT Evaluation       Note:     Goal Note filed on 03/03/20 1809 by Ayush Gill, PT    NT dt  safety                        Problem: Mobility Transfers     Dates: Start: 02/29/20       Description:     Goal: STG-Within one week, patient will transfer bed to chair     Dates: Start: 02/29/20   Expected End: 03/07/20       Description: 1) Individualized goal:  With FWW and SBA.  2) Interventions: PT Group Therapy, PT Gait Training, PT Therapeutic Exercises, PT Neuro Re-Ed/Balance, PT Therapeutic Activity and PT Evaluation     Note:     Goal Note filed on 03/03/20 1808 by Ayush Gill, PT    Min A                        Problem: PT-Long Term Goals     Dates: Start: 02/29/20       Description:     Goal: LTG-By discharge, patient will ambulate     Dates: Start: 02/29/20   Expected End: 03/21/20       Description: 1) Individualized goal:  150 ft with LRAD and supervision.  2) Interventions: PT Group Therapy, PT Gait Training, PT Therapeutic Exercises, PT Neuro Re-Ed/Balance, PT Therapeutic Activity and PT Evaluation             Goal: LTG-By discharge, patient will transfer one surface to another     Dates: Start: 02/29/20   Expected End: 03/21/20       Description: 1) Individualized goal:  With LRAD mod I.  2) Interventions:  PT Group Therapy, PT Gait Training, PT Therapeutic Exercises, PT Neuro Re-Ed/Balance, PT Therapeutic Activity and PT Evaluation             Goal: LTG-By discharge, patient will ambulate up/down 4-6 stairs     Dates: Start: 02/29/20   Expected End: 03/21/20       Description: 1) Individualized goal:  With B rails and supervision.  2) Interventions:  PT Group Therapy, PT Gait Training, PT Therapeutic Exercises, PT Neuro Re-Ed/Balance, PT Therapeutic Activity and PT Evaluation             Goal: LTG-By discharge, patient will transfer in/out of a car     Dates: Start: 02/29/20   Expected End: 03/21/20       Description: 1) Individualized goal:  With LRAD and supervision.  2) Interventions:  PT Group Therapy, PT Gait Training, PT Therapeutic Exercises, PT Neuro Re-Ed/Balance, PT Therapeutic  Activity and PT Evaluation           Goal: LTG-By discharge, patient will     Dates: Start: 02/29/20   Expected End: 03/21/20       Description: 1) Individualized goal:  Perform bed mobility with no adaptations independently.  2) Interventions: PT Group Therapy, PT Gait Training, PT Therapeutic Exercises, PT Neuro Re-Ed/Balance, PT Therapeutic Activity and PT Evaluation             Goal: LTG-By discharge, patient will     Dates: Start: 02/29/20   Expected End: 03/21/20       Description: 1) Individualized goal:  Navigate up/down curb with LRAD and supervision.  2) Interventions:  PT Group Therapy, PT Gait Training, PT Therapeutic Exercises, PT Neuro Re-Ed/Balance, PT Therapeutic Activity and PT Evaluation                           Section completed by:  Ayush Gill, PT    Activities of Daily Living  Eating Initial:  2 - Max Assistance  Eating Current:  5 - Standby Prompting/Supervision or Set-up   Eating Description:  Modified diet, Supervision for safety, Verbal cueing, Set-up of equipment or meal/tube feeding, Increased time(Assistance with opening containers)  Grooming Initial:  5 - Standby Prompting/Supervision or Set-up  Grooming Current:  6 - Modified Independent   Grooming Description:  (Seated shaving task with electric razor)  Bathing Initial:  4 - Minimal Assistance  Bathing Current:  4 - Minimal Assistance   Bathing Description:  Grab bar, Increased time, Supervision for safety, Verbal cueing, Hand held shower, Tub bench(Steady assist during standing pursuits, multiple vqs to minimize standing in shower due to fair- standing balance/L visual field cut)  Upper Body Dressing Initial:  5 - Standby Prompting/Supervision or Set-up  Upper Body Dressing Current:  5 - Standby Prompting/Supervision or Set-up   Upper Body Dressing Description:  Verbal cueing, Supervision for safety, Increased time(Multimodal cues for LUE long-sleeved shirt threading )  Lower Body Dressing Initial:  3 - Moderate  Assistance  Lower Body Dressing Current:  4 - Minimal Assistance   Lower Body Dressing Description:  4 - Minimal Assistance  Toileting Initial:  4 - Minimal Assistance  Toileting Current:  5 - Standby Prompting/Supervision or Set-up   Toileting Description:  Supervision for safety, Verbal cueing, Grab bar(Close SBA during standing urination due to patient positioning body right of toilet by ~45 degrees with incidental RUE support )  Toilet Transfer Initial:  4 - Minimal Assistance  Toilet Transfer Current:  4 - Minimal Assistance   Toilet Transfer Description:  4 - Minimal Assistance  Tub / Shower Transfer Initial:  4 - Minimal Assistance  Tub / Shower Transfer Current:  4 - Minimal Assistance   Tub / Shower Transfer Description:  Grab bar, Increased time, Supervision for safety(CGA SPT with verbal cues to utilize tub bench during showering task)  IADL:  TBD.    Family Training/Education:  Ongoing with pt, no formal FT completed thus far; CVA recovery, rehab goals and expectations, safety with ADLs and bathroom transfers, compensatory strategies for visual deficits.    DME/DC Recommendations:  TBD; likely 24 hr spv and home health OT; need to verify bathroom set up and equipment with pt's family.      Strengths:  Pleasant and cooperative and Willingly participates in therapeutic activities  Barriers:  Confused, Decreased endurance, Generalized weakness, Impulsive, Limited mobility, Poor activity tolerance, Poor balance, Poor carryover of learning, Poor insight/denial of deficits and Other: severe visual impairments, left neglect     # of short term goals set= 3    # of short term goals met= 1     Occupational Therapy Goals     Problem: Bathing     Dates: Start: 03/04/20       Description:     Goal: STG-Within one week, patient will bathe     Dates: Start: 03/04/20       Description: 1) Individualized Goal: With supervision and verbal cues,AE/ DME prn.  2) Interventions: OT Orthotics Training, OT E Stim Attended, OT  Group Therapy, OT Self Care/ADL, OT Cognitive Skill Dev, OT Community Reintegration, OT Manual Ther Technique, OT Neuro Re-Ed/Balance, OT Sensory Int Techniques, OT Therapeutic Activity, OT Ultrasound, OT Evaluation and OT Therapeutic Exercise                    Problem: Dressing     Dates: Start: 02/29/20       Description:     Goal: STG-Within one week, patient will dress LB     Dates: Start: 02/29/20   Expected End: 03/07/20       Description: 1) Individualized Goal:  With supervision and verbal cues  2) Interventions:  OT Orthotics Training, OT E Stim Attended, OT Group Therapy, OT Self Care/ADL, OT Cognitive Skill Dev, OT Community Reintegration, OT Manual Ther Technique, OT Neuro Re-Ed/Balance, OT Sensory Int Techniques, OT Therapeutic Activity, OT Ultrasound, OT Evaluation and OT Therapeutic Exercise     Note:     Goal Note filed on 03/04/20 1134 by Tiana Kirby OT    Min A                          Problem: Functional Transfers     Dates: Start: 02/29/20       Description:     Goal: STG-Within one week, patient will transfer to toilet     Dates: Start: 02/29/20   Expected End: 03/07/20       Description: 1) Individualized Goal:  With supervision and verbal cues  2) Interventions:  OT Orthotics Training, OT E Stim Attended, OT Group Therapy, OT Self Care/ADL, OT Cognitive Skill Dev, OT Community Reintegration, OT Manual Ther Technique, OT Neuro Re-Ed/Balance, OT Sensory Int Techniques, OT Therapeutic Activity, OT Ultrasound, OT Evaluation and OT Therapeutic Exercise     Note:     Goal Note filed on 03/04/20 1134 by Tiana Kirby, OT    CGA-Min A.                        Problem: OT Long Term Goals     Dates: Start: 02/29/20       Description:     Goal: LTG-By discharge, patient will complete basic self care tasks     Dates: Start: 02/29/20   Expected End: 03/21/20       Description: 1) Individualized Goal:  With mod I  2) Interventions:  OT Orthotics Training, OT E Stim Attended, OT Group Therapy, OT  Self Care/ADL, OT Cognitive Skill Dev, OT Community Reintegration, OT Manual Ther Technique, OT Neuro Re-Ed/Balance, OT Sensory Int Techniques, OT Therapeutic Activity, OT Ultrasound, OT Evaluation and OT Therapeutic Exercise           Goal: LTG-By discharge, patient will complete basic home management     Dates: Start: 02/29/20   Expected End: 03/21/20       Description: 1) Individualized Goal:  With min A  2) Interventions:  OT Orthotics Training, OT E Stim Attended, OT Group Therapy, OT Self Care/ADL, OT Cognitive Skill Dev, OT Community Reintegration, OT Manual Ther Technique, OT Neuro Re-Ed/Balance, OT Sensory Int Techniques, OT Therapeutic Activity, OT Ultrasound, OT Evaluation and OT Therapeutic Exercise                       Section completed by:  Tiana Kirby, OT    Cognitive Linquistic Functions  Comprehension Initial:  5 - Stand-by Prompting/Supervision or Set-up  Comprehension Current:  5 - Stand-by Prompting/Supervision or Set-up   Comprehension Description:  Glasses, Verbal cues  Expression Initial:  5 - Stand-by Prompting/Supervision or Set-up  Expression Current:  5 - Stand-by Prompting/Supervision or Set-up   Expression Description:  Verbal cueing  Social Interaction Initial:  5 - Stand-by Prompting/Supervision or Set-up  Social Interaction Current:  5 - Stand-by Prompting/Supervision or Set-up   Social Interaction Description:  Increased time, Verbal cues  Problem Solving Initial:  4 - Minimal Assistance  Problem Solving Current:  4 - Minimal Assistance   Problem Solving Description:  Verbal cueing, Bed/chair alarm, Increased time, Therapy schedule  Memory Initial:  2 - Max Assistance  Memory Current:  2 - Max Assistance   Memory Description:  Verbal cueing, Bed/chair alarm, Seat belt, Therapy schedule  Executive Functioning / Organization Initial:     Executive Functioning / Organization Current:      Executive Functioning Desciption:  TBD  Swallowing  Swallowing Status:  Patient does not  currently receive dysphagia intervention.  Orders Placed This Encounter   Procedures   • Diet Order Regular     Standing Status:   Standing     Number of Occurrences:   1     Order Specific Question:   Diet:     Answer:   Regular [1]     Behavior Modification Plan  Keep instructions simple/concrete, Give clear feedback, Set clear goals, Redirect to task/topic and Analyze tasks (break down into smaller steps)  Assistive Technology  Low tech: Calendar, planner, schedule, alarms/timers, pill organizer, post-it notes, lists  Family Training/Education:  To be scheduled.  DC Recommendations:   Anticipate patient will benefit from additional ST services upon discharge from this facility.  Strengths:  Making steady progress towards goals, Pleasant and cooperative, Supportive family and Willingly participates in therapeutic activities  Barriers:  Confused, Poor carryover of learning, Poor insight/denial of deficits and Other: visual inattention./neglect  # of short term goals set=  3  # of short term goals met= 1  Speech Therapy Problems     Problem: Memory STGs     Dates: Start: 02/29/20       Description:     Goal: STG-Within one week, patient will     Dates: Start: 02/29/20       Description: 1) Individualized goal:  Utilize external memory aids for orientation to date and daily therapy activities with 80% acc and MOD A.   2) Interventions:  SLP Self Care / ADL Training , SLP Cognitive Skill Development and SLP Group Treatment       Note:     Goal Note filed on 03/04/20 9134 by Tia Vallecillo MS,CCC-SLP    O- log improved to 16/20 yesterday, compared to 12/20 in session previous to that.                        Problem: Problem Solving STGs     Dates: Start: 02/29/20       Description:     Goal: STG-Within one week, patient will     Dates: Start: 02/29/20       Description: 1) Individualized goal:  Complete basic single target scanning activities with 80% accuracy and MIN A.   2) Interventions:  SLP Self Care / ADL  Training , SLP Cognitive Skill Development and SLP Group Treatment       Note:     Goal Note filed on 03/04/20 0754 by Tia Vallecillo MS,CCC-SLP    To be addressed.                        Problem: Speech/Swallowing LTGs     Dates: Start: 02/29/20       Description:     Goal: LTG-By discharge, patient will     Dates: Start: 02/29/20       Description: 1) Individualized goal:  Solve basic to moderate problems related to health and safety using external memory aids when needed with 80% accuracy and MIN A.   2) Interventions:  SLP Self Care / ADL Training  and SLP Cognitive Skill Development, Group Treatment                          Section completed by:  Tia Vallecillo MS,CCC-SLP    Recreation/Community          Strengths:  Pleasant and cooperative and Willingly participates in therapeutic activities  Barriers:  Confused, Decreased endurance, Generalized weakness, Impulsive, Limited mobility, Poor activity tolerance, Poor balance and Lack of motivation      Pt was evaluated just prior to conference. Evaluation note to follow. Pt is reported to be impulsive by CNA. He reports that he would like to work on his walking. Mod A transfer from bed to chair. Pt is easily distracted and required Mod verbal cues. Reported reading as his only leisure activity that he enjoyed.     Recreation Therapy Problems (Active)      There are no active problems.                Section completed by:  JUDY LaoS    Nutrition  Dietary Problems     Problem: Inadequate nutrient intake     Description:     Goal: Patient to consume greater than or equal to 50% of meals     Description:                     Nutrition Care/ Consult For Poor PO     Assessment:    Admitting Diagnosis: Cerebrovascular accident (CVA) due to thrombosis of right posterior cerebral artery (HCC)  Pertinent PMH: immune thrombocytopenia, TIAs, carotid artery stenosis, hypertension, cataracts, gout, HTN    Additional Information: Patient seen in room with wife  "present.  He is slow to answer but appears alert and oriented.  Patient states his appetite is good.  He states he is eating \"almost all\" of his trays (documentation by staff I chart is conflicting).  His wife states she is not here at meals but the patient tells her the same thing.  Patient reports usual intake of 2 meals per day.  Patient denies weight changes.  He denies issues chewing or swallowing now that he has his teeth.  Reports some meats are difficult to chew and ordering appropriate textures.  No GI/ issues.  Can self feed.  Does like to snack on candy at home (per wife) and she plans to bring some in for him.    Sent nutrition rep to round back with patient regarding menus.     Appetite: Good per patient   Diet: Regular   Average PO intake x 3 days: ~40% of meals     Labs: WBC 4.2, Platelets 153, K+ 3.4, Ca 8.4, T.P. 5.5  Medications: noted  PRN Medications: noted/ s/p 40mEq KCl  IVF: n/a     Height: 195.1cm   Weight: 77.5kg   BMI: 20.37- underweight for age with BMI <23 age > 65    Skin: CDI  GI: small, loose stool 3/3   : yellow UOP  Vitals: BP 10/89 this AM  I/Os: +720 mL x 24 hours     Nutrient Needs:  Kcal: 2511-7360 kcals/day BEE=1585  Protein: 78- 93 g/day   Fluid: 2000-2400mL/day       Malnutrition risk: no risk identified     Diagnosis: Inadequate oral intake r/t questionable fair appetite in setting of recent hospitalization with altered cognition/ memory as evidenced by patient with intake <50% of nutrient needs/ differences in information provided to various providers when asked same questions.     Intervention/ Recommendations/POC:  1. Encourage wife to bring in candy.  Regular diet.  Will observe, provide interventions if PO does not improve.    2.Encourage adequate PO/fluid intake.  3. Nutrition rep to see regarding food prefs/ honor within dietary restrictions (if indicated)     Monitor/Evaluation: Monitor PO intake, weight, labs, medication adjustments, skin integrity, GI function, " vitals, I/Os, and overall hydration status.  Adjust nutritional POC pending clinical outcomes.    RD following weekly.   Goal: Maintain >/=50% oral nutrient/fluid intake to promote nutrition optimization/healing.         Section completed by:  Maria G Davies R.D.    REHAB-Pharmacy IDT Team Note by Ofelia Bowman RPH at 3/3/2020 11:52 AM  Version 1 of 1    Author:  Ofelia Bowman RPH Service:  -- Author Type:  Pharmacist    Filed:  3/3/2020 11:54 AM Date of Service:  3/3/2020 11:52 AM Status:  Signed    :  Ofelia Bowman RPH (Pharmacist)         Pharmacy   Pharmacy  Antibiotics/Antifungals/Antivirals:  Medication:      Active Orders (From admission, onward)    None        Route:         n/a  Stop Date:  n/a  Reason:   Antihypertensives/Cardiac:  Medication:    Orders (72h ago, onward)     Start     Ordered    03/01/20 1745  carvedilol (COREG) tablet 6.25 mg  ONCE      03/01/20 1725    02/28/20 1518  hydrALAZINE (APRESOLINE) tablet 25 mg  EVERY 8 HOURS PRN      02/28/20 1520              Patient Vitals for the past 24 hrs:   BP Pulse   03/03/20 0700 135/75 91   03/02/20 1828 135/78 71   03/02/20 1430 131/71 75     Anticoagulation:  Medication:  Lovenox  INR:      Other key medications: eltrombopag, quetiapine  A review of the medication list reveals no issues at this time. Patient is currently on an antihypertensive. Recommend home blood pressure monitoring/recording if antihypertensive regimen continues.  Section completed by:  Ofelia Bowman RPH[[email or web address]]        Attribution Key     TK.1 - Ofelia Bowman RPH on 3/3/2020 11:52 AM                  DC Planning  DC destination/dispostion:  To single level home with two step entry (no rails) and two interior steps from kitchen to family room (no rails and narrow), with support of wife. Patient's brother from [place] will install needed grab bars and come from [place] when patient is discharged to help transition patient home.    DC Needs: Family  training.  Anticipate home health care.  MD f/u appointments - unknown PCP.   DME needs TBD. Patient has no DME. Wife has two canes.     Barriers to discharge:   Functional deficits. Cognitive deficits.    Strengths: PLOF - , independent. Supportive wife and brother.     Section completed by:  Monika Tellez R.N.      Physician Summary  Elba Chase MD participated and led team conference discussion.

## 2020-03-04 NOTE — CARE PLAN
Problem: Safety  Goal: Will remain free from injury  Outcome: PROGRESSING AS EXPECTED  Goal: Will remain free from falls  Outcome: PROGRESSING AS EXPECTED   Pt is impulsive, reinforced education throughout shift, chair and bed alarms in place, seat belt in place on wheelchair, will continue to reinforce and monitor.  Problem: Knowledge Deficit  Goal: Knowledge of disease process/condition, treatment plan, diagnostic tests, and medications will improve  Outcome: PROGRESSING SLOWER THAN EXPECTED   Pt shows poor understanding regarding proper hydration, and timed voids, and infection prevention. Will keep attempting to collect urine sample as per order, pt has been incont to urine throughout shift, will continue to provide care and reinforce care plan as per order.

## 2020-03-04 NOTE — REHAB-CM IDT TEAM NOTE
Case Management    DC Planning  DC destination/dispostion:  To single level home with two step entry (no rails) and two interior steps from kitchen to family room (no rails and narrow), with support of wife. Patient's brother from [place] will install needed grab bars and come from [place] when patient is discharged to help transition patient home.    DC Needs: Family training.  Anticipate home health care.  MD f/u appointments - unknown PCP.   DME needs TBD. Patient has no DME. Wife has two canes.     Barriers to discharge:   Functional deficits. Cognitive deficits.    Strengths: PLOF - , independent. Supportive wife and brother.     Section completed by:  Monika Tellez R.N.

## 2020-03-04 NOTE — REHAB-NURSING IDT TEAM NOTE
Nursing   Nursing  Diet/Nutrition:  Regular and Thin Liquids  Medication Administration:  Whole with Liquid Wash  % consumed at meals in last 24 hours:  Consumed 25-75% of meals per documentation.  Meal/Snack Consumption for the past 24 hrs:   Oral Nutrition   03/03/20 1857 Dinner;Less than 25% Consumed   03/03/20 0900 Breakfast;Between 50-75% Consumed     Appetite:  Fair  Admit Weight:  Weight: 75 kg (165 lb 5.5 oz)  Weight Last 7 Days   [75 kg (165 lb 5.5 oz)-77.5 kg (170 lb 13.7 oz)] 77.5 kg (170 lb 13.7 oz) (03/01 0845)    Pain Issues:    Location:  --  --         Severity:  Denies   Scheduled pain medications:  None     PRN pain medications used in last 24 hours:  None   Non Pharmacologic Interventions:  distraction, emotional support, relaxation, repositioned and rest  Effectiveness of pain management plan:  good=patient states acceptable comfort after interventions    Bowel:    Bowel Assist Initial Score:  1 - Total Assistance  Bowel Assist Current Score:  5 - Standby Prompting/Supervision or Set-up  Bowl Accidents in last 7 days:  0  Last bowel movement: 03/03/20  Stool Description: Small, Loose     Usual bowel pattern:  daily  Scheduled bowel medications:  senna-docusate (PERICOLACE or SENOKOT S)   PRN bowel medications used in last 24 hours:  None  Nursing Interventions:  Increased time, Scheduled medication, Set-up, Verbal cueing  Effectiveness of bowel program:  Fair - having loose BMs    Bladder:    Bladder Assist Initial Score:  1 - Total Assistance  Bladder Assist Current Score:  5 - Standby Prompting/Supervision or Set-up  Bladder Accidents in last 7 days:  5  PVR range for past 24-48 hours:  []   Intermittent Catheterization:  no  Medications affecting bladder:  None    Time void schedule/voiding pattern:  Voiding every 2-4 hours  Interventions:  Increased time, Supervision, Verbal cueing, Time void patient initiated  Effectiveness of bladder training:  Fair=occasional unpredictable,  incontinent emptying of bladder (< 1 time/day)    Sleep/wake cycle:   Average hours slept:  Sleeps 3-4 hours without waking  Sleep medication usage:  None    Patient/Family Training/Education:  Bladder Management/Training, Bowel Management/Training, Fall Prevention, General Self Care, Safe Transfers and Safety  Discharge Supply Recommendations:  Blood Pressure Monitor  Strengths: Manages pain appropriately   Barriers:   Bladder incontinence, Fatigue and Limited mobility       Nursing Problems     Problem: Bowel/Gastric:     Description:     Goal: Normal bowel function is maintained or improved     Description:           Goal: Will not experience complications related to bowel motility     Description:                 Problem: Communication     Description:     Goal: The ability to communicate needs accurately and effectively will improve     Description:                 Problem: Discharge Barriers/Planning     Description:     Goal: Patient's continuum of care needs will be met     Description:                 Problem: Infection     Description:     Goal: Will remain free from infection     Description:                 Problem: Knowledge Deficit     Description:     Goal: Knowledge of disease process/condition, treatment plan, diagnostic tests, and medications will improve     Description:           Goal: Knowledge of the prescribed therapeutic regimen will improve     Description:                 Problem: Pain Management     Description:     Goal: Pain level will decrease to patient's comfort goal     Description:                 Problem: Psychosocial Needs:     Description:     Goal: Level of anxiety will decrease     Description:                 Problem: Respiratory:     Description:     Goal: Respiratory status will improve     Description:                 Problem: Safety     Description:     Goal: Will remain free from injury     Description:           Goal: Will remain free from falls     Description:                  Problem: Skin Integrity     Description:     Goal: Risk for impaired skin integrity will decrease     Description:                 Problem: Urinary Elimination:     Description:     Goal: Ability to reestablish a normal urinary elimination pattern will improve     Description:                 Problem: Venous Thromboembolism (VTW)/Deep Vein Thrombosis (DVT) Prevention:     Description:     Goal: Patient will participate in Venous Thrombosis (VTE)/Deep Vein Thrombosis (DVT)Prevention Measures     Description:                        Long Term Goals:   At discharge patient will be able to function safely at home and in the community with support.    Section completed by:  Rey Mcintosh R.N.

## 2020-03-05 PROCEDURE — 700102 HCHG RX REV CODE 250 W/ 637 OVERRIDE(OP): Performed by: PHYSICAL MEDICINE & REHABILITATION

## 2020-03-05 PROCEDURE — 97535 SELF CARE MNGMENT TRAINING: CPT

## 2020-03-05 PROCEDURE — 97530 THERAPEUTIC ACTIVITIES: CPT

## 2020-03-05 PROCEDURE — 97110 THERAPEUTIC EXERCISES: CPT

## 2020-03-05 PROCEDURE — 97112 NEUROMUSCULAR REEDUCATION: CPT

## 2020-03-05 PROCEDURE — 97130 THER IVNTJ EA ADDL 15 MIN: CPT

## 2020-03-05 PROCEDURE — 99231 SBSQ HOSP IP/OBS SF/LOW 25: CPT | Performed by: PHYSICAL MEDICINE & REHABILITATION

## 2020-03-05 PROCEDURE — 97116 GAIT TRAINING THERAPY: CPT

## 2020-03-05 PROCEDURE — A9270 NON-COVERED ITEM OR SERVICE: HCPCS | Performed by: PHYSICAL MEDICINE & REHABILITATION

## 2020-03-05 PROCEDURE — 770010 HCHG ROOM/CARE - REHAB SEMI PRIVAT*

## 2020-03-05 PROCEDURE — 97129 THER IVNTJ 1ST 15 MIN: CPT

## 2020-03-05 RX ADMIN — TRAZODONE HYDROCHLORIDE 50 MG: 50 TABLET ORAL at 20:54

## 2020-03-05 RX ADMIN — SENNOSIDES AND DOCUSATE SODIUM 2 TABLET: 8.6; 5 TABLET ORAL at 08:48

## 2020-03-05 RX ADMIN — ALLOPURINOL 300 MG: 300 TABLET ORAL at 08:48

## 2020-03-05 RX ADMIN — ASPIRIN 325 MG: 325 TABLET, COATED ORAL at 08:48

## 2020-03-05 RX ADMIN — MELATONIN 2000 UNITS: at 08:48

## 2020-03-05 RX ADMIN — SENNOSIDES AND DOCUSATE SODIUM 2 TABLET: 8.6; 5 TABLET ORAL at 20:54

## 2020-03-05 NOTE — THERAPY
"Occupational Therapy  Daily Treatment     Patient Name: Chilo Cabrera  Age:  84 y.o., Sex:  male  Medical Record #: [id number]  Today's Date: 3/5/2020     Precautions  Precautions: (P) Fall Risk  Comments: (P) left hemianopsia    Safety   ADL Safety : Impaired, Impulsive, Requires Supervision for Safety, Requires Cueing for Safety  Bathroom Safety: Impaired, Impulsive, Requires Supervision for Safety, Requires Cuing for Safety  Comments: see FIMs for toileting and toilet transfer.    Subjective    \"I'm ready to go home\"     Objective       03/05/20 1231   Precautions   Precautions Fall Risk   Comments left hemianopsia   Sitting Lower Body Exercises   Sit to Stand 2 sets of 10  (intermittent use of UB support for balance.)   Nustep Resistance Level 6  (for general endurance training 20 min, 0 RB, cues for pacing)   Interdisciplinary Plan of Care Collaboration   Patient Position at End of Therapy Seated;Chair Alarm On;Self Releasing Lap Belt Applied;Other (Comments)  (transfer of care to speech therapy)   OT Total Time Spent   OT Individual Total Time Spent (Mins) 30   OT Charge Group   OT Therapeutic Exercise  2         Assessment    Pt tolerated session well and making progress with overall endurance and balance. Pt verbalizes that he feels ready to d/c home and reports that his wife will be able to provide assistance. Barriers con't to be impaired cog/safety, impulsivity, impaired balance and visual deficits.      Plan    Cont'd OT for neuro re-education, vision, balance, strength, endurance, education for safe ADL's and transfers       "

## 2020-03-05 NOTE — THERAPY
"Recreational Therapy  Daily Treatment     Patient Name: Chilo Cabrera  AGE:  84 y.o., SEX:  male  Medical Record #: [id number]  Today's Date: 3/5/2020       Subjective    \"so so\" when asked how his day was going.      Objective       03/05/20 0831   Functional Ability Status - Cognitive   Attention Span Remains on Task with Cueing   Comprehension Follows One Step Commands   Judgment Able to Make Independent Decisions;Needs Assistance   Functional Ability Status - Emotional    Affect Flat;Appropriate   Mood Appropriate   Behavior Appropriate   Skilled Intervention    Skilled Intervention Cognitive Leisure   Skilled Intervention Comments object identifying on his left side.    Interdisciplinary Plan of Care Collaboration   IDT Collaboration with  Nursing   Patient Position at End of Therapy Seated;Tray Table within Reach;Call Light within Reach   Collaboration Comments giving medication   Treatment Time   Total Time Spent (mins) 15   Total Time Missed 15   Reasons for Time Missed Medical-Patient With Nursing  (finishing breakfast. Education provided. )   Procedural Tracking   Procedural Tracking Cognitive Skills Training       Assessment    Pt remained in the cafeteria finishing his breakfast as well as receiving medication from his nurse. He was prompted Max verbal to look to his left to find his schedule. Needed to be told to move his head toward the sound of this writer's voice in order to find a read his schedule.      Plan    Line bisection activity.   "

## 2020-03-05 NOTE — THERAPY
Occupational Therapy  Daily Treatment     Patient Name: Chilo Cabrera  Age:  84 y.o., Sex:  male  Medical Record #: [id number]  Today's Date: 3/5/2020     Precautions  Precautions: Fall Risk  Comments: left hemianopsia    Safety   ADL Safety : Impaired, Impulsive, Requires Supervision for Safety, Requires Cueing for Safety  Bathroom Safety: Impaired, Impulsive, Requires Supervision for Safety, Requires Cuing for Safety  Comments: see FIMs for toileting and toilet transfer.    Subjective    Pt received at nurse's station. Agreeable to OT session.     Objective       20 0931   Precautions   Precautions Fall Risk   Comments left hemianopsia   Safety    ADL Safety  Impaired;Impulsive;Requires Supervision for Safety;Requires Cueing for Safety   Bathroom Safety Impaired;Impulsive;Requires Supervision for Safety;Requires Cuing for Safety   Comments see FIMs for toileting and toilet transfer.   Interdisciplinary Plan of Care Collaboration   IDT Collaboration with  Physician   Patient Position at End of Therapy Seated;Chair Alarm On;Self Releasing Lap Belt Applied;Other (Comments)  (seated at nurses station d/t impulsivity)   Collaboration Comments CLOF, POC   OT Total Time Spent   OT Individual Total Time Spent (Mins) 30   OT Charge Group   OT Self Care / ADL 1   OT Therapy Activity 1       FIM Toiletin - Standby Prompting/Supervision or Set-up  Toileting Description:  Grab bar, Increased time, Supervision for safety, Verbal cueing, Set-up of equipment    FIM Toilet Transfer Score:  4 - Minimal Assistance  Toilet Transfer Description:  Grab bar, Increased time, Supervision for safety, Verbal cueing, Set-up of equipment(CGA, GB and cues for safety.)    Standing in // bars, ring toss activity with rings placed on pt's left side for visual scanning, balance and coordination. Pt completed 2 rounds of 15 reps with SBA and mod cues for head turns to left side.       Assessment    Pt demonstrates increased alertness  and improved visual scanning/head turning to left side this session. Pt con't to demonstrate lack on insight into deficits from his stroke.     Plan    Cont'd OT for neuro re-education, vision, balance, strength, endurance, education for safe ADL's and transfers

## 2020-03-05 NOTE — THERAPY
"Speech Language Pathology  Daily Treatment     Patient Name: Chilo Cabrera  Age:  84 y.o., Sex:  male  Medical Record #: [id number]  Today's Date: 3/5/2020     Subjective    \"I want to go home tomorrow please\"     Objective       03/05/20 1303   SLP Total Time Spent   SLP Individual Total Time Spent (Mins) 30   Charge Group   SLP Cognitive Skill Development First 15 Minutes 1   SLP Cognitive Skill Development Additional 15 Minutes 1       Assessment    Update provided re: current DC date with further therapy to be completed prior to returning home, pt agreed. Pt completed attention task with 2 elements with 92% accuracy indep given set up assistance. Once task increased to 4 elements accuracy decreased to 64% accuracy and mod-max a for self correction.     Plan    Simple attention, orientation, and visual scanning   "

## 2020-03-05 NOTE — THERAPY
"Recreational Therapy   Initial Evaluation     Patient Name: Chilo Cabrera  Age:  84 y.o., Sex:  male  Medical Record #: [id number]  Today's Date: 3/5/2020     Subjective    \"None of your business\" when asked what he likes do do in his free time.      Objective       03/04/20 1231   Functional Ability Status - Cognitive   Attention Span Remains on Task with Cueing   Comprehension Follows Three Step Commands;Follows One Step Commands   Judgment Needs Assistance;Confused;Able to Make Independent Decisions   Functional Ability Status - Emotional    Affect Appropriate   Mood Appropriate   Behavior Appropriate   Leisure Competence Measure   Leisure Awareness Moderate Assist   Leisure Attitude Moderate Assist   Leisure Skills Moderate Assist   Cultural / Social Behaviors Modified Independent   Community Integration Skills Moderate Assist   Community Participation Moderate Assist   Interdisciplinary Plan of Care Collaboration   IDT Collaboration with  Certified Nursing Assistant   Patient Position at End of Therapy Seated   Collaboration Comments Pt taken to the nursing station and care transferred to [facility].    Treatment Time   Total Time Spent (mins) 30   Procedural Tracking   Procedural Tracking Community Re-Integration;Leisure Skills Awareness;Cognitive Skills Training       Assessment  Patient is 84 y.o. male with a diagnosis of Cerebrovascular accident (CVA) due to thrombosis of right posterior cerebral artery.  Additional factors influencing patient status / progress (ie: cognitive factors, co-morbidities, social support, etc):  immune thrombocytopenia, TIAs, carotid artery stenosis, hypertension, cataracts.      Easily distracted during session. Will try to take Pt to the low stim gym to reduce distraction for the following session.     Plan  Recommend Recreational Therapy 30-60 minutes per day 3-4 days per week for 2 weeks for the following treatments:  Community Re-Integration, Community Skills Development, Leisure " Skills Awareness, Leisure Skills Development, Cognitive Skills Training, Gross Motor Functional Leisure Skills and Group Treatment    Goals:  Long term and short term goals have been discussed with patient and they are in agreement.    Recreation Therapy Problems     Problem: Recreation Therapy     Dates: Start: 03/04/20       Description:     Goal: STG-Within one week, patient will demonstrate leisure problem solving     Dates: Start: 03/04/20       Description: By sharing on two leisure activities he would like to participate in either during sessions or during his free time and any perceived barriers to his participation.            Goal: STG-Within one week, patient will     Dates: Start: 03/04/20       Description: Complete a cognitive leisure activity with 60% accuracy with Mod verbal cues.            Goal: LTG-By discharge, patient will demonstrate leisure problem solving     Dates: Start: 03/04/20       Description: By sharing on two leisure activities he would like to participate in following discharge and any perceived barriers to his participation.            Goal: LTG-By discharge, patient will     Dates: Start: 03/04/20       Description: Complete a cognitive leisure activity with 80% accuracy with Min verbal cues.

## 2020-03-05 NOTE — THERAPY
Speech Language Pathology  Daily Treatment     Patient Name: Chilo Cabrera  Age:  84 y.o., Sex:  male  Medical Record #: [id number]  Today's Date: 3/5/2020     Subjective    Pt pleasant and cooperative.     Objective       03/05/20 1103   SLP Total Time Spent   SLP Individual Total Time Spent (Mins) 30   Charge Group   SLP Cognitive Skill Development First 15 Minutes 1   SLP Cognitive Skill Development Additional 15 Minutes 1       Assessment    O-log completed on  This date with pt achieving a score of 17/30. Pt presented with visual scanning single target, pt completing with 80% accuracy indep, increased to 100% provided MOD cues to attend to targets located on the left side. Per pt report no new visual deficits noted. MAX cues for education re: stroke and left neglect.     Plan    Simple attention, scanning and orientation

## 2020-03-05 NOTE — THERAPY
Physical Therapy   Daily Treatment     Patient Name: Chilo Cabrera  Age:  84 y.o., Sex:  male  Medical Record #: [id number]  Today's Date: 3/5/2020     Precautions  Precautions: Fall Risk  Comments: left hemianopsia    Subjective    Pt reports he knows he can find all 20 cones in scavenger hunt activity      Objective       03/05/20 1331   Bed Mobility    Sit to Stand Supervised  (repeated practice with FWW and cues for sequencing )   Interdisciplinary Plan of Care Collaboration   Patient Position at End of Therapy Seated;Self Releasing Lap Belt Applied;Chair Alarm On  (with nursing)   PT Total Time Spent   PT Individual Total Time Spent (Mins) 60   PT Charge Group   PT Gait Training 2   PT Neuromuscular Re-Education / Balance 1   PT Therapeutic Activities 1       FIM Walking Score:  4 - Minimal Assistance  Walking Description:  Walker, Safety concerns, Verbal cueing, Extra time, Requires incidental assist(250 ft, FWW, min A for steadying during scavenger hunt activity)    FIM Stairs Score:  4 - Minimal Assistance  Stairs Description:  Ascends/descends 12 to 14 steps, Requires incidental assist, Hand rails, Extra time, Safety concerns, Verbal cueing    Scavenger hunt - 20 cones set out, 10 on each side, 1st pass around gym - pt able to find 11, 4 on L, and 7 on R. 2nd pass able to find all but 4, requiring cues to find last 4. Min A for steadying throughout.     Assessment    Pt demos continued visual deficits, though does seem improved over the last few days. Continues to require cues to avoid running into objects especially on L. Min A for mobility throughout session with FWW and HRs on stairs .    Plan    Walk FWW, Vanessa, Vector no AD, lite gait for increased speed, dynamic balance challenges, navigating tight spaces/home environment with FWW, L attention and LLE forced use, family training as able,

## 2020-03-05 NOTE — CARE PLAN
Problem: Safety  Goal: Will remain free from injury  Outcome: PROGRESSING AS EXPECTED  Goal: Will remain free from falls  Outcome: PROGRESSING AS EXPECTED   Pt is impulsive, reinforced education throughout shift, chair and bed alarms in place, seat belt in place on wheelchair, will continue to reinforce and monitor.  Problem: Knowledge Deficit  Goal: Knowledge of disease process/condition, treatment plan, diagnostic tests, and medications will improve  Outcome: PROGRESSING SLOWER THAN EXPECTED   Pt shows poor understanding regarding proper hydration, and timed voids, and infection prevention. pt has been incont to urine throughout shift, will continue to provide care and reinforce care plan as per order.

## 2020-03-05 NOTE — CARE PLAN
Problem: Safety  Goal: Will remain free from falls  Intervention: Implement fall precautions  Note: Use of call light encouraged to make needs known.Fall precautions and frequent rounding in place for safety.Call light within reach.Will continue to monitor and assess needs and safety.     Problem: Pain Management  Goal: Pain level will decrease to patient's comfort goal  Note: Pt denies any pain or discomfort and no sign of acute distress noted.Repositioned with pillows for comfort.Will continue to monitor and assess pian level and medicate as needed.

## 2020-03-05 NOTE — PROGRESS NOTES
"Rehab Progress Note     Date of Service: 3/5/2020  Chief Complaint: follow up stroke    Interval Events (Subjective)    Patient seen and examined today in the therapy gym.  He is working in the parallel bars.  He currently denies any pain.  He is much more energetic today per therapy staff.  It does look like he slept better last night.  Patient just wants to know when he go home as he misses his wife.  He has no other complaints.    Objective:  VITAL SIGNS: /74   Pulse 72   Temp 36.5 °C (97.7 °F) (Oral)   Resp 18   Ht 1.951 m (6' 4.81\")   Wt 77.5 kg (170 lb 13.7 oz)   SpO2 91%   BMI 20.36 kg/m²   Gen: alert, no apparent distress  CV: regular rate and rhythm, no murmurs, no peripheral edema  Resp: clear to ascultation bilaterally, normal respiratory effort  GI: soft, non-tender abdomen, bowel sounds present  Neuro: notable for disorientation - knows that he is in the hospital but not which one, knows that he is has a stroke but does not think it has affected him and he feels normal, left visual field cut    Recent Results (from the past 72 hour(s))   URINALYSIS    Collection Time: 03/04/20 12:30 AM   Result Value Ref Range    Color DK Yellow     Character Clear     Specific Gravity 1.015 <1.035    Ph 5.0 5.0 - 8.0    Glucose Negative Negative mg/dL    Ketones Negative Negative mg/dL    Protein Negative Negative mg/dL    Bilirubin Negative Negative    Urobilinogen, Urine 0.2 Negative    Nitrite Negative Negative    Leukocyte Esterase Trace (A) Negative    Occult Blood Negative Negative    Micro Urine Req Microscopic    URINE MICROSCOPIC (W/UA)    Collection Time: 03/04/20 12:30 AM   Result Value Ref Range    WBC 0-2 (A) /hpf    RBC 0-2 (A) /hpf    Bacteria Negative None /hpf    Epithelial Cells Negative /hpf    Hyaline Cast 0-2 /lpf   CBC WITH DIFFERENTIAL    Collection Time: 03/04/20  6:19 AM   Result Value Ref Range    WBC 4.2 (L) 4.8 - 10.8 K/uL    RBC 4.05 (L) 4.70 - 6.10 M/uL    Hemoglobin 13.1 (L) " 14.0 - 18.0 g/dL    Hematocrit 37.8 (L) 42.0 - 52.0 %    MCV 93.3 81.4 - 97.8 fL    MCH 32.3 27.0 - 33.0 pg    MCHC 34.7 33.7 - 35.3 g/dL    RDW 49.1 35.9 - 50.0 fL    Platelet Count 153 (L) 164 - 446 K/uL    MPV 11.5 9.0 - 12.9 fL    Neutrophils-Polys 62.60 44.00 - 72.00 %    Lymphocytes 23.70 22.00 - 41.00 %    Monocytes 9.60 0.00 - 13.40 %    Eosinophils 2.90 0.00 - 6.90 %    Basophils 0.70 0.00 - 1.80 %    Immature Granulocytes 0.50 0.00 - 0.90 %    Nucleated RBC 0.00 /100 WBC    Neutrophils (Absolute) 2.61 1.82 - 7.42 K/uL    Lymphs (Absolute) 0.99 (L) 1.00 - 4.80 K/uL    Monos (Absolute) 0.40 0.00 - 0.85 K/uL    Eos (Absolute) 0.12 0.00 - 0.51 K/uL    Baso (Absolute) 0.03 0.00 - 0.12 K/uL    Immature Granulocytes (abs) 0.02 0.00 - 0.11 K/uL    NRBC (Absolute) 0.00 K/uL   Comp Metabolic Panel    Collection Time: 03/04/20  6:19 AM   Result Value Ref Range    Sodium 141 135 - 145 mmol/L    Potassium 3.4 (L) 3.6 - 5.5 mmol/L    Chloride 104 96 - 112 mmol/L    Co2 27 20 - 33 mmol/L    Anion Gap 10.0 0.0 - 11.9    Glucose 93 65 - 99 mg/dL    Bun 14 8 - 22 mg/dL    Creatinine 0.96 0.50 - 1.40 mg/dL    Calcium 8.4 (L) 8.5 - 10.5 mg/dL    AST(SGOT) 34 12 - 45 U/L    ALT(SGPT) 26 2 - 50 U/L    Alkaline Phosphatase 61 30 - 99 U/L    Total Bilirubin 1.3 0.1 - 1.5 mg/dL    Albumin 3.5 3.2 - 4.9 g/dL    Total Protein 5.5 (L) 6.0 - 8.2 g/dL    Globulin 2.0 1.9 - 3.5 g/dL    A-G Ratio 1.8 g/dL   MAGNESIUM    Collection Time: 03/04/20  6:19 AM   Result Value Ref Range    Magnesium 1.7 1.5 - 2.5 mg/dL   ESTIMATED GFR    Collection Time: 03/04/20  6:19 AM   Result Value Ref Range    GFR If African American >60 >60 mL/min/1.73 m 2    GFR If Non African American >60 >60 mL/min/1.73 m 2       Current Facility-Administered Medications   Medication Frequency   • polyethylene glycol/lytes (MIRALAX) PACKET 1 Packet QDAY PRN    And   • senna-docusate (PERICOLACE or SENOKOT S) 8.6-50 MG per tablet 2 Tab BID    And   • magnesium hydroxide  (MILK OF MAGNESIA) suspension 30 mL QDAY PRN    And   • bisacodyl (DULCOLAX) suppository 10 mg QDAY PRN   • QUEtiapine (SEROQUEL) tablet 25 mg Q8HRS PRN   • vitamin D (cholecalciferol) tablet 2,000 Units DAILY   • Respiratory Therapy Consult Continuous RT   • Pharmacy Consult Request ...Pain Management Review 1 Each PHARMACY TO DOSE   • acetaminophen (TYLENOL) tablet 650 mg Q4HRS PRN   • hydrALAZINE (APRESOLINE) tablet 25 mg Q8HRS PRN   • artificial tears ophthalmic solution 1 Drop PRN   • benzocaine-menthol (CEPACOL) lozenge 1 Lozenge Q2HRS PRN   • mag hydrox-al hydrox-simeth (MAALOX PLUS ES or MYLANTA DS) suspension 20 mL Q2HRS PRN   • ondansetron (ZOFRAN ODT) dispertab 4 mg 4X/DAY PRN    Or   • ondansetron (ZOFRAN) syringe/vial injection 4 mg 4X/DAY PRN   • traZODone (DESYREL) tablet 50 mg QHS PRN   • sodium chloride (OCEAN) 0.65 % nasal spray 2 Spray PRN   • hydrOXYzine HCl (ATARAX) tablet 50 mg Q6HRS PRN   • melatonin tablet 3 mg HS PRN   • lactulose 20 GM/30ML solution 30 mL QDAY PRN   • docusate sodium (ENEMEEZ) enema 283 mg QDAY PRN   • Eltrombopag Olamine TABS 50 mg DAILY   • allopurinol (ZYLOPRIM) tablet 300 mg DAILY   • aspirin EC (ECOTRIN) tablet 325 mg DAILY       Orders Placed This Encounter   Procedures   • Diet Order Regular     Standing Status:   Standing     Number of Occurrences:   1     Order Specific Question:   Diet:     Answer:   Regular [1]       Assessment:  Active Hospital Problems    Diagnosis   • *Cerebrovascular accident (CVA) due to thrombosis of right posterior cerebral artery (HCC)   • Vitamin D deficiency   • Visual field cut   • Cognitive deficits   • Essential hypertension   • History of ITP   • History of gout     This patient is a 84 y.o. male admitted for acute inpatient rehabilitation with Cerebrovascular accident (CVA) due to thrombosis of right posterior cerebral artery (HCC).    I led and attended the weekly conference, and agree with the IDT conference documentation and  plan of care as noted below.    Date of conference: 3/4/2020    Goals and barriers: See IDT note.    Biggest barriers: bladder incontinence, severe cognitive deficits, impulsive, poor insight into deficits, very impaired vision on the left    CM/social support: wife supportive, but likely can't physically assist    Anticipated DC date: 3/14/2020    Home health: PT/OT/SLP/RN    Equip: FWW    Follow up: PCP, rehab clinic      Medical Decision Making and Plan:    Right PCA stroke  Left homonymous hemianopsia  Left pronator drift  Dominant  Cognitive deficits, severe  Continue full rehab program  PT/OT/SLP, 1 hr each discipline, 5 days per week  Continue aspirin for secondary stroke prophylaxis  Statin not indicated, LDL 32    Encephalopathy  Negative UA  Order PRN Seroquel for agitation/paranoia at night, not using    History of gout  Continue allopurinol    History of Immune thrombocytopenia  Continue home medication, Eltrombopag  Monitor labs, stable on recheck    Normocytic anemia  Mild, stable    Vit D deficiency, 15  Continue supplementation    Hypokalemia  Give one dose of potassium  Recheck in several days    Bowel  Continue bowel meds  Last BM 3/3    Bladder  Check PVRs -59, 149, 50  ICP for over 400 cc  Scheduled toileting     DVT prophylaxis  Discontinue Lovenox as he is ambulating long distances    Total time:  18 minutes.  I spent greater than 50% of the time for patient care, counseling, and coordination on this date, including patient face-to face time, unit/floor time with review of records/pertinent lab data and studies, as well as discussing diagnostic evaluation/work up, planned therapeutic interventions, and future disposition of care, as per the interval events/subjective and the assessment and plan as noted above.    I have performed a physical exam, reviewed and updated ROS, as well as the assessment and plan today 3/5/2020. In review of note from 3/4/2020 there are no new changes except as  documented above.          Elba Chase M.D.   Physical Medicine and Rehabilitation

## 2020-03-05 NOTE — DISCHARGE PLANNING
Met with patient following Team Conference to relay progress, anticipated discharge date of 3/14/20. Patient asked x2 how many more days until he can go home.  I followed up with a phone call to patient's wife to discuss patient's need for 24/7 supervision, management of finances and medications for patient. Wife states she takes care of patient's brother who lives in a trailer on their 1/3 acre. She is concerned about needing to run errands. She mentioned the possibility of skilled care if she cannot manage patient's care at this time.  I will prepare information on ADC, PCA services to provide to wife when she comes tomorrow for family training. We discussed assisted living, but wife stated that don't have the money to pay for this.   Wife informed me that patient's PCP is Susan Maurer. That patient hasn't seen PCP in 4 years, but Dr. Maurer has been prescribing patient's medications. Patient has primarily seen Dr. Armenta.   Opportunity for discussion/questions provided.

## 2020-03-06 PROCEDURE — 700102 HCHG RX REV CODE 250 W/ 637 OVERRIDE(OP): Performed by: PHYSICAL MEDICINE & REHABILITATION

## 2020-03-06 PROCEDURE — 97116 GAIT TRAINING THERAPY: CPT

## 2020-03-06 PROCEDURE — 97112 NEUROMUSCULAR REEDUCATION: CPT

## 2020-03-06 PROCEDURE — A9270 NON-COVERED ITEM OR SERVICE: HCPCS | Performed by: PHYSICAL MEDICINE & REHABILITATION

## 2020-03-06 PROCEDURE — 99231 SBSQ HOSP IP/OBS SF/LOW 25: CPT | Performed by: PHYSICAL MEDICINE & REHABILITATION

## 2020-03-06 PROCEDURE — 97110 THERAPEUTIC EXERCISES: CPT

## 2020-03-06 PROCEDURE — 770010 HCHG ROOM/CARE - REHAB SEMI PRIVAT*

## 2020-03-06 PROCEDURE — 97535 SELF CARE MNGMENT TRAINING: CPT

## 2020-03-06 PROCEDURE — 97530 THERAPEUTIC ACTIVITIES: CPT

## 2020-03-06 PROCEDURE — 97130 THER IVNTJ EA ADDL 15 MIN: CPT

## 2020-03-06 PROCEDURE — 97129 THER IVNTJ 1ST 15 MIN: CPT

## 2020-03-06 RX ORDER — CHLORHEXIDINE GLUCONATE ORAL RINSE 1.2 MG/ML
15 SOLUTION DENTAL 2 TIMES DAILY
Status: DISCONTINUED | OUTPATIENT
Start: 2020-03-06 | End: 2020-03-14 | Stop reason: HOSPADM

## 2020-03-06 RX ORDER — POLYETHYLENE GLYCOL 3350 17 G/17G
1 POWDER, FOR SOLUTION ORAL
Status: DISCONTINUED | OUTPATIENT
Start: 2020-03-06 | End: 2020-03-13

## 2020-03-06 RX ORDER — AMOXICILLIN 250 MG
2 CAPSULE ORAL 2 TIMES DAILY PRN
Status: DISCONTINUED | OUTPATIENT
Start: 2020-03-06 | End: 2020-03-13

## 2020-03-06 RX ORDER — BISACODYL 10 MG
10 SUPPOSITORY, RECTAL RECTAL
Status: DISCONTINUED | OUTPATIENT
Start: 2020-03-06 | End: 2020-03-13

## 2020-03-06 RX ADMIN — MELATONIN 2000 UNITS: at 08:43

## 2020-03-06 RX ADMIN — ASPIRIN 325 MG: 325 TABLET, COATED ORAL at 08:43

## 2020-03-06 RX ADMIN — CHLORHEXIDINE GLUCONATE 0.12% ORAL RINSE 15 ML: 1.2 LIQUID ORAL at 11:39

## 2020-03-06 RX ADMIN — TRAZODONE HYDROCHLORIDE 50 MG: 50 TABLET ORAL at 23:09

## 2020-03-06 RX ADMIN — ALLOPURINOL 300 MG: 300 TABLET ORAL at 08:43

## 2020-03-06 RX ADMIN — SENNOSIDES AND DOCUSATE SODIUM 2 TABLET: 8.6; 5 TABLET ORAL at 08:43

## 2020-03-06 RX ADMIN — CHLORHEXIDINE GLUCONATE 0.12% ORAL RINSE 15 ML: 1.2 LIQUID ORAL at 19:54

## 2020-03-06 RX ADMIN — MELATONIN 3 MG: at 21:53

## 2020-03-06 NOTE — THERAPY
Occupational Therapy  Daily Treatment     Patient Name: Chilo Cabrera  Age:  84 y.o., Sex:  male  Medical Record #: [id number]  Today's Date: 3/6/2020     Precautions  Precautions: (P) Fall Risk  Comments: (P) left hemianopsia    Safety   ADL Safety : Impaired, Impulsive, Requires Supervision for Safety, Requires Cueing for Safety, Impaired Insight into Safety  Bathroom Safety: Impaired, Impulsive, Requires Supervision for Safety, Impaired Insight into Safety, Requires Cuing for Safety  Comments: see FIMs for ADL performance details.    Subjective      Pt and spouse received in therapy gym, ready for FT session. Spouse required assist from therapist for sit>stand from low chair.     Objective       03/06/20 1301   Precautions   Precautions Fall Risk   Comments left hemianopsia   Interdisciplinary Plan of Care Collaboration   IDT Collaboration with  Family / Caregiver;Speech Therapist   Patient Position at End of Therapy Seated;Chair Alarm On;Self Releasing Lap Belt Applied;Family / Friend in Room   Collaboration Comments spouse present for FT; transfer of care to speech therapy   OT Total Time Spent   OT Individual Total Time Spent (Mins) 30   OT Charge Group   OT Therapy Activity 2     Family training completed with pt and spouse with focus on bathroom safety, transfers and DME. Pt completed toilet transfer x2 trials from FWW level with CGA-close SBA, GB and cues for safety. Pt completed dry tub/shower transfer from FWW level with tub transfer bench, GB and min A-CGA and cues for safety. Spouse observed therapist providing assistance, as she is unable to provide physical assist at d/c but can provide supervision.      Discussed pt's CLOF, need for min A to close SBA for all mobility, transfers and ADLs, and ongoing balance and visual impairments.      Reviewed home safety and fall prevention checklist. Also discussed DME needs for d/c (fixed GB in shower and near toilet, and tub txfr bench) and provided handout  with list of vendors.       Assessment    Pt and spouse receptive to recommendations provided during FT session. Pt con't to require CGA-close SBA for all mobility and ADLs d/t impaired balance, visual deficits and impaired cog. Spouse will be unable to provide physical assist at d/c.     Plan    Cont'd OT for neuro re-education, vision, balance, strength, endurance, education for safe ADL's and transfers. Home evaluation scheduled Thurs 3/12 at 1 pm.

## 2020-03-06 NOTE — DISCHARGE PLANNING
Met with wife to provide pamphlets on ACD and PCA services. She stated patient's brother will be arriving on 3/16/20 to get grab bars installed and assist with care of patient and patient's brother. Discussed choice -  [facility] and no preference on DME vendors per wife. We discussed need for home safety eval as patient has interior stairs. Tiana OT, scheduled this with wife for 1PM on Thursday, 3/12/20.

## 2020-03-06 NOTE — THERAPY
Speech Language Pathology  Daily Treatment     Patient Name: Chilo Cabrera  Age:  84 y.o., Sex:  male  Medical Record #: [id number]  Today's Date: 3/6/2020     Subjective    Pt pleasant and cooperative, wife present and supportive.      Objective       03/06/20 1333   SLP Total Time Spent   SLP Individual Total Time Spent (Mins) 60   Charge Group   SLP Cognitive Skill Development First 15 Minutes 1   SLP Cognitive Skill Development Additional 15 Minutes 3   Assessment    Pts SO requesting to speak with CM, CM contacted by SLP and to follow up with pt and SO in room after therapy session. Pt presented with o-log and achieved a score of 16/30 on this date. Pt perseverative re: desire  To go home. Written visual aid formulated and hung in pts room with reference (calendar with target d/c date). Visual scanning single target completed with 100% accuracy and MIN A. Pt then presented with mult targets (I.e., blue square, red square etc.) initial trial completed with 80% accuracy indep, trial 2 and 3 completed with 60% accuracy indep, MOD-MAX A for 100% to be achieved at this time.    Plan    Simple visual scanning, attention and orientation.

## 2020-03-06 NOTE — THERAPY
Recreational Therapy  Daily Treatment     Patient Name: Chilo Cabrera  AGE:  84 y.o., SEX:  male  Medical Record #: [id number]  Today's Date: 3/6/2020       Subjective    Pt ready and willing for session. Agreable to treatment.      Objective       03/06/20 0901   Functional Ability Status - Cognitive   Attention Span Remains on Task with Cueing   Comprehension Follows One Step Commands   Judgment Needs Assistance   Cognitive Comments Mod verbal cues to focus on task and follow directions.    Functional Ability Status - Emotional    Affect Appropriate   Mood Appropriate   Behavior Appropriate   Skilled Intervention    Skilled Intervention Cognitive Leisure   Skilled Intervention Comments Line bisection and clock drawing tests   Interdisciplinary Plan of Care Collaboration   Patient Position at End of Therapy Seated;Other (Comments)  (in main gym)   Treatment Time   Total Time Spent (mins) 30   Procedural Tracking   Procedural Tracking Cognitive Skills Training       Assessment    Pt was given 20 lines to bisect on 3 separate sheets of paper. Pt showing L neglect by ignoring the lines on the left even with Mod to Max verbal cues.Pt was also given the clock drawing activity. Showing left side neglect by not drawing on the left andwhen asked to poin to the middle of the clock he pointed to the right side.     Plan    Motor planning

## 2020-03-06 NOTE — CARE PLAN
Problem: Safety  Goal: Will remain free from injury  Outcome: PROGRESSING AS EXPECTED  Goal: Will remain free from falls  Outcome: PROGRESSING AS EXPECTED   Pt is impulsive, reinforced education throughout shift, chair and bed alarms in place, seat belt in place on wheelchair, will continue to reinforce and monitor.     Problem: Knowledge Deficit  Goal: Knowledge of disease process/condition, treatment plan, diagnostic tests, and medications will improve  Outcome: PROGRESSING SLOWER THAN EXPECTED   Pt shows poor understanding regarding proper hydration, and infection prevention. pt has been incontinent to urine throughout shift, will continue to provide care and reinforce care plan as per order.

## 2020-03-06 NOTE — CARE PLAN
Problem: Communication  Goal: The ability to communicate needs accurately and effectively will improve  Outcome: PROGRESSING AS EXPECTED   This patient is oriented to himself and hospital only.  Disoriented to time, situation.    Problem: Safety  Goal: Will remain free from injury  Outcome: PROGRESSING AS EXPECTED  Patient is impulsive, he is in a room close to the nurses station for this reason.  Bed and chair alarms in place.

## 2020-03-06 NOTE — THERAPY
Physical Therapy   Daily Treatment     Patient Name: Chilo Cabrera  Age:  84 y.o., Sex:  male  Medical Record #: [id number]  Today's Date: 3/6/2020     Precautions  Precautions: Fall Risk  Comments: left hemianopsia    Subjective    Pt and wife agreeable to FT     Objective       03/06/20 1231   Interdisciplinary Plan of Care Collaboration   Patient Position at End of Therapy Seated  (handoff OT)   PT Total Time Spent   PT Individual Total Time Spent (Mins) 30   PT Charge Group   PT Gait Training 1   PT Therapeutic Activities 1       FIM Walking Score:  4 - Minimal Assistance  Walking Description:  Walker, Extra time, Safety concerns, Verbal cueing, Requires incidental assist(CGA - max cues for avoiding objects)    FT with spouse - edu on pts cog and vision status and that she will need to provide lots of cues for L attention and direction. Spouse unable to provide any assist beyond SPV dt her own balance deficits.     Assessment    Pt cont to require CGA with mobility dt poor vision and poor attention and balance on L side. Wife able to provide good cues this session for direction and attention but unable to provide any CGA dt her balance.     Plan    Walk FWW, Vanessa, Vector no AD, lite gait for increased speed, dynamic balance challenges, navigating tight spaces/home environment with FWW, L attention and LLE forced use, family training as able, progress to SPV level mobility to be able to DC to home

## 2020-03-06 NOTE — THERAPY
"Physical Therapy   Daily Treatment     Patient Name: Chilo Cabrera  Age:  84 y.o., Sex:  male  Medical Record #: [id number]  Today's Date: 3/6/2020     Precautions  Precautions: Fall Risk  Comments: left hemianopsia    Subjective    \"Is this my house? It looks familiar\"     Objective       03/06/20 0831   Interdisciplinary Plan of Care Collaboration   Patient Position at End of Therapy Seated  (handoff rec therapy)   PT Total Time Spent   PT Individual Total Time Spent (Mins) 30   PT Charge Group   PT Neuromuscular Re-Education / Balance 1   PT Therapeutic Activities 1       FIM Wheelchair Score:  4 - Minimal Assistance  Wheelchair Description:  Extra time, Adaptive equipment, Requires incidental assist, Safety concerns, Verbal cueing    Standing reaching activitiy with L hand reaching to L to grab rings and place on another peg, cues for placing on peg all the way to L, SPV for balance with RUE support on mat in front of him    Assessment    Pt cont to demo difficulty scanning L but does show improvement - able to perform activity with min cues this session    Plan    Walk FWW, Vanessa, Vector no AD, lite gait for increased speed, dynamic balance challenges, navigating tight spaces/home environment with FWW, L attention and LLE forced use, family training as able,     "

## 2020-03-06 NOTE — CONSULTS
DATE OF SERVICE:  03/03/2020    BEHAVIORAL MEDICINE EVALUATION    BRIEF HISTORY OF PRESENTING COMPLAINTS:  The patient is an 84-year-old white    male who is referred for a behavioral medicine evaluation by Dr. Chase.  The patient was transferred to rehab from acute where he was treated   for a CVA due to thrombosis of the right posterior cerebral artery.  Patient   experienced a left body involvement.  The patient was stabilized acutely and   then sent to rehab to address his general debility.    PAST MEDICAL HISTORY:  Significant for carotid artery stenosis, cataracts   bilaterally, gout, history of ITP, hypertension, thrombocytopenia, and TIA.    PSYCHOLOGICAL STATUS:  MENTAL STATUS EXAMINATION:  The patient is a thinly built elderly male of tall   stature who appeared his stated age of 84.  At presentation, the patient was   alert.  He was sitting in a wheelchair next to his bed when approached.  The   patient oriented fairly well to my presence.  The patient was kempt in   appearance.  He was dressed casually in street attire that was appropriate to   his age and setting.  The patient's manner of presentation was cooperative and   friendly for the most part.    The patient was oriented to place, but not precisely to the time or date.  His   language was logical and goal oriented, but sometimes circumstantial.  His   speech was hesitant and slow.  The patient's concentration and memory   functioning appeared diminished.    The patient's affect was slightly constricted, stable, and mildly intense.  He   related only marginally well.  His mood appeared somewhat dysphoric, but   appropriate to the context.    There is no evidence of delusional or perceptual disturbance.  Also, the   patient showed no unusual pain or motor behavior during the interview.    SPECIFIC BEHAVIORAL COMPLAINTS:  The patient will be assessed again for any   particular problem behaviors he might be experiencing.  Since the  interview   was interrupted by the staff/hospital routine.    PSYCHIATRIC HISTORY:  Deferred    PSYCHOMETRIC TESTING:  Deferred.    SOCIAL HISTORY:  The patient is retired and .  He lives with his wife   in [place], [place].    IMPRESSION:  Mild adjustment disorder with dysphoric mood.    RECOMMENDATIONS:  Patient will be followed for status and supportive care.       ____________________________________     CARMELO FRAIRE, PHD    MARCELLE / TIMO    DD:  03/05/2020 15:56:20  DT:  03/05/2020 18:28:58    D#:  [id number]  Job#:  [id number]

## 2020-03-07 LAB
ANION GAP SERPL CALC-SCNC: 7 MMOL/L (ref 0–11.9)
BUN SERPL-MCNC: 16 MG/DL (ref 8–22)
CALCIUM SERPL-MCNC: 9.4 MG/DL (ref 8.5–10.5)
CHLORIDE SERPL-SCNC: 105 MMOL/L (ref 96–112)
CO2 SERPL-SCNC: 28 MMOL/L (ref 20–33)
CREAT SERPL-MCNC: 0.89 MG/DL (ref 0.5–1.4)
GLUCOSE SERPL-MCNC: 113 MG/DL (ref 65–99)
MAGNESIUM SERPL-MCNC: 1.9 MG/DL (ref 1.5–2.5)
POTASSIUM SERPL-SCNC: 3.5 MMOL/L (ref 3.6–5.5)
SODIUM SERPL-SCNC: 140 MMOL/L (ref 135–145)

## 2020-03-07 PROCEDURE — 700102 HCHG RX REV CODE 250 W/ 637 OVERRIDE(OP): Performed by: PHYSICAL MEDICINE & REHABILITATION

## 2020-03-07 PROCEDURE — 97130 THER IVNTJ EA ADDL 15 MIN: CPT

## 2020-03-07 PROCEDURE — A9270 NON-COVERED ITEM OR SERVICE: HCPCS | Performed by: PHYSICAL MEDICINE & REHABILITATION

## 2020-03-07 PROCEDURE — 770010 HCHG ROOM/CARE - REHAB SEMI PRIVAT*

## 2020-03-07 PROCEDURE — 36415 COLL VENOUS BLD VENIPUNCTURE: CPT

## 2020-03-07 PROCEDURE — 83735 ASSAY OF MAGNESIUM: CPT

## 2020-03-07 PROCEDURE — 97129 THER IVNTJ 1ST 15 MIN: CPT

## 2020-03-07 PROCEDURE — 80048 BASIC METABOLIC PNL TOTAL CA: CPT

## 2020-03-07 RX ADMIN — CHLORHEXIDINE GLUCONATE 0.12% ORAL RINSE 15 ML: 1.2 LIQUID ORAL at 21:42

## 2020-03-07 RX ADMIN — ASPIRIN 325 MG: 325 TABLET, COATED ORAL at 07:56

## 2020-03-07 RX ADMIN — ALLOPURINOL 300 MG: 300 TABLET ORAL at 07:56

## 2020-03-07 RX ADMIN — MELATONIN 2000 UNITS: at 07:56

## 2020-03-07 RX ADMIN — CHLORHEXIDINE GLUCONATE 0.12% ORAL RINSE 15 ML: 1.2 LIQUID ORAL at 07:57

## 2020-03-07 NOTE — CARE PLAN
Problem: Safety  Goal: Will remain free from injury  Outcome: PROGRESSING SLOWER THAN EXPECTED  Note: Patient is impulsive. Oriented to self only. No safety awareness. High risk for fall. Room is close to nursing station.      Problem: Urinary Elimination:  Goal: Ability to reestablish a normal urinary elimination pattern will improve  Outcome: PROGRESSING AS EXPECTED  Note: Patient remains continent of bladder during shift with time void.

## 2020-03-07 NOTE — PROGRESS NOTES
"Rehab Progress Note     Date of Service: 3/6/2020  Chief Complaint: follow up stroke    Interval Events (Subjective)    Patient seen and examined today in the hallway.  He is sitting outside the nursing station.  He denies any pain.  He does continue to be disoriented however and really wants to know when he can go home with his wife.  He has no new complaints.    Objective:  VITAL SIGNS: /79   Pulse 60   Temp 36.2 °C (97.2 °F) (Temporal)   Resp 18   Ht 1.951 m (6' 4.81\")   Wt 77.5 kg (170 lb 13.7 oz)   SpO2 97%   BMI 20.36 kg/m²   Gen: alert, no apparent distress  HEENT: halitosis  CV: regular rate and rhythm, no murmurs, no peripheral edema  Resp: clear to ascultation bilaterally, normal respiratory effort  GI: soft, non-tender abdomen, bowel sounds present  Neuro: notable for disorientation, left visual field cut    Recent Results (from the past 72 hour(s))   URINALYSIS    Collection Time: 03/04/20 12:30 AM   Result Value Ref Range    Color DK Yellow     Character Clear     Specific Gravity 1.015 <1.035    Ph 5.0 5.0 - 8.0    Glucose Negative Negative mg/dL    Ketones Negative Negative mg/dL    Protein Negative Negative mg/dL    Bilirubin Negative Negative    Urobilinogen, Urine 0.2 Negative    Nitrite Negative Negative    Leukocyte Esterase Trace (A) Negative    Occult Blood Negative Negative    Micro Urine Req Microscopic    URINE MICROSCOPIC (W/UA)    Collection Time: 03/04/20 12:30 AM   Result Value Ref Range    WBC 0-2 (A) /hpf    RBC 0-2 (A) /hpf    Bacteria Negative None /hpf    Epithelial Cells Negative /hpf    Hyaline Cast 0-2 /lpf   CBC WITH DIFFERENTIAL    Collection Time: 03/04/20  6:19 AM   Result Value Ref Range    WBC 4.2 (L) 4.8 - 10.8 K/uL    RBC 4.05 (L) 4.70 - 6.10 M/uL    Hemoglobin 13.1 (L) 14.0 - 18.0 g/dL    Hematocrit 37.8 (L) 42.0 - 52.0 %    MCV 93.3 81.4 - 97.8 fL    MCH 32.3 27.0 - 33.0 pg    MCHC 34.7 33.7 - 35.3 g/dL    RDW 49.1 35.9 - 50.0 fL    Platelet Count 153 (L) " 164 - 446 K/uL    MPV 11.5 9.0 - 12.9 fL    Neutrophils-Polys 62.60 44.00 - 72.00 %    Lymphocytes 23.70 22.00 - 41.00 %    Monocytes 9.60 0.00 - 13.40 %    Eosinophils 2.90 0.00 - 6.90 %    Basophils 0.70 0.00 - 1.80 %    Immature Granulocytes 0.50 0.00 - 0.90 %    Nucleated RBC 0.00 /100 WBC    Neutrophils (Absolute) 2.61 1.82 - 7.42 K/uL    Lymphs (Absolute) 0.99 (L) 1.00 - 4.80 K/uL    Monos (Absolute) 0.40 0.00 - 0.85 K/uL    Eos (Absolute) 0.12 0.00 - 0.51 K/uL    Baso (Absolute) 0.03 0.00 - 0.12 K/uL    Immature Granulocytes (abs) 0.02 0.00 - 0.11 K/uL    NRBC (Absolute) 0.00 K/uL   Comp Metabolic Panel    Collection Time: 03/04/20  6:19 AM   Result Value Ref Range    Sodium 141 135 - 145 mmol/L    Potassium 3.4 (L) 3.6 - 5.5 mmol/L    Chloride 104 96 - 112 mmol/L    Co2 27 20 - 33 mmol/L    Anion Gap 10.0 0.0 - 11.9    Glucose 93 65 - 99 mg/dL    Bun 14 8 - 22 mg/dL    Creatinine 0.96 0.50 - 1.40 mg/dL    Calcium 8.4 (L) 8.5 - 10.5 mg/dL    AST(SGOT) 34 12 - 45 U/L    ALT(SGPT) 26 2 - 50 U/L    Alkaline Phosphatase 61 30 - 99 U/L    Total Bilirubin 1.3 0.1 - 1.5 mg/dL    Albumin 3.5 3.2 - 4.9 g/dL    Total Protein 5.5 (L) 6.0 - 8.2 g/dL    Globulin 2.0 1.9 - 3.5 g/dL    A-G Ratio 1.8 g/dL   MAGNESIUM    Collection Time: 03/04/20  6:19 AM   Result Value Ref Range    Magnesium 1.7 1.5 - 2.5 mg/dL   ESTIMATED GFR    Collection Time: 03/04/20  6:19 AM   Result Value Ref Range    GFR If African American >60 >60 mL/min/1.73 m 2    GFR If Non African American >60 >60 mL/min/1.73 m 2       Current Facility-Administered Medications   Medication Frequency   • chlorhexidine (PERIDEX) 0.12 % solution 15 mL BID   • senna-docusate (PERICOLACE or SENOKOT S) 8.6-50 MG per tablet 2 Tab BID PRN    And   • polyethylene glycol/lytes (MIRALAX) PACKET 1 Packet QDAY PRN    And   • magnesium hydroxide (MILK OF MAGNESIA) suspension 30 mL QDAY PRN    And   • bisacodyl (DULCOLAX) suppository 10 mg QDAY PRN   • QUEtiapine (SEROQUEL)  tablet 25 mg Q8HRS PRN   • vitamin D (cholecalciferol) tablet 2,000 Units DAILY   • Respiratory Therapy Consult Continuous RT   • Pharmacy Consult Request ...Pain Management Review 1 Each PHARMACY TO DOSE   • acetaminophen (TYLENOL) tablet 650 mg Q4HRS PRN   • hydrALAZINE (APRESOLINE) tablet 25 mg Q8HRS PRN   • artificial tears ophthalmic solution 1 Drop PRN   • benzocaine-menthol (CEPACOL) lozenge 1 Lozenge Q2HRS PRN   • mag hydrox-al hydrox-simeth (MAALOX PLUS ES or MYLANTA DS) suspension 20 mL Q2HRS PRN   • ondansetron (ZOFRAN ODT) dispertab 4 mg 4X/DAY PRN    Or   • ondansetron (ZOFRAN) syringe/vial injection 4 mg 4X/DAY PRN   • traZODone (DESYREL) tablet 50 mg QHS PRN   • sodium chloride (OCEAN) 0.65 % nasal spray 2 Spray PRN   • hydrOXYzine HCl (ATARAX) tablet 50 mg Q6HRS PRN   • melatonin tablet 3 mg HS PRN   • lactulose 20 GM/30ML solution 30 mL QDAY PRN   • docusate sodium (ENEMEEZ) enema 283 mg QDAY PRN   • Eltrombopag Olamine TABS 50 mg DAILY   • allopurinol (ZYLOPRIM) tablet 300 mg DAILY   • aspirin EC (ECOTRIN) tablet 325 mg DAILY       Orders Placed This Encounter   Procedures   • Diet Order Regular     Standing Status:   Standing     Number of Occurrences:   1     Order Specific Question:   Diet:     Answer:   Regular [1]       Assessment:  Active Hospital Problems    Diagnosis   • *Cerebrovascular accident (CVA) due to thrombosis of right posterior cerebral artery (HCC)   • Vitamin D deficiency   • Visual field cut   • Cognitive deficits   • Essential hypertension   • History of ITP   • History of gout     This patient is a 84 y.o. male admitted for acute inpatient rehabilitation with Cerebrovascular accident (CVA) due to thrombosis of right posterior cerebral artery (HCC).    I led and attended the weekly conference, and agree with the IDT conference documentation and plan of care as noted below.    Date of conference: 3/4/2020    Goals and barriers: See IDT note.    Biggest barriers: bladder  incontinence, severe cognitive deficits, impulsive, poor insight into deficits, very impaired vision on the left    CM/social support: wife supportive, but likely can't physically assist    Anticipated DC date: 3/14/2020    Home health: PT/OT/SLP/RN    Equip: FWW    Follow up: PCP, rehab clinic      Medical Decision Making and Plan:    Right PCA stroke  Left homonymous hemianopsia  Left pronator drift  Dominant  Cognitive deficits, severe  Continue full rehab program  PT/OT/SLP, 1 hr each discipline, 5 days per week  Continue aspirin for secondary stroke prophylaxis  Statin not indicated, LDL 32    Encephalopathy  Negative UA  Order PRN Seroquel for agitation/paranoia at night, not using    Halitosis  Start Peridex, oral care    History of gout  Continue allopurinol    History of Immune thrombocytopenia  Continue home medication, Eltrombopag  Monitor labs, stable on recheck - order for Monday    Normocytic anemia  Mild, stable    Vit D deficiency, 15  Continue supplementation    Hypokalemia  Give one dose of potassium  Recheck in am    Bowel  Constipation, resolved  As needed bowel meds  Last BM 3/6, 2 large loose stools  Continue to monitor    Bladder  Check PVRs -59, 149, 50, 96  Not retaining  ICP for over 400 cc  Scheduled toileting     DVT prophylaxis  Discontinue Lovenox as he is ambulating long distances    Total time:  16 minutes.  I spent greater than 50% of the time for patient care, counseling, and coordination on this date, including patient face-to face time, unit/floor time with review of records/pertinent lab data and studies, as well as discussing diagnostic evaluation/work up, planned therapeutic interventions, and future disposition of care, as per the interval events/subjective and the assessment and plan as noted above.    I have performed a physical exam, reviewed and updated ROS, as well as the assessment and plan today 3/6/2020. In review of note from 3/5/2020 there are no new changes except  as documented above.    Elba Chase M.D.   Physical Medicine and Rehabilitation

## 2020-03-07 NOTE — THERAPY
"Speech Language Pathology  Daily Treatment     Patient Name: Chilo Cabrera  Age:  84 y.o., Sex:  male  Medical Record #: [id number]  Today's Date: 3/7/2020     Subjective    Pt perseverating on going home stating \"I'm going to call the  or the FBI If you don't get me out of here right now!\"  Pt also perseverating on locating his wife stating \"She's missing, we need to call the police to locate her.\"  Pt unable to be redirected with therapy.  Phone call made to pt's wife who spoke with pt for several minutes telling him that she was unable to come in today, but that she would be here tomorrow.  Pt continued to verbalize frustration about staying at the rehab hospital, but was able to be redirected to ST tasks after speaking with his wife.       Objective       03/07/20 0931   SLP Total Time Spent   SLP Individual Total Time Spent (Mins) 60   Charge Group   SLP Cognitive Skill Development First 15 Minutes 1   SLP Cognitive Skill Development Additional 15 Minutes 3       Assessment    O-log completed, pt scored a 12/30 (Decreased from a 16/30 yesterday).  Attempted to complete following single step direction tasks; however pt unable to see most of the page.  Pt reported that he did not wear glasses, but when his room was checked pt had a pair of glasses at bedside.  Pt still unable to see the left side of the page with or without his glasses on.  Attempted to complete a single visual scanning task; however pt was unable to see the letters correctly and when attempting to [place] letters circled blank spaces stating \"There's a K\".  This task was also abandoned d/t difficulty.  Pt was able to sort a deck of cards into 4 piles (suit) with min-mod A for attention.  Nursing made aware of possible change in function as pt was able to complete visual scanning tasks with min A the last few days per therapy notes.      Plan    Continue O-log, targeting attention and memory     "

## 2020-03-07 NOTE — CARE PLAN
Problem: Safety  Goal: Will remain free from injury  Outcome: PROGRESSING SLOWER THAN EXPECTED  [place] has been confused this shift.  Oriented to self, he wanted to call his wife earlier in the evening.  She spoke with him on the telephone and assured him that he is to stay here in the hospital and that she will come to see him tomorrow.   Almost as soon as the phone was hung up, he was looking for her, wanting to call her and could not recall that he had just spoken with her.    2153 Melatonin 3 mg given,  Patient remained wide awake.    2305 Trazodone given,  patient assisted to bed.  After about 10 minutes he was climbing out of his bed, stating he was going to look for his wife.

## 2020-03-07 NOTE — CARE PLAN
Problem: Communication  Goal: The ability to communicate needs accurately and effectively will improve  Outcome: PROGRESSING AS EXPECTED  Note: Patient able to verbalize needs.  Will continue to monitor.      Problem: Safety  Goal: Will remain free from injury  Outcome: PROGRESSING AS EXPECTED  Note: Pt uses call light consistently and appropriately. Waits for assistance does not attempt self transfer this shift. Able to verbalize needs.

## 2020-03-08 PROCEDURE — 770010 HCHG ROOM/CARE - REHAB SEMI PRIVAT*

## 2020-03-08 PROCEDURE — A9270 NON-COVERED ITEM OR SERVICE: HCPCS | Performed by: PHYSICAL MEDICINE & REHABILITATION

## 2020-03-08 PROCEDURE — 700102 HCHG RX REV CODE 250 W/ 637 OVERRIDE(OP): Performed by: PHYSICAL MEDICINE & REHABILITATION

## 2020-03-08 RX ADMIN — ALLOPURINOL 300 MG: 300 TABLET ORAL at 08:02

## 2020-03-08 RX ADMIN — ASPIRIN 325 MG: 325 TABLET, COATED ORAL at 08:02

## 2020-03-08 RX ADMIN — SENNOSIDES AND DOCUSATE SODIUM 2 TABLET: 8.6; 5 TABLET ORAL at 08:02

## 2020-03-08 RX ADMIN — CHLORHEXIDINE GLUCONATE 0.12% ORAL RINSE 15 ML: 1.2 LIQUID ORAL at 08:02

## 2020-03-08 RX ADMIN — MELATONIN 2000 UNITS: at 08:02

## 2020-03-08 RX ADMIN — QUETIAPINE 25 MG: 25 TABLET ORAL at 21:55

## 2020-03-08 RX ADMIN — CHLORHEXIDINE GLUCONATE 0.12% ORAL RINSE 15 ML: 1.2 LIQUID ORAL at 20:39

## 2020-03-08 RX ADMIN — QUETIAPINE 25 MG: 25 TABLET ORAL at 00:57

## 2020-03-08 ASSESSMENT — PATIENT HEALTH QUESTIONNAIRE - PHQ9
2. FEELING DOWN, DEPRESSED, IRRITABLE, OR HOPELESS: NOT AT ALL
1. LITTLE INTEREST OR PLEASURE IN DOING THINGS: NOT AT ALL
SUM OF ALL RESPONSES TO PHQ9 QUESTIONS 1 AND 2: 0

## 2020-03-08 ASSESSMENT — FIBROSIS 4 INDEX: FIB4 SCORE: 3.62

## 2020-03-08 NOTE — CARE PLAN
Problem: Safety  Goal: Will remain free from injury  Outcome: PROGRESSING AS EXPECTED  Patient has continued to be impulsive this shift.  He does not use his call light but just quickly gets up on his own.   Bed alarm in place and functioning properly.      0057 Patient climbing over his bedrail.  Says he has to leave.  Tried to reorient patient, he still says he has to leave.  Assisted up to wheelchair, Seroquel 25 mg given.  Patient being wheeled around facility by CNA at this time.    0615 Patient Impulsive.  Climbing out of bed.  Alarms in place.

## 2020-03-09 LAB
AMORPH CRY #/AREA URNS HPF: PRESENT /HPF
APPEARANCE UR: ABNORMAL
BACTERIA #/AREA URNS HPF: NEGATIVE /HPF
BASOPHILS # BLD AUTO: 0.5 % (ref 0–1.8)
BASOPHILS # BLD: 0.03 K/UL (ref 0–0.12)
BILIRUB UR QL STRIP.AUTO: ABNORMAL
COLOR UR: ABNORMAL
EOSINOPHIL # BLD AUTO: 0.1 K/UL (ref 0–0.51)
EOSINOPHIL NFR BLD: 1.8 % (ref 0–6.9)
EPI CELLS #/AREA URNS HPF: ABNORMAL /HPF
ERYTHROCYTE [DISTWIDTH] IN BLOOD BY AUTOMATED COUNT: 50.1 FL (ref 35.9–50)
GLUCOSE UR STRIP.AUTO-MCNC: NEGATIVE MG/DL
HCT VFR BLD AUTO: 41.2 % (ref 42–52)
HGB BLD-MCNC: 14.1 G/DL (ref 14–18)
IMM GRANULOCYTES # BLD AUTO: 0.02 K/UL (ref 0–0.11)
IMM GRANULOCYTES NFR BLD AUTO: 0.4 % (ref 0–0.9)
KETONES UR STRIP.AUTO-MCNC: ABNORMAL MG/DL
LEUKOCYTE ESTERASE UR QL STRIP.AUTO: ABNORMAL
LYMPHOCYTES # BLD AUTO: 1.03 K/UL (ref 1–4.8)
LYMPHOCYTES NFR BLD: 18.6 % (ref 22–41)
MCH RBC QN AUTO: 32 PG (ref 27–33)
MCHC RBC AUTO-ENTMCNC: 34.2 G/DL (ref 33.7–35.3)
MCV RBC AUTO: 93.6 FL (ref 81.4–97.8)
MICRO URNS: ABNORMAL
MONOCYTES # BLD AUTO: 0.34 K/UL (ref 0–0.85)
MONOCYTES NFR BLD AUTO: 6.1 % (ref 0–13.4)
MUCOUS THREADS #/AREA URNS HPF: ABNORMAL /HPF
NEUTROPHILS # BLD AUTO: 4.02 K/UL (ref 1.82–7.42)
NEUTROPHILS NFR BLD: 72.6 % (ref 44–72)
NITRITE UR QL STRIP.AUTO: NEGATIVE
NRBC # BLD AUTO: 0 K/UL
NRBC BLD-RTO: 0 /100 WBC
PH UR STRIP.AUTO: 5 [PH] (ref 5–8)
PLATELET # BLD AUTO: 129 K/UL (ref 164–446)
PMV BLD AUTO: 11.6 FL (ref 9–12.9)
PROT UR QL STRIP: NEGATIVE MG/DL
RBC # BLD AUTO: 4.4 M/UL (ref 4.7–6.1)
RBC # URNS HPF: ABNORMAL /HPF
RBC UR QL AUTO: NEGATIVE
SP GR UR STRIP.AUTO: 1.02
UROBILINOGEN UR STRIP.AUTO-MCNC: 1 MG/DL
WBC # BLD AUTO: 5.5 K/UL (ref 4.8–10.8)
WBC #/AREA URNS HPF: ABNORMAL /HPF

## 2020-03-09 PROCEDURE — 97535 SELF CARE MNGMENT TRAINING: CPT

## 2020-03-09 PROCEDURE — 81001 URINALYSIS AUTO W/SCOPE: CPT

## 2020-03-09 PROCEDURE — 97530 THERAPEUTIC ACTIVITIES: CPT

## 2020-03-09 PROCEDURE — 87186 SC STD MICRODIL/AGAR DIL: CPT

## 2020-03-09 PROCEDURE — 97129 THER IVNTJ 1ST 15 MIN: CPT

## 2020-03-09 PROCEDURE — 700102 HCHG RX REV CODE 250 W/ 637 OVERRIDE(OP): Performed by: PHYSICAL MEDICINE & REHABILITATION

## 2020-03-09 PROCEDURE — 97112 NEUROMUSCULAR REEDUCATION: CPT

## 2020-03-09 PROCEDURE — 85025 COMPLETE CBC W/AUTO DIFF WBC: CPT

## 2020-03-09 PROCEDURE — A9270 NON-COVERED ITEM OR SERVICE: HCPCS | Performed by: PHYSICAL MEDICINE & REHABILITATION

## 2020-03-09 PROCEDURE — 97110 THERAPEUTIC EXERCISES: CPT

## 2020-03-09 PROCEDURE — 770010 HCHG ROOM/CARE - REHAB SEMI PRIVAT*

## 2020-03-09 PROCEDURE — 87077 CULTURE AEROBIC IDENTIFY: CPT

## 2020-03-09 PROCEDURE — 97116 GAIT TRAINING THERAPY: CPT

## 2020-03-09 PROCEDURE — 99233 SBSQ HOSP IP/OBS HIGH 50: CPT | Performed by: PHYSICAL MEDICINE & REHABILITATION

## 2020-03-09 PROCEDURE — 36415 COLL VENOUS BLD VENIPUNCTURE: CPT

## 2020-03-09 PROCEDURE — 97130 THER IVNTJ EA ADDL 15 MIN: CPT

## 2020-03-09 PROCEDURE — 87086 URINE CULTURE/COLONY COUNT: CPT

## 2020-03-09 RX ORDER — LANOLIN ALCOHOL/MO/W.PET/CERES
3 CREAM (GRAM) TOPICAL
Status: DISCONTINUED | OUTPATIENT
Start: 2020-03-09 | End: 2020-03-14 | Stop reason: HOSPADM

## 2020-03-09 RX ORDER — POTASSIUM CHLORIDE 20 MEQ/1
40 TABLET, EXTENDED RELEASE ORAL ONCE
Status: COMPLETED | OUTPATIENT
Start: 2020-03-09 | End: 2020-03-09

## 2020-03-09 RX ORDER — TRAZODONE HYDROCHLORIDE 50 MG/1
50 TABLET ORAL
Status: DISCONTINUED | OUTPATIENT
Start: 2020-03-09 | End: 2020-03-14 | Stop reason: HOSPADM

## 2020-03-09 RX ADMIN — CHLORHEXIDINE GLUCONATE 0.12% ORAL RINSE 15 ML: 1.2 LIQUID ORAL at 08:05

## 2020-03-09 RX ADMIN — ALLOPURINOL 300 MG: 300 TABLET ORAL at 08:05

## 2020-03-09 RX ADMIN — MELATONIN 2000 UNITS: at 08:05

## 2020-03-09 RX ADMIN — POTASSIUM CHLORIDE 40 MEQ: 1500 TABLET, EXTENDED RELEASE ORAL at 11:29

## 2020-03-09 RX ADMIN — HYDROXYZINE HYDROCHLORIDE 50 MG: 25 TABLET, FILM COATED ORAL at 18:33

## 2020-03-09 RX ADMIN — QUETIAPINE 25 MG: 25 TABLET ORAL at 14:55

## 2020-03-09 RX ADMIN — TRAZODONE HYDROCHLORIDE 50 MG: 50 TABLET ORAL at 20:01

## 2020-03-09 RX ADMIN — CHLORHEXIDINE GLUCONATE 0.12% ORAL RINSE 15 ML: 1.2 LIQUID ORAL at 20:01

## 2020-03-09 RX ADMIN — ASPIRIN 325 MG: 325 TABLET, COATED ORAL at 08:05

## 2020-03-09 RX ADMIN — MELATONIN 3 MG: at 20:01

## 2020-03-09 ASSESSMENT — BALANCE ASSESSMENTS
LOOK OVER LEFT AND RIGHT SHOULDERS WHILE STANDING: 1
SITTING UNSUPPORTED: 4
LONG VERSION TOTAL SCORE (MAX 56): 28
PLACE ALTERNATE FOOT ON STEP OR STOOL WHILE STANDING UNSUPPORTED: 2
STANDING UNSUPPORTED WITH FEET TOGETHER: 1
STANDING TO SITTING: 3
REACHING FORWARD WITH OUTSTRETCHED ARM WHILE STANDING: 1
STANDING ON ONE LEG: 1
TRANSFERS: 1
PICK UP OBJECT FROM THE FLOOR FROM A STANDING POSITION: 3
LONG VERSION TOTAL SCORE (MAX 56): 28
STANDING UNSUPPORTED ONE FOOT IN FRONT: 1
STANDING UNSUPPORTED: 3
TURN 360 DEGREES: 1
STANDING UNSUPPORTED WITH EYES CLOSED: 3
SITTING TO STANDING: 3

## 2020-03-09 ASSESSMENT — PATIENT HEALTH QUESTIONNAIRE - PHQ9
1. LITTLE INTEREST OR PLEASURE IN DOING THINGS: NOT AT ALL
2. FEELING DOWN, DEPRESSED, IRRITABLE, OR HOPELESS: NOT AT ALL
SUM OF ALL RESPONSES TO PHQ9 QUESTIONS 1 AND 2: 0

## 2020-03-09 NOTE — THERAPY
"Recreational Therapy  Daily Treatment     Patient Name: Chilo Cabrera  AGE:  84 y.o., SEX:  male  Medical Record #: [id number]  Today's Date: 3/9/2020       Subjective    \"I'm going to refuse your service.\" \"I need to get out of here.\" When asked if he needed anything prior to leaving the room he said to be pushed out the stairs so he could go home.\" Wife shared that he had had a bad day with frusteration.      Objective       03/09/20 1301   Functional Ability Status - Cognitive   Attention Span Remains on Task with Cueing   Comprehension Follows One Step Commands   Judgment Needs Assistance   Functional Ability Status - Emotional    Affect Angry   Mood Agitated;Labile   Behavior Resistant   Emotional Comments Pt repeating that he needs to go home.    Skilled Intervention    Skilled Intervention Leisure Education    Skilled Intervention Comments Unable to work with Pt due to refusal.    Interdisciplinary Plan of Care Collaboration   IDT Collaboration with  Family / Caregiver   Patient Position at End of Therapy Seated;Call Light within Reach;Self Releasing Lap Belt Applied;Tray Table within Reach;Family / Friend in Room   Collaboration Comments Wife was in room.    Treatment Time   Total Time Spent (mins) 15   Total Time Missed 15   Reasons for Time Missed Medical-Patient Agitated;Non-Medical-Patient  Refused   Procedural Tracking   Procedural Tracking Cognitive Skills Training       Assessment    Pt initially refused the activity even with his Wife's encouragement. This writer was informed that he was willing for session 10 minutes into the session time. He was given a money sorting activity with both cash and coin. He was able to complete this activity with Min verbal cues and extra time.     Plan    Sorting and cognitive leisure.   "

## 2020-03-09 NOTE — THERAPY
"Speech Language Pathology  Daily Treatment     Patient Name: Chilo Cabrera  Age:  84 y.o., Sex:  male  Medical Record #: [id number]  Today's Date: 3/9/2020     Subjective    Pt reporting \"im going home - otherwise im going to the police\" with with suspected confabulations stating theres \"a large drug deal going on here.\" Reinforcement provided that pt is in the hospital in a safe place.      Objective       03/09/20 0903   SLP Total Time Spent   SLP Individual Total Time Spent (Mins) 30   Charge Group   SLP Cognitive Skill Development First 15 Minutes 1   SLP Cognitive Skill Development Additional 15 Minutes 1       Assessment    Pt perseverative re: need to go home and that \"sometimes its worth the risk.\" Poor insight and awareness re: fall risk and deficits impacting return home. Pt presented with O-log pt achieved a score of 14/30 on this date. Single target visual scanning completed with 90% accuracy trial 1 and 80% accuracy trial 2 and 3, increased to 100% provided MOD A.    Plan  Simple attention, visual scanning, orientation and recall    "

## 2020-03-09 NOTE — THERAPY
Physical Therapy   Daily Treatment     Patient Name: Chilo Cabrera  Age:  84 y.o., Sex:  male  Medical Record #: [id number]  Today's Date: 3/9/2020     Precautions  Precautions: Fall Risk, Other (See Comments)  Comments: left hemianopsia    Subjective    Pt reports that his wife is in danger so he needs to go find her and that he busted up a drug deal last night and need to let the FBI know.     Objective       03/09/20 0931   Bed Mobility    Supine to Sit Supervised   Sit to Supine Supervised   Sit to Stand Supervised   Scooting Supervised   Rolling Supervised   Lower Extremity Outcome Measures   Lower Extremity Outcome Measures Utilized Vanessa Balance Scale   Vanessa Balance Scale   Sitting Unsupported (Score 0-4) 4   Change Of Positon: Sitting To Standing (Score 0-4) 3   Change Of Positon: Standing To Sitting (Score 0-4) 3   Transfers (Score 0-4) 1   Standing Unsupported (Score 0-4) 3   Standing With Eyes Closed (Score 0-4) 3   Standing With Feet Together (Score 0-4) 1   Tandem Standing (Score 0-4) 1   Standing On One Leg (Score 0-4) 1   Turning Trunk (Feet Fixed) (Score 0-4) 1   Retrieving Objects From Floor (Score 0-4) 3   Turning 360 Degrees (Score 0-4) 1   Stool Stepping (Score 0-4) 2   Reaching Forward While Standing (Score 0-4) 1   Vanessa Balance Total Score (0-56) 28   Interdisciplinary Plan of Care Collaboration   Patient Position at End of Therapy Seated;Call Light within Reach;Tray Table within Reach;Self Releasing Lap Belt Applied;Chair Alarm On  (with nursing)   PT Total Time Spent   PT Individual Total Time Spent (Mins) 60   PT Charge Group   PT Gait Training 2   PT Neuromuscular Re-Education / Balance 1   PT Therapeutic Activities 1       FIM Bed/Chair/Wheelchair Transfers Score: 5 - Standby Prompting/Supervision or Set-up  Bed/Chair/Wheelchair Transfers Description:  Supervision for safety, Verbal cueing, Increased time, Adaptive equipment    FIM Walking Score:  4 - Minimal Assistance  Walking  Description:  Walker, Extra time, Safety concerns, Verbal cueing, Requires incidental assist(250 ft, constant cues for avoiding objects and directionas well as posture and FWW management )    FIM Stairs Score:  4 - Minimal Assistance  Stairs Description:  Ascends/descends 12 to 14 steps, Requires incidental assist, Hand rails, Extra time, Safety concerns, Verbal cueing      Assessment    Pt cont to be limited by poor attention to L consistently running into objects and losing sense of direction during gait - continuing to require CGA. Pt also limited by poor cognition - very confused. High fall risk via Vanessa    Plan    Walk FWW, Vector no AD, lite gait for increased speed, dynamic balance challenges, navigating tight spaces/home environment with FWW, L attention and LLE forced use, family training as able, progress to SPV level mobility to be able to DC to home, home eval 3/12?

## 2020-03-09 NOTE — THERAPY
"Speech Language Pathology  Daily Treatment     Patient Name: Chilo Cabrera  Age:  84 y.o., Sex:  male  Medical Record #: [id number]  Today's Date: 3/9/2020     Subjective    \"Dont I have any rights?\"      Objective       03/09/20 1433   SLP Total Time Spent   SLP Individual Total Time Spent (Mins) 30   Charge Group   SLP Cognitive Skill Development First 15 Minutes 1   SLP Cognitive Skill Development Additional 15 Minutes 1       Assessment    Pt with increased frustration and agitation re: \"I dont want to be here anymore.\" Pt unable to state where he is location wise (city/state) nor what kind of facility this is. MAX cues for redirection. Pt asking to call wife stating, \"im going to tell her its over, get the papers ready we are getting a divorce.\" Pt also stating \"youre going to wish you had let me go home, ill get my gun and shoot everyone in here.\" MD aware and medications to be administered RN aware. Pts wife was here just an hour ago, pt unable to remember wife being present nor conversation had with wife and MD.     Plan    Simple attention, orientation and functional ps.    "

## 2020-03-09 NOTE — CARE PLAN
Problem: Discharge Barriers/Planning  Goal: Patient's continuum of care needs will be met  Outcome: PROGRESSING SLOWER THAN EXPECTED  2155 Patient continues to be confused.  Oriented to self.    Agitated at times, voices wanting to leave.    Alarms in place to bed and wheelchair.  Medicated with Seroquel 25 mg.

## 2020-03-09 NOTE — THERAPY
"Occupational Therapy  Daily Treatment     Patient Name: Chilo Cabrera  Age:  84 y.o., Sex:  male  Medical Record #: [id number]  Today's Date: 3/9/2020     Precautions  Precautions: (P) Fall Risk, Other (See Comments)  Comments: (P) left hemianopsia    Safety   ADL Safety : (P) Requires Supervision for Safety, Requires Physical Assist for Safety, Requires Cueing for Safety, Impaired Insight into Safety  Bathroom Safety: (P) Requires Supervision for Safety, Requires Physical Assist for Safety, Requires Cuing for Safety, Impaired Insight into Safety  Comments: (P) see FIMs for grooming, toileting and toilet transfer.    Subjective    \"This place is corrupt. There's drug pushers everywhere. You need to get out of here before it's too late.\"  Pt perseverative on going home, feels unsafe in hospital environment, and worried about location of his wife. Assisted pt in calling his wife for reassurance, and wrote down schedule for when wife coming in today for pt to reference. Pt able to be redirected to therapy session with increased time.      Objective       03/09/20 0701   Precautions   Precautions Fall Risk;Other (See Comments)   Comments left hemianopsia   Safety    ADL Safety  Requires Supervision for Safety;Requires Physical Assist for Safety;Requires Cueing for Safety;Impaired Insight into Safety   Bathroom Safety Requires Supervision for Safety;Requires Physical Assist for Safety;Requires Cuing for Safety;Impaired Insight into Safety   Comments see FIMs for grooming, toileting and toilet transfer.   Cognition    Orientation Level Not Oriented to Reason;Not Oriented to Place;Not Oriented to Time   Level of Consciousness Alert   Ability To Follow Commands 1 Step   Safety Awareness Impaired;Impulsive   Attention Impaired   Sitting Lower Body Exercises   Nustep Resistance Level 4  (for general endurance training 15 min, 3 RB, cues for pacing)   Interdisciplinary Plan of Care Collaboration   IDT Collaboration with  " Nursing   Patient Position at End of Therapy Seated;Chair Alarm On;Self Releasing Lap Belt Applied;Other (Comments)  (seated in cafeteria for breakfast)   Collaboration Comments CLOF, POC   OT Total Time Spent   OT Individual Total Time Spent (Mins) 60   OT Charge Group   OT Self Care / ADL 2   OT Therapy Activity 1   OT Therapeutic Exercise  1       FIM Grooming Score:  5 - Standby Prompting/Supervision or Set-up  Grooming Description:  Increased time, Supervision for safety, Verbal cueing, Set-up of equipment(SBA/set up for oral care seated at sink.)    FIM Toiletin - Standby Prompting/Supervision or Set-up  Toileting Description:  Grab bar, Increased time, Supervision for safety, Verbal cueing, Set-up of equipment    FIM Toilet Transfer Score:  4 - Minimal Assistance  Toilet Transfer Description:  Increased time, Grab bar, Supervision for safety, Verbal cueing, Set-up of equipment(CGA-Min A with GB and FWW)      Assessment    Pt initially perseverative on calling his spouse and reporting that he needs to leave as soon as possible. Pt able to be redirected to therapy session with increased time after speaking with spouse. Pt con't to be impulsive and requires CGA-close SBA during ADLs and transfers.     Plan    Cont'd OT for neuro re-education, vision, balance, strength, endurance, education for safe ADL's and transfers. Home evaluation scheduled Thurs 3/12 at 1 pm.

## 2020-03-09 NOTE — CARE PLAN
Problem: Inadequate nutrient intake  Goal: Patient to consume greater than or equal to 50% of meals  Outcome: PROGRESSING SLOWER THAN EXPECTED   PO variable and overall inadequate.  Will add supplements to trays to encourage adequate nutrient/ fluid intake.

## 2020-03-10 DIAGNOSIS — R41.89 COGNITIVE DEFICITS: ICD-10-CM

## 2020-03-10 DIAGNOSIS — R45.1 AGITATION: ICD-10-CM

## 2020-03-10 DIAGNOSIS — I63.331 CEREBROVASCULAR ACCIDENT (CVA) DUE TO THROMBOSIS OF RIGHT POSTERIOR CEREBRAL ARTERY (HCC): ICD-10-CM

## 2020-03-10 LAB
ANION GAP SERPL CALC-SCNC: 8 MMOL/L (ref 0–11.9)
BASOPHILS # BLD AUTO: 0.9 % (ref 0–1.8)
BASOPHILS # BLD: 0.04 K/UL (ref 0–0.12)
BUN SERPL-MCNC: 14 MG/DL (ref 8–22)
CALCIUM SERPL-MCNC: 9.3 MG/DL (ref 8.5–10.5)
CHLORIDE SERPL-SCNC: 105 MMOL/L (ref 96–112)
CO2 SERPL-SCNC: 28 MMOL/L (ref 20–33)
CREAT SERPL-MCNC: 0.89 MG/DL (ref 0.5–1.4)
EOSINOPHIL # BLD AUTO: 0.09 K/UL (ref 0–0.51)
EOSINOPHIL NFR BLD: 2 % (ref 0–6.9)
ERYTHROCYTE [DISTWIDTH] IN BLOOD BY AUTOMATED COUNT: 49.1 FL (ref 35.9–50)
GLUCOSE SERPL-MCNC: 117 MG/DL (ref 65–99)
HCT VFR BLD AUTO: 37.1 % (ref 42–52)
HGB BLD-MCNC: 12.7 G/DL (ref 14–18)
IMM GRANULOCYTES # BLD AUTO: 0.01 K/UL (ref 0–0.11)
IMM GRANULOCYTES NFR BLD AUTO: 0.2 % (ref 0–0.9)
LYMPHOCYTES # BLD AUTO: 0.81 K/UL (ref 1–4.8)
LYMPHOCYTES NFR BLD: 17.9 % (ref 22–41)
MCH RBC QN AUTO: 31.8 PG (ref 27–33)
MCHC RBC AUTO-ENTMCNC: 34.2 G/DL (ref 33.7–35.3)
MCV RBC AUTO: 93 FL (ref 81.4–97.8)
MONOCYTES # BLD AUTO: 0.32 K/UL (ref 0–0.85)
MONOCYTES NFR BLD AUTO: 7.1 % (ref 0–13.4)
NEUTROPHILS # BLD AUTO: 3.25 K/UL (ref 1.82–7.42)
NEUTROPHILS NFR BLD: 71.9 % (ref 44–72)
NRBC # BLD AUTO: 0 K/UL
NRBC BLD-RTO: 0 /100 WBC
PLATELET # BLD AUTO: 111 K/UL (ref 164–446)
PMV BLD AUTO: 11.8 FL (ref 9–12.9)
POTASSIUM SERPL-SCNC: 3.8 MMOL/L (ref 3.6–5.5)
RBC # BLD AUTO: 3.99 M/UL (ref 4.7–6.1)
SODIUM SERPL-SCNC: 141 MMOL/L (ref 135–145)
WBC # BLD AUTO: 4.5 K/UL (ref 4.8–10.8)

## 2020-03-10 PROCEDURE — 99233 SBSQ HOSP IP/OBS HIGH 50: CPT | Performed by: PHYSICAL MEDICINE & REHABILITATION

## 2020-03-10 PROCEDURE — 97130 THER IVNTJ EA ADDL 15 MIN: CPT

## 2020-03-10 PROCEDURE — 80048 BASIC METABOLIC PNL TOTAL CA: CPT

## 2020-03-10 PROCEDURE — 770005 HCHG ROOM/CARE - REHAB PRIVATE (11*

## 2020-03-10 PROCEDURE — 36415 COLL VENOUS BLD VENIPUNCTURE: CPT

## 2020-03-10 PROCEDURE — 85025 COMPLETE CBC W/AUTO DIFF WBC: CPT

## 2020-03-10 PROCEDURE — 97530 THERAPEUTIC ACTIVITIES: CPT

## 2020-03-10 PROCEDURE — 700102 HCHG RX REV CODE 250 W/ 637 OVERRIDE(OP): Performed by: PHYSICAL MEDICINE & REHABILITATION

## 2020-03-10 PROCEDURE — A9270 NON-COVERED ITEM OR SERVICE: HCPCS | Performed by: PHYSICAL MEDICINE & REHABILITATION

## 2020-03-10 PROCEDURE — 97535 SELF CARE MNGMENT TRAINING: CPT

## 2020-03-10 PROCEDURE — 97129 THER IVNTJ 1ST 15 MIN: CPT

## 2020-03-10 RX ORDER — ARIPIPRAZOLE 10 MG/1
2.5 TABLET ORAL
Status: DISCONTINUED | OUTPATIENT
Start: 2020-03-10 | End: 2020-03-14 | Stop reason: HOSPADM

## 2020-03-10 RX ADMIN — MELATONIN 3 MG: at 20:31

## 2020-03-10 RX ADMIN — MELATONIN 2000 UNITS: at 08:14

## 2020-03-10 RX ADMIN — ALLOPURINOL 300 MG: 300 TABLET ORAL at 08:14

## 2020-03-10 RX ADMIN — ASPIRIN 325 MG: 325 TABLET, COATED ORAL at 08:14

## 2020-03-10 RX ADMIN — QUETIAPINE 25 MG: 25 TABLET ORAL at 01:19

## 2020-03-10 RX ADMIN — ARIPIPRAZOLE 2.5 MG: 10 TABLET ORAL at 20:31

## 2020-03-10 RX ADMIN — CHLORHEXIDINE GLUCONATE 0.12% ORAL RINSE 15 ML: 1.2 LIQUID ORAL at 20:31

## 2020-03-10 RX ADMIN — CHLORHEXIDINE GLUCONATE 0.12% ORAL RINSE 15 ML: 1.2 LIQUID ORAL at 08:14

## 2020-03-10 RX ADMIN — TRAZODONE HYDROCHLORIDE 50 MG: 50 TABLET ORAL at 20:31

## 2020-03-10 NOTE — REHAB-DIETARY IDT TEAM NOTE
Dietary   Nutrition  Dietary Problems     Problem: Inadequate nutrient intake     Description:     Goal: Patient to consume greater than or equal to 50% of meals     Description:                       Patient continues to eat poorly likely s/t altered mentation with being unaware of his surrounds and reality.  He is not sleeping well as well and MDs are adjusting medications.  Patient now with sitter.  Patient resting during time of RD eval.  Due to not sleeping well last night, I did not arouse him    PO< 50% of meals over past 72 hours on regular diet  Boost Plus added 3/9  Appetite: Poor    Pertinent Labs:WBC 4.5  Pertinent medications: noted     Weight: 76.2kg - stable but not different scales  Skin: CDI    Vitals: WNL  GI: BM today  : WNL  I/Os: +390mL x 24 hours    Plan: Liberalized diet.  Supplements.  Expect PO to improve with mentation.  If PO does not improve in next day or two many consider appetite stimulant.        Section completed by:  Maria G Davies R.D.

## 2020-03-10 NOTE — CARE PLAN
Problem: Safety  Goal: Will remain free from injury  Outcome: PROGRESSING SLOWER THAN EXPECTED  Note: Patient is impulsive and confused all day. Later on in the afternoon, he was agitated and wanting to leave. Dr Chase notified. UA ordered and collected. PRN seroquel and atarax given with little effect. Will continue to monitor.

## 2020-03-10 NOTE — ACP (ADVANCE CARE PLANNING)
Spoke with wife by phone. She wanted to confirm that discharge still set for Saturday, if patient would be kept due to his confusion. Wife does not want patient to go to skilled care. Patient's brother is coming Monday. Wife feels patient will do better at home.   Will request order for SW with home health care to assist wife with placement, if necessary and wife cannot manage patient at home.   I verified with Melissa in therapy scheduling that home visit is still scheduled for Thursday at 1PM. Informed wife. Transferred wife to nursing unit to obtain update on patient's status from RN.

## 2020-03-10 NOTE — REHAB-PHARMACY IDT TEAM NOTE
Pharmacy   Pharmacy  Antibiotics/Antifungals/Antivirals:  Medication:      Active Orders (From admission, onward)    None        Route:         n/a  Stop Date:  n/a  Reason:   Antihypertensives/Cardiac:  Medication:    Orders (72h ago, onward)     Start     Ordered    02/28/20 1518  hydrALAZINE (APRESOLINE) tablet 25 mg  EVERY 8 HOURS PRN      02/28/20 1520              Patient Vitals for the past 24 hrs:   BP Pulse   03/10/20 0700 129/67 99   03/09/20 1958 -- (!) 120   03/09/20 1900 147/78 (!) 123   03/09/20 1400 143/86 91     Anticoagulation:  Medication:  Aspirin  INR:      Other key medications: allopurinol, aripiprazole, eltrombopag  : A review of the medication list reveals no issues at this time.  Section completed by:  Ofelia Bowman [facility]

## 2020-03-10 NOTE — PROGRESS NOTES
"Rehab Progress Note     Date of Service: 3/10/2020  Chief Complaint: follow up stroke    Interval Events (Subjective)    Patient seen and examined today in his room.  Despite scheduled melatonin, trazodone, and the dose of Seroquel, he did not sleep until about 5 in the morning.  He was very sleepy this morning during therapy.  He continues to be very impulsive so one-to-one sitter has been ordered.  Yesterday afternoon he was quite agitated, demanding to leave.  Patient is much calmer today and has no complaints.    Objective:  VITAL SIGNS: /67   Pulse 99   Temp 36.3 °C (97.3 °F) (Temporal)   Resp 18   Ht 1.951 m (6' 4.81\")   Wt 76.2 kg (168 lb 1.6 oz)   SpO2 94%   BMI 20.03 kg/m²   Gen: alert, no apparent distress  CV: regular rate and rhythm, no murmurs, no peripheral edema  Resp: clear to ascultation bilaterally, normal respiratory effort  GI: soft, non-tender abdomen, bowel sounds present  Neuro: notable for sleepy, left visual field cut    Recent Results (from the past 72 hour(s))   CBC WITH DIFFERENTIAL    Collection Time: 03/09/20  6:27 AM   Result Value Ref Range    WBC 5.5 4.8 - 10.8 K/uL    RBC 4.40 (L) 4.70 - 6.10 M/uL    Hemoglobin 14.1 14.0 - 18.0 g/dL    Hematocrit 41.2 (L) 42.0 - 52.0 %    MCV 93.6 81.4 - 97.8 fL    MCH 32.0 27.0 - 33.0 pg    MCHC 34.2 33.7 - 35.3 g/dL    RDW 50.1 (H) 35.9 - 50.0 fL    Platelet Count 129 (L) 164 - 446 K/uL    MPV 11.6 9.0 - 12.9 fL    Neutrophils-Polys 72.60 (H) 44.00 - 72.00 %    Lymphocytes 18.60 (L) 22.00 - 41.00 %    Monocytes 6.10 0.00 - 13.40 %    Eosinophils 1.80 0.00 - 6.90 %    Basophils 0.50 0.00 - 1.80 %    Immature Granulocytes 0.40 0.00 - 0.90 %    Nucleated RBC 0.00 /100 WBC    Neutrophils (Absolute) 4.02 1.82 - 7.42 K/uL    Lymphs (Absolute) 1.03 1.00 - 4.80 K/uL    Monos (Absolute) 0.34 0.00 - 0.85 K/uL    Eos (Absolute) 0.10 0.00 - 0.51 K/uL    Baso (Absolute) 0.03 0.00 - 0.12 K/uL    Immature Granulocytes (abs) 0.02 0.00 - 0.11 K/uL    " NRBC (Absolute) 0.00 K/uL   URINALYSIS    Collection Time: 03/09/20  2:00 PM   Result Value Ref Range    Color DK Yellow     Character Turbid (A)     Specific Gravity 1.022 <1.035    Ph 5.0 5.0 - 8.0    Glucose Negative Negative mg/dL    Ketones Trace (A) Negative mg/dL    Protein Negative Negative mg/dL    Bilirubin Small (A) Negative    Urobilinogen, Urine 1.0 Negative    Nitrite Negative Negative    Leukocyte Esterase Small (A) Negative    Occult Blood Negative Negative    Micro Urine Req Microscopic    URINE MICROSCOPIC (W/UA)    Collection Time: 03/09/20  2:00 PM   Result Value Ref Range    WBC 0-2 (A) /hpf    RBC 0-2 (A) /hpf    Bacteria Negative None /hpf    Epithelial Cells Few /hpf    Mucous Threads Rare /hpf    Amorphous Crystal Present /hpf   Basic Metabolic Panel    Collection Time: 03/10/20  6:21 AM   Result Value Ref Range    Sodium 141 135 - 145 mmol/L    Potassium 3.8 3.6 - 5.5 mmol/L    Chloride 105 96 - 112 mmol/L    Co2 28 20 - 33 mmol/L    Glucose 117 (H) 65 - 99 mg/dL    Bun 14 8 - 22 mg/dL    Creatinine 0.89 0.50 - 1.40 mg/dL    Calcium 9.3 8.5 - 10.5 mg/dL    Anion Gap 8.0 0.0 - 11.9   CBC WITH DIFFERENTIAL    Collection Time: 03/10/20  6:21 AM   Result Value Ref Range    WBC 4.5 (L) 4.8 - 10.8 K/uL    RBC 3.99 (L) 4.70 - 6.10 M/uL    Hemoglobin 12.7 (L) 14.0 - 18.0 g/dL    Hematocrit 37.1 (L) 42.0 - 52.0 %    MCV 93.0 81.4 - 97.8 fL    MCH 31.8 27.0 - 33.0 pg    MCHC 34.2 33.7 - 35.3 g/dL    RDW 49.1 35.9 - 50.0 fL    Platelet Count 111 (L) 164 - 446 K/uL    MPV 11.8 9.0 - 12.9 fL    Neutrophils-Polys 71.90 44.00 - 72.00 %    Lymphocytes 17.90 (L) 22.00 - 41.00 %    Monocytes 7.10 0.00 - 13.40 %    Eosinophils 2.00 0.00 - 6.90 %    Basophils 0.90 0.00 - 1.80 %    Immature Granulocytes 0.20 0.00 - 0.90 %    Nucleated RBC 0.00 /100 WBC    Neutrophils (Absolute) 3.25 1.82 - 7.42 K/uL    Lymphs (Absolute) 0.81 (L) 1.00 - 4.80 K/uL    Monos (Absolute) 0.32 0.00 - 0.85 K/uL    Eos (Absolute) 0.09  0.00 - 0.51 K/uL    Baso (Absolute) 0.04 0.00 - 0.12 K/uL    Immature Granulocytes (abs) 0.01 0.00 - 0.11 K/uL    NRBC (Absolute) 0.00 K/uL   ESTIMATED GFR    Collection Time: 03/10/20  6:21 AM   Result Value Ref Range    GFR If African American >60 >60 mL/min/1.73 m 2    GFR If Non African American >60 >60 mL/min/1.73 m 2       Current Facility-Administered Medications   Medication Frequency   • aripiprazole (ABILIFY) tablet 2.5 mg QHS   • melatonin tablet 3 mg QHS   • traZODone (DESYREL) tablet 50 mg QHS   • chlorhexidine (PERIDEX) 0.12 % solution 15 mL BID   • senna-docusate (PERICOLACE or SENOKOT S) 8.6-50 MG per tablet 2 Tab BID PRN    And   • polyethylene glycol/lytes (MIRALAX) PACKET 1 Packet QDAY PRN    And   • magnesium hydroxide (MILK OF MAGNESIA) suspension 30 mL QDAY PRN    And   • bisacodyl (DULCOLAX) suppository 10 mg QDAY PRN   • QUEtiapine (SEROQUEL) tablet 25 mg Q8HRS PRN   • vitamin D (cholecalciferol) tablet 2,000 Units DAILY   • Respiratory Therapy Consult Continuous RT   • Pharmacy Consult Request ...Pain Management Review 1 Each PHARMACY TO DOSE   • acetaminophen (TYLENOL) tablet 650 mg Q4HRS PRN   • hydrALAZINE (APRESOLINE) tablet 25 mg Q8HRS PRN   • artificial tears ophthalmic solution 1 Drop PRN   • benzocaine-menthol (CEPACOL) lozenge 1 Lozenge Q2HRS PRN   • mag hydrox-al hydrox-simeth (MAALOX PLUS ES or MYLANTA DS) suspension 20 mL Q2HRS PRN   • ondansetron (ZOFRAN ODT) dispertab 4 mg 4X/DAY PRN    Or   • ondansetron (ZOFRAN) syringe/vial injection 4 mg 4X/DAY PRN   • sodium chloride (OCEAN) 0.65 % nasal spray 2 Spray PRN   • hydrOXYzine HCl (ATARAX) tablet 50 mg Q6HRS PRN   • lactulose 20 GM/30ML solution 30 mL QDAY PRN   • docusate sodium (ENEMEEZ) enema 283 mg QDAY PRN   • Eltrombopag Olamine TABS 50 mg DAILY   • allopurinol (ZYLOPRIM) tablet 300 mg DAILY   • aspirin EC (ECOTRIN) tablet 325 mg DAILY       Orders Placed This Encounter   Procedures   • Diet Order Regular     Standing  Status:   Standing     Number of Occurrences:   1     Order Specific Question:   Diet:     Answer:   Regular [1]       Assessment:  Active Hospital Problems    Diagnosis   • *Cerebrovascular accident (CVA) due to thrombosis of right posterior cerebral artery (HCC)   • Vitamin D deficiency   • Visual field cut   • Cognitive deficits   • Essential hypertension   • History of ITP   • History of gout     This patient is a 84 y.o. male admitted for acute inpatient rehabilitation with Cerebrovascular accident (CVA) due to thrombosis of right posterior cerebral artery (HCC).    I led and attended the weekly conference, and agree with the IDT conference documentation and plan of care as noted below.    Date of conference: 3/4/2020    Goals and barriers: See IDT note.    Biggest barriers: bladder incontinence, severe cognitive deficits, impulsive, poor insight into deficits, very impaired vision on the left    CM/social support: wife supportive, but likely can't physically assist    Anticipated DC date: 3/14/2020    Home health: PT/OT/SLP/RN    Equip: FWW    Follow up: PCP, rehab clinic       Medical Decision Making and Plan:    Right PCA stroke  Left homonymous hemianopsia  Left pronator drift  Dominant  Cognitive deficits, severe  Continue full rehab program  PT/OT/SLP, 1 hr each discipline, 5 days per week  Continue aspirin for secondary stroke prophylaxis  Statin not indicated, LDL 32    Encephalopathy  Agitation  Negative UA x 2, ordered culture   Melatonin, trazodone, seroquel not effective  Schedule low dose Abilify, in addition to melatonin and trazodone  Start 1:1 until he's less agitated     Halitosis  Continue Peridex, oral care    History of gout  Continue allopurinol    History of Immune thrombocytopenia  Continue home medication, Eltrombopag  Monitor labs, stable on recheck x 2    Normocytic anemia  Mild, stable    Vit D deficiency, 15  Continue supplementation    Hypokalemia, resolved  Given one dose of  potassium    Bowel  Constipation, resolved  As needed bowel meds  Last BM 3/8    Bladder  Check PVRs -59, 149, 50, 96  Not retaining  ICP for over 400 cc  Scheduled toileting   UA negative x 2    DVT prophylaxis  Discontinued Lovenox as he is ambulating long distances    Total time:  36 minutes.  I spent greater than 50% of the time for patient care, counseling, and coordination on this date, including patient face-to face time, unit/floor time with review of records/pertinent lab data and studies, as well as discussing diagnostic evaluation/work up, planned therapeutic interventions, and future disposition of care, as per the interval events/subjective and the assessment and plan as noted above.    I have performed a physical exam, reviewed and updated ROS, as well as the assessment and plan today 3/10/2020. In review of note from 3/9/2020 there are no new changes except as documented above.        Elba hCase M.D.   Physical Medicine and Rehabilitation

## 2020-03-10 NOTE — THERAPY
"Speech Language Pathology  Daily Treatment     Patient Name: Chilo Cabrera  Age:  84 y.o., Sex:  male  Medical Record #: [id number]  Today's Date: 3/10/2020     Subjective    \"im doing okay\"     Objective       03/10/20 1333   SLP Total Time Spent   SLP Individual Total Time Spent (Mins) 30   Charge Group   SLP Cognitive Skill Development First 15 Minutes 1   SLP Cognitive Skill Development Additional 15 Minutes 1       Assessment    O-log completed on this date with score of 14/30 noted. Pt with frequent request to use the restroom X2 during the session. Redirection provided as for pt had just used the restroom. MD assessing for infection with sample received.     Plan    Simple attention, orientation and functional ps    "

## 2020-03-10 NOTE — THERAPY
"Recreational Therapy  Daily Treatment     Patient Name: Chilo Cabrera  AGE:  84 y.o., SEX:  male  Medical Record #: [id number]  Today's Date: 3/10/2020       Subjective    \"Yes\" when asked throughout the session time if he was still eating.       Objective       03/10/20 0831   Interdisciplinary Plan of Care Collaboration   IDT Collaboration with  Certified Nursing Assistant   Patient Position at End of Therapy Seated   Collaboration Comments CNA seated with Pt in the cafeteria durign all of session time.    Treatment Time   Total Time Spent (mins) 0   Total Time Missed 30       Assessment    Pt remained in the cafeteria slowly eating his breakfast during the session time.     Plan    Speak with scheduling to have Pt blocked for therapy from 830 - 900 to avoid missed therapy time.   "

## 2020-03-10 NOTE — PROGRESS NOTES
"Rehab Progress Note     Date of Service: 3/9/2020  Chief Complaint: follow up stroke    Interval Events (Subjective)    Patient seen and examined today first in the hallway and then later in the day when his wife was present.  Patient very confused and more agitated today.  He has been sundowning at night and last night reported to staff that he busted up a drug deal and he needed to let the [employer] know.  He really wants to leave the hospital today and has no insight into his deficits.  Discussed plan of care with his wife at bedside to include looking for infectious causes as well as scheduling medications to help him sleep better.    Objective:  VITAL SIGNS: /86   Pulse 91   Temp 36.3 °C (97.3 °F) (Temporal)   Resp 18   Ht 1.951 m (6' 4.81\")   Wt 76.2 kg (168 lb 1.6 oz)   SpO2 94%   BMI 20.03 kg/m²   Gen: alert,mild distress  CV: regular rate and rhythm, no murmurs, no peripheral edema  Resp: clear to ascultation bilaterally, normal respiratory effort  GI: soft, non-tender abdomen, bowel sounds present  Neuro: notable for left sided visual field cut, disorientation, confusion    Recent Results (from the past 72 hour(s))   Basic Metabolic Panel    Collection Time: 03/07/20  7:05 AM   Result Value Ref Range    Sodium 140 135 - 145 mmol/L    Potassium 3.5 (L) 3.6 - 5.5 mmol/L    Chloride 105 96 - 112 mmol/L    Co2 28 20 - 33 mmol/L    Glucose 113 (H) 65 - 99 mg/dL    Bun 16 8 - 22 mg/dL    Creatinine 0.89 0.50 - 1.40 mg/dL    Calcium 9.4 8.5 - 10.5 mg/dL    Anion Gap 7.0 0.0 - 11.9   MAGNESIUM    Collection Time: 03/07/20  7:05 AM   Result Value Ref Range    Magnesium 1.9 1.5 - 2.5 mg/dL   ESTIMATED GFR    Collection Time: 03/07/20  7:05 AM   Result Value Ref Range    GFR If African American >60 >60 mL/min/1.73 m 2    GFR If Non African American >60 >60 mL/min/1.73 m 2   CBC WITH DIFFERENTIAL    Collection Time: 03/09/20  6:27 AM   Result Value Ref Range    WBC 5.5 4.8 - 10.8 K/uL    RBC 4.40 (L) 4.70 - " 6.10 M/uL    Hemoglobin 14.1 14.0 - 18.0 g/dL    Hematocrit 41.2 (L) 42.0 - 52.0 %    MCV 93.6 81.4 - 97.8 fL    MCH 32.0 27.0 - 33.0 pg    MCHC 34.2 33.7 - 35.3 g/dL    RDW 50.1 (H) 35.9 - 50.0 fL    Platelet Count 129 (L) 164 - 446 K/uL    MPV 11.6 9.0 - 12.9 fL    Neutrophils-Polys 72.60 (H) 44.00 - 72.00 %    Lymphocytes 18.60 (L) 22.00 - 41.00 %    Monocytes 6.10 0.00 - 13.40 %    Eosinophils 1.80 0.00 - 6.90 %    Basophils 0.50 0.00 - 1.80 %    Immature Granulocytes 0.40 0.00 - 0.90 %    Nucleated RBC 0.00 /100 WBC    Neutrophils (Absolute) 4.02 1.82 - 7.42 K/uL    Lymphs (Absolute) 1.03 1.00 - 4.80 K/uL    Monos (Absolute) 0.34 0.00 - 0.85 K/uL    Eos (Absolute) 0.10 0.00 - 0.51 K/uL    Baso (Absolute) 0.03 0.00 - 0.12 K/uL    Immature Granulocytes (abs) 0.02 0.00 - 0.11 K/uL    NRBC (Absolute) 0.00 K/uL       Current Facility-Administered Medications   Medication Frequency   • melatonin tablet 3 mg QHS   • traZODone (DESYREL) tablet 50 mg QHS   • chlorhexidine (PERIDEX) 0.12 % solution 15 mL BID   • senna-docusate (PERICOLACE or SENOKOT S) 8.6-50 MG per tablet 2 Tab BID PRN    And   • polyethylene glycol/lytes (MIRALAX) PACKET 1 Packet QDAY PRN    And   • magnesium hydroxide (MILK OF MAGNESIA) suspension 30 mL QDAY PRN    And   • bisacodyl (DULCOLAX) suppository 10 mg QDAY PRN   • QUEtiapine (SEROQUEL) tablet 25 mg Q8HRS PRN   • vitamin D (cholecalciferol) tablet 2,000 Units DAILY   • Respiratory Therapy Consult Continuous RT   • Pharmacy Consult Request ...Pain Management Review 1 Each PHARMACY TO DOSE   • acetaminophen (TYLENOL) tablet 650 mg Q4HRS PRN   • hydrALAZINE (APRESOLINE) tablet 25 mg Q8HRS PRN   • artificial tears ophthalmic solution 1 Drop PRN   • benzocaine-menthol (CEPACOL) lozenge 1 Lozenge Q2HRS PRN   • mag hydrox-al hydrox-simeth (MAALOX PLUS ES or MYLANTA DS) suspension 20 mL Q2HRS PRN   • ondansetron (ZOFRAN ODT) dispertab 4 mg 4X/DAY PRN    Or   • ondansetron (ZOFRAN) syringe/vial  injection 4 mg 4X/DAY PRN   • sodium chloride (OCEAN) 0.65 % nasal spray 2 Spray PRN   • hydrOXYzine HCl (ATARAX) tablet 50 mg Q6HRS PRN   • lactulose 20 GM/30ML solution 30 mL QDAY PRN   • docusate sodium (ENEMEEZ) enema 283 mg QDAY PRN   • Eltrombopag Olamine TABS 50 mg DAILY   • allopurinol (ZYLOPRIM) tablet 300 mg DAILY   • aspirin EC (ECOTRIN) tablet 325 mg DAILY       Orders Placed This Encounter   Procedures   • Diet Order Regular     Standing Status:   Standing     Number of Occurrences:   1     Order Specific Question:   Diet:     Answer:   Regular [1]       Assessment:  Active Hospital Problems    Diagnosis   • *Cerebrovascular accident (CVA) due to thrombosis of right posterior cerebral artery (HCC)   • Vitamin D deficiency   • Visual field cut   • Cognitive deficits   • Essential hypertension   • History of ITP   • History of gout     This patient is a 84 y.o. male admitted for acute inpatient rehabilitation with Cerebrovascular accident (CVA) due to thrombosis of right posterior cerebral artery (HCC).    I led and attended the weekly conference, and agree with the IDT conference documentation and plan of care as noted below.    Date of conference: 3/4/2020    Goals and barriers: See IDT note.    Biggest barriers: bladder incontinence, severe cognitive deficits, impulsive, poor insight into deficits, very impaired vision on the left    CM/social support: wife supportive, but likely can't physically assist    Anticipated DC date: 3/14/2020    Home health: PT/OT/SLP/RN    Equip: FWW    Follow up: PCP, rehab clinic       Medical Decision Making and Plan:    Right PCA stroke  Left homonymous hemianopsia  Left pronator drift  Dominant  Cognitive deficits, severe  Continue full rehab program  PT/OT/SLP, 1 hr each discipline, 5 days per week  Continue aspirin for secondary stroke prophylaxis  Statin not indicated, LDL 32    Encephalopathy  Agitation  Negative UA  Order PRN Seroquel for agitation/paranoia at  night, started to need over the weekend for increased confusion at night/agitation   Check UA again, first was negative  Schedule melatonin and trazodone tonight  May change to Abilify  EKG without prolonged qtc.  Suspect patient may have had underlying cognitive deficits prior? And now is sundowning     Halitosis  Cotninue Peridex, oral care    History of gout  Continue allopurinol    History of Immune thrombocytopenia  Continue home medication, Eltrombopag  Monitor labs, stable on recheck today    Normocytic anemia  Mild, stable    Vit D deficiency, 15  Continue supplementation    Hypokalemia  Given one dose of potassium  Recheck in am    Bowel  Constipation, resolved  As needed bowel meds  Last BM 3/8    Bladder  Check PVRs -59, 149, 50, 96  Not retaining  ICP for over 400 cc  Scheduled toileting     DVT prophylaxis  Discontinue Lovenox as he is ambulating long distances    Total time:  35 minutes.  I spent greater than 50% of the time for patient care, counseling, and coordination on this date, including patient face-to face time, unit/floor time with review of records/pertinent lab data and studies, as well as discussing diagnostic evaluation/work up, planned therapeutic interventions, and future disposition of care, as per the interval events/subjective and the assessment and plan as noted above.    I have performed a physical exam, reviewed and updated ROS, as well as the assessment and plan today 3/9/2020. In review of note from 3/6/2020 there are no new changes except as documented above.    Elba Chase M.D.   Physical Medicine and Rehabilitation

## 2020-03-10 NOTE — THERAPY
Occupational Therapy  Daily Treatment     Patient Name: Chilo Cabrera  Age:  84 y.o., Sex:  male  Medical Record #: [id number]  Today's Date: 3/10/2020     Precautions  Precautions: (P) Fall Risk, Other (See Comments)  Comments: (P) left hemianopsia, impulsive    Safety   ADL Safety : (P) Requires Supervision for Safety, Requires Physical Assist for Safety, Requires Cueing for Safety, Impaired Insight into Safety  Bathroom Safety: (P) Requires Supervision for Safety, Requires Physical Assist for Safety, Requires Cuing for Safety, Impaired Insight into Safety  Comments: (P) see FIMs for toileting and toilet transfer.    Subjective    Pt received in room with 1:1 sitter present. Pt very lethargic and unable to stay awake during session, despite attempts to complete fluidobike, walking and tabletop activities. Medical hold placed per Dr. Chase.       Objective       03/10/20 0931   Precautions   Precautions Fall Risk;Other (See Comments)   Comments left hemianopsia, impulsive   Safety    ADL Safety  Requires Supervision for Safety;Requires Physical Assist for Safety;Requires Cueing for Safety;Impaired Insight into Safety   Bathroom Safety Requires Supervision for Safety;Requires Physical Assist for Safety;Requires Cuing for Safety;Impaired Insight into Safety   Comments see FIMs for toileting and toilet transfer.   Cognition    Level of Consciousness Lethargic   Ability To Follow Commands Unable to Follow 1 Step Commands   Safety Awareness Impaired;Impulsive   Attention Impaired   Comments pt very lethargic this session. Unable to stay awake during activity, unable to follow 1 step cues.   Sitting Upper Body Exercises   Comments attempted fluidoBike, however pt unable to stay awake.   Interdisciplinary Plan of Care Collaboration   IDT Collaboration with  Nursing;Certified Nursing Assistant;Physician;Speech Therapist;Physical Therapist   Patient Position at End of Therapy Seated;Other (Comments)  (pt with CNA (now has  1:1 sitter for safety))   Collaboration Comments CLOF, POC, pt very lethargic and unable to participate.   Therapy Missed   Missed Therapy (Minutes) 30   Reason For Missed Therapy Medical - Patient on Hold from Therapy;Medical - Patient not Able To Participate  (pt very lethargic, unable to participate)   OT Total Time Spent   OT Individual Total Time Spent (Mins) 30   OT Charge Group   OT Self Care / ADL 1   OT Therapy Activity 1       FIM Toiletin - Minimal Assistance  Toileting Description:  Grab bar, Increased time, Supervision for safety, Verbal cueing, Set-up of equipment    FIM Toilet Transfer Score:  4 - Minimal Assistance  Toilet Transfer Description:  Grab bar, Increased time, Supervision for safety, Verbal cueing, Set-up of equipment(CGA for w/c<>toilet SPT wtih GB.)      Assessment    Pt very lethargic this session and unable to participate despite multiple attempts and encouragement. Per nursing, pt was up all night and very agitated. Medical hold placed per Dr. Chase.     Plan    Cont'd OT for neuro re-education, vision, balance, strength, endurance, education for safe ADL's and transfers. Home evaluation scheduled Thurs 3/12 at 1 pm.

## 2020-03-10 NOTE — REHAB-CM IDT TEAM NOTE
Case Management    DC Planning  DC destination/dispostion:  To single level home with two step entry (no rails) and two interior steps from kitchen to family room (no rails and narrow), with support of wife. Patient's brother from [place] will install needed grab bars and arrive from [place] on Monday, March 16, 2020 to assist with transition to home.    Referrals: [facility] - RN/PT/OT/ST/SW. Ordering FWW.    DC Needs: MD f/u appointments - PCP, rehab clinic.    Barriers to discharge: Functional and cognitive deficits.       Strengths: Supportive wife.     Section completed by:  Monika Tellez R.N.

## 2020-03-10 NOTE — CARE PLAN
Problem: Communication  Goal: The ability to communicate needs accurately and effectively will improve  Outcome: PROGRESSING SLOWER THAN EXPECTED  Note: Pt mumbles and is difficult to understand. Pt will need extra time to communicate his needs to staff effectively.     Problem: Safety  Goal: Will remain free from injury  Outcome: PROGRESSING SLOWER THAN EXPECTED  Note: Pt is very impulsive with alarms in place for safety.     Problem: Respiratory:  Goal: Respiratory status will improve  Outcome: PROGRESSING AS EXPECTED  Note: Pt is on room air and respirations are WNL.      Problem: Pain Management  Goal: Pain level will decrease to patient's comfort goal  Outcome: PROGRESSING AS EXPECTED  Note: No reports of pain at this time. Will continue to monitor through shift with hourly rounds.

## 2020-03-10 NOTE — THERAPY
Physical Therapy   Daily Treatment     Patient Name: Chilo Cabrera  Age:  84 y.o., Sex:  male  Medical Record #: [id number]  Today's Date: 3/10/2020     Precautions  Precautions: Fall Risk, Other (See Comments)  Comments: left hemianopsia, impulsive    Subjective    Pt reports he has to pee     Objective       03/10/20 1401   Interdisciplinary Plan of Care Collaboration   IDT Collaboration with  Nursing;Physician   Patient Position at End of Therapy Seated;Chair Alarm On;Self Releasing Lap Belt Applied  (handoff 1:1 CNA)   Collaboration Comments Discussed pt burning and constant request to pee throught session as well as loose BMs   PT Total Time Spent   PT Individual Total Time Spent (Mins) 60   PT Charge Group   PT Therapeutic Activities 4       FIM Toiletin - Minimal Assistance  Toileting Description:  Grab bar, Increased time, Verbal cueing, Adaptive equipment    FIM Toilet Transfer Score:  4 - Minimal Assistance  Toilet Transfer Description:  Grab bar, Increased time, Verbal cueing, Adaptive equipment      Assessment    Pt limited this session by constant request to urinate despite finishing peeing less than 5 min before. Pt did have two loose BMs and an accident at the beginning of the session.     Plan    Walk FWW, Vector no AD, lite gait for increased speed, dynamic balance challenges, navigating tight spaces/home environment with FWW, L attention and LLE forced use, family training as able, progress to SPV level mobility to be able to DC to home, home eval 3/12?

## 2020-03-11 ENCOUNTER — HOME HEALTH ADMISSION (OUTPATIENT)
Dept: HOME HEALTH SERVICES | Facility: HOME HEALTHCARE | Age: 85
End: 2020-03-11
Payer: MEDICARE

## 2020-03-11 PROCEDURE — 97112 NEUROMUSCULAR REEDUCATION: CPT

## 2020-03-11 PROCEDURE — 97129 THER IVNTJ 1ST 15 MIN: CPT

## 2020-03-11 PROCEDURE — 700102 HCHG RX REV CODE 250 W/ 637 OVERRIDE(OP): Performed by: PHYSICAL MEDICINE & REHABILITATION

## 2020-03-11 PROCEDURE — 770005 HCHG ROOM/CARE - REHAB PRIVATE (11*

## 2020-03-11 PROCEDURE — A9270 NON-COVERED ITEM OR SERVICE: HCPCS | Performed by: PHYSICAL MEDICINE & REHABILITATION

## 2020-03-11 PROCEDURE — 97530 THERAPEUTIC ACTIVITIES: CPT

## 2020-03-11 PROCEDURE — 97116 GAIT TRAINING THERAPY: CPT

## 2020-03-11 PROCEDURE — 97130 THER IVNTJ EA ADDL 15 MIN: CPT

## 2020-03-11 PROCEDURE — 97535 SELF CARE MNGMENT TRAINING: CPT

## 2020-03-11 PROCEDURE — 97110 THERAPEUTIC EXERCISES: CPT

## 2020-03-11 PROCEDURE — 99233 SBSQ HOSP IP/OBS HIGH 50: CPT | Performed by: PHYSICAL MEDICINE & REHABILITATION

## 2020-03-11 RX ORDER — SULFAMETHOXAZOLE AND TRIMETHOPRIM 800; 160 MG/1; MG/1
1 TABLET ORAL EVERY 12 HOURS
Status: DISCONTINUED | OUTPATIENT
Start: 2020-03-11 | End: 2020-03-13

## 2020-03-11 RX ADMIN — ASPIRIN 325 MG: 325 TABLET, COATED ORAL at 08:29

## 2020-03-11 RX ADMIN — ARIPIPRAZOLE 2.5 MG: 10 TABLET ORAL at 19:57

## 2020-03-11 RX ADMIN — SULFAMETHOXAZOLE AND TRIMETHOPRIM 1 TABLET: 800; 160 TABLET ORAL at 19:56

## 2020-03-11 RX ADMIN — CHLORHEXIDINE GLUCONATE 0.12% ORAL RINSE 15 ML: 1.2 LIQUID ORAL at 08:29

## 2020-03-11 RX ADMIN — MELATONIN 3 MG: at 19:56

## 2020-03-11 RX ADMIN — TRAZODONE HYDROCHLORIDE 50 MG: 50 TABLET ORAL at 19:56

## 2020-03-11 RX ADMIN — SULFAMETHOXAZOLE AND TRIMETHOPRIM 1 TABLET: 800; 160 TABLET ORAL at 16:20

## 2020-03-11 RX ADMIN — CHLORHEXIDINE GLUCONATE 0.12% ORAL RINSE 15 ML: 1.2 LIQUID ORAL at 20:03

## 2020-03-11 RX ADMIN — MELATONIN 2000 UNITS: at 08:29

## 2020-03-11 RX ADMIN — ALLOPURINOL 300 MG: 300 TABLET ORAL at 08:29

## 2020-03-11 NOTE — REHAB-CM IDT TEAM NOTE
Case Management    DC Planning  DC destination/dispostion:  Home with wife    Referrals: Home Health ([facility]), [facility]     DC Needs: Home health and DME    Barriers to discharge:  Functional and cognitive deficits      Strengths: PLOF - , independent, supportive wife and brother    Section completed by:  Betzaida Chávez

## 2020-03-11 NOTE — THERAPY
"Occupational Therapy  Daily Treatment     Patient Name: Chilo Cabrera  Age:  84 y.o., Sex:  male  Medical Record #: [id number]  Today's Date: 3/11/2020     Precautions  Precautions: (P) Fall Risk, Other (See Comments)  Comments: (P) left hemianopsia, impulsive    Safety   ADL Safety : (P) Requires Supervision for Safety, Impaired Insight into Safety, Requires Cueing for Safety  Bathroom Safety: (P) Requires Supervision for Safety, Impaired Insight into Safety, Requires Cuing for Safety  Comments: (P) see FIMs for ADL performance details.    Subjective    \"I slept hard last night\"     Objective       03/11/20 0701   Precautions   Precautions Fall Risk;Other (See Comments)   Comments left hemianopsia, impulsive   Safety    ADL Safety  Requires Supervision for Safety;Impaired Insight into Safety;Requires Cueing for Safety   Bathroom Safety Requires Supervision for Safety;Impaired Insight into Safety;Requires Cuing for Safety   Comments see FIMs for ADL performance details.   Cognition    Level of Consciousness Alert   Ability To Follow Commands 2 Step   Safety Awareness Impaired;Impulsive   Attention Impaired   Balance   Sitting Balance (Static) Fair +   Sitting Balance (Dynamic) Fair +   Standing Balance (Static) Fair   Standing Balance (Dynamic) Fair -   Weight Shift Sitting Fair   Weight Shift Standing Fair   Skilled Intervention Verbal Cuing   Comments observed during ADLs and bathroom transfers.   Bed Mobility    Supine to Sit Supervised   Scooting Supervised   Rolling Supervised   Skilled Intervention Verbal Cuing   Interdisciplinary Plan of Care Collaboration   IDT Collaboration with  Nursing   Patient Position at End of Therapy Seated;Chair Alarm On;Self Releasing Lap Belt Applied;Other (Comments)  (seated in cafeteria for breakfast)   Collaboration Comments CLOF, POC   OT Total Time Spent   OT Individual Total Time Spent (Mins) 60   OT Charge Group   OT Self Care / ADL 4       FIM Eating Score:  5 - Standby " Prompting/Supervision or Set-up  Eating Description:  Increased time, Supervision for safety    FIM Grooming Score:  5 - Standby Prompting/Supervision or Set-up  Grooming Description:  Increased time, Supervision for safety, Verbal cueing, Set-up of equipment(SBA seated at sink for shaving and combing hair.)    FIM Bathing Score:  4 - Minimal Assistance  Bathing Description:       FIM Upper Body Dressin - Standby Prompting/Supervision or Set-up  Upper Body Dressing Description:  Increased time, Supervision for safety, Verbal cueing, Set-up of equipment(SBA for orientation of shirt.)    FIM Lower Body Dressing Score:  5 - Standby Prompting/Supervsion or Set-up  Lower Body Dressing Description:  Increased time, Supervision for safety, Verbal cueing, Set-up of equipment(SBA/set up to don/doff brief, pants, socks and shoes with tie.)    FIM Toiletin - Standby Prompting/Supervision or Set-up  Toileting Description:  Grab bar, Increased time, Supervision for safety, Verbal cueing, Set-up of equipment    FIM Toilet Transfer Score:  5 - Standby Prompting/Supervision or Set-up  Toilet Transfer Description:  Grab bar, Increased time, Supervision for safety, Verbal cueing, Set-up of equipment(w/c<>toilet SPT with GB and close SBA.)    FIM Tub/Shower Transfers Score:  5 - Standby Prompting/Supervision or Set-up  Tub/Shower Transfers Description:  Grab bar, Increased time, Supervision for safety, Verbal cueing, Set-up of equipment(per CNA report from 3/10: requires use of GB and close SBA for w/c<>shower bench SPT.)      Assessment    Pt tolerated session well and more alert in comparison to yesterday's session. Pt con't to be impulsive and requires CGA-close SBA during ADLs. Barriers include impaired balance, decreased insight into deficits, L visual field cut.     Plan    Cont'd OT for neuro re-education, vision, balance, strength, endurance, education for safe ADL's and transfers. Home evaluation scheduled Thurs  3/12 at 1 pm.

## 2020-03-11 NOTE — THERAPY
"Occupational Therapy  Daily Treatment     Patient Name: Chilo Cabrera  Age:  84 y.o., Sex:  male  Medical Record #: [id number]  Today's Date: 3/11/2020     Precautions  Precautions: (P) Fall Risk, Other (See Comments)  Comments: (P) left hemianopsia, impulsive, 1:1 sitter    Safety   ADL Safety : Requires Supervision for Safety, Impaired Insight into Safety, Requires Cueing for Safety  Bathroom Safety: Requires Supervision for Safety, Impaired Insight into Safety, Requires Cuing for Safety  Comments: see FIMs for ADL performance details.    Subjective    \"Did my daughter have her baby yet?\"      Objective       03/11/20 0931   Precautions   Precautions Fall Risk;Other (See Comments)   Comments left hemianopsia, impulsive, 1:1 sitter   Sitting Lower Body Exercises   Nustep Resistance Level 4  (15 minutes for general endurance and L side strength)   OT Total Time Spent   OT Individual Total Time Spent (Mins) 30   OT Charge Group   OT Therapy Activity 2     Assessment    Pt tolerated session well, demos impaired attention frequently stopping activity to talk to therapist and requiring cues to re-initiate. Required cues to locate chair for transfer when placed on left side.     Plan    Cont'd OT for neuro re-education, vision, balance, strength, endurance, education for safe ADL's and transfers. Home evaluation scheduled Thurs 3/12 at 1 pm.    "

## 2020-03-11 NOTE — THERAPY
"Recreational Therapy  Daily Treatment     Patient Name: Chilo Cabrera  AGE:  84 y.o., SEX:  male  Medical Record #: [id number]  Today's Date: 3/11/2020       Subjective    \"I'm a little off today.\" Pt was given encouragement when he required verbal cueing during the activity.      Objective       03/11/20 1001   Functional Ability Status - Cognitive   Attention Span Remains on Task with Cueing   Comprehension Follows One Step Commands;Requires Cueing   Judgment Needs Assistance;Impaired;Confused   Cognitive Comments Mod verbal cues   Functional Ability Status - Emotional    Affect Flat   Mood Appropriate   Behavior Appropriate   Skilled Intervention    Skilled Intervention Cognitive Leisure   Skilled Intervention Comments Placing shapes in correct lined template   Interdisciplinary Plan of Care Collaboration   IDT Collaboration with  Physician   Patient Position at End of Therapy Seated;Call Light within Reach;Tray Table within Reach   Collaboration Comments Physician spoke with Pt jennifer dumont. CLOF   Treatment Time   Total Time Spent (mins) 30   Procedural Tracking   Procedural Tracking Cognitive Skills Training       Assessment    Pt required Mod verbal cues to look left across his L visual field cut. Pts mood seemed to be more positive however did speak of a horse that was going to be born. He stated that he could see the field from his room in this hospital. There is no pasture near the hospital or horses.     Plan    Cognitive leisure and problem solving.   "

## 2020-03-11 NOTE — CARE PLAN
Problem: Mobility  Goal: STG-Within one week, patient will ambulate up/down a curb  Description: 1) Individualized goal:  With FWW and CGA.  2) Interventions:  PT Group Therapy, PT Gait Training, PT Therapeutic Exercises, PT Neuro Re-Ed/Balance, PT Therapeutic Activity and PT Evaluation   Outcome: NOT MET  Note: NT dt focus on other goals      Problem: Mobility  Goal: STG-Within one week, patient will ambulate household distance  Description: 1) Individualized goal:  150 ft with FWW and SBA.  2) Interventions:  PT Group Therapy, PT Gait Training, PT Therapeutic Exercises, PT Neuro Re-Ed/Balance, PT Therapeutic Activity and PT Evaluation     Outcome: PROGRESSING AS EXPECTED  Note: CGA - min A  Goal: STG-Within one week, patient will ascend and descend four to six stairs  Description: 1) Individualized goal:  With B rails and CGA.  2) Interventions:  PT Group Therapy, PT Gait Training, PT Therapeutic Exercises, PT Neuro Re-Ed/Balance, PT Therapeutic Activity and PT Evaluation     Outcome: PROGRESSING AS EXPECTED  Note: Min A for steadying      Problem: Mobility Transfers  Goal: STG-Within one week, patient will transfer bed to chair  Description: 1) Individualized goal:  With FWW and SBA.  2) Interventions: PT Group Therapy, PT Gait Training, PT Therapeutic Exercises, PT Neuro Re-Ed/Balance, PT Therapeutic Activity and PT Evaluation   Outcome: MET

## 2020-03-11 NOTE — REHAB-ACTIVITY IDT TEAM NOTE
ACT   Recreation/Community     Leisure Competence Measure  Leisure Awareness: Moderate Assist  Leisure Attitude: Moderate Assist  Leisure Skills: Moderate Assist  Cultural / Social Behaviors: Modified Independent  Community Integration Skills: Moderate Assist  Community Participation: Moderate Assist    Strengths:  Supportive family  Barriers:  Agitation, Confused, Decreased endurance, Generalized weakness, Poor activity tolerance, Poor carryover of learning and Poor insight/denial of deficits  # of short term goals set=2  # of short term goals met=1    Pt has had poor insight and confusion. I last session Pt was aggreable to treatment however did speak on wanting to watch a horse giving birth in the field. Pt was asked where the field was and he stated it was behind the hospital. Mod verbal cues for left side neglect. Unable to share on any activities he would like to return to following discharge. Hyper focused on discharge.     Recreation Therapy Problems     Problem: Recreation Therapy     Dates: Start: 03/04/20       Description:     Goal: STG-Within one week, patient will demonstrate leisure problem solving     Dates: Start: 03/04/20       Description: By sharing on two leisure activities he would like to participate in either during sessions or during his free time and any perceived barriers to his participation.            Goal: LTG-By discharge, patient will demonstrate leisure problem solving     Dates: Start: 03/04/20       Description: By sharing on two leisure activities he would like to participate in following discharge and any perceived barriers to his participation.            Goal: LTG-By discharge, patient will     Dates: Start: 03/04/20       Description: Complete a cognitive leisure activity with 80% accuracy with Min verbal cues.                        Section completed by:  JUDY LaoS

## 2020-03-11 NOTE — REHAB-OT IDT TEAM NOTE
Occupational Therapy   Activities of Daily Living  Eating Initial:  2 - Max Assistance  Eating Current:  5 - Standby Prompting/Supervision or Set-up   Eating Description:  Increased time, Supervision for safety  Grooming Initial:  5 - Standby Prompting/Supervision or Set-up  Grooming Current:  5 - Standby Prompting/Supervision or Set-up   Grooming Description:  Increased time, Supervision for safety, Verbal cueing, Set-up of equipment(SBA seated at sink for shaving and combing hair.)  Bathing Initial:  4 - Minimal Assistance  Bathing Current:  4 - Minimal Assistance   Bathing Description:  Grab bar, Tub bench, Hand held shower, Increased time, Supervision for safety, Verbal cueing, Set-up of equipment(per CNA report from 3/10: requires CGA-SBA and cues for safety.)  Upper Body Dressing Initial:  5 - Standby Prompting/Supervision or Set-up  Upper Body Dressing Current:  5 - Standby Prompting/Supervision or Set-up   Upper Body Dressing Description:  Increased time, Supervision for safety, Verbal cueing, Set-up of equipment(SBA for orientation of shirt.)  Lower Body Dressing Initial:  3 - Moderate Assistance  Lower Body Dressing Current:  5 - Standby Prompting/Supervsion or Set-up   Lower Body Dressing Description:  5 - Standby Prompting/Supervsion or Set-up  Toileting Initial:  4 - Minimal Assistance  Toileting Current:  5 - Standby Prompting/Supervision or Set-up   Toileting Description:  Grab bar, Increased time, Supervision for safety, Verbal cueing, Set-up of equipment  Toilet Transfer Initial:  4 - Minimal Assistance  Toilet Transfer Current:  5 - Standby Prompting/Supervision or Set-up   Toilet Transfer Description:  5 - Standby Prompting/Supervision or Set-up  Tub / Shower Transfer Initial:  4 - Minimal Assistance  Tub / Shower Transfer Current:  5 - Standby Prompting/Supervision or Set-up   Tub / Shower Transfer Description:  Grab bar, Increased time, Supervision for safety, Verbal cueing, Set-up of  equipment(per CNA report from 3/10: requires use of GB and close SBA for w/c<>shower bench SPT.)  IADL:  TBD.   Family Training/Education:  Ongoing with pt and spouse: safety with ADLs and bathroom transfers, bathroom DME, home safety and fall prevention, CVA recovery, compensatory strategies for visual field cut.    DME/DC Recommendations:  Home health OT, 24/7 supervision; pt will require tub transfer bench and GB near toilet and shower for safety.      Strengths:  Independent PLOF, Pleasant and cooperative, Supportive family and Willingly participates in therapeutic activities  Barriers:  Confused, Decreased endurance, Generalized weakness, Impulsive, Poor activity tolerance, Poor balance, Poor insight/denial of deficits, Poor sleep pattern and Other: left visual field cut     # of short term goals set= 3    # of short term goals met= 2     Occupational Therapy Goals     Problem: Bathing     Dates: Start: 03/04/20       Description:     Goal: STG-Within one week, patient will bathe     Dates: Start: 03/04/20       Description: 1) Individualized Goal: With supervision and verbal cues,AE/ DME prn.  2) Interventions: OT Orthotics Training, OT E Stim Attended, OT Group Therapy, OT Self Care/ADL, OT Cognitive Skill Dev, OT Community Reintegration, OT Manual Ther Technique, OT Neuro Re-Ed/Balance, OT Sensory Int Techniques, OT Therapeutic Activity, OT Ultrasound, OT Evaluation and OT Therapeutic Exercise        Note:     Goal Note filed on 03/11/20 0805 by Tiana Kirby, OT    Con't to require intermittent CGA for safety d/t impaired vision and balance.                        Problem: OT Long Term Goals     Dates: Start: 02/29/20       Description:     Goal: LTG-By discharge, patient will complete basic self care tasks     Dates: Start: 02/29/20   Expected End: 03/21/20       Description: 1) Individualized Goal:  With mod I  2) Interventions:  OT Orthotics Training, OT E Stim Attended, OT Group Therapy, OT Self  Care/ADL, OT Cognitive Skill Dev, OT Community Reintegration, OT Manual Ther Technique, OT Neuro Re-Ed/Balance, OT Sensory Int Techniques, OT Therapeutic Activity, OT Ultrasound, OT Evaluation and OT Therapeutic Exercise           Goal: LTG-By discharge, patient will complete basic home management     Dates: Start: 02/29/20   Expected End: 03/21/20       Description: 1) Individualized Goal:  With min A  2) Interventions:  OT Orthotics Training, OT E Stim Attended, OT Group Therapy, OT Self Care/ADL, OT Cognitive Skill Dev, OT Community Reintegration, OT Manual Ther Technique, OT Neuro Re-Ed/Balance, OT Sensory Int Techniques, OT Therapeutic Activity, OT Ultrasound, OT Evaluation and OT Therapeutic Exercise                       Section completed by:  Tiana Kirby, OT

## 2020-03-11 NOTE — THERAPY
"Speech Language Pathology  Daily Treatment     Patient Name: Chilo Cabrera  Age:  84 y.o., Sex:  male  Medical Record #: [id number]  Today's Date: 3/11/2020     Subjective    \"The stuff I can't remember, will it come back?\" Pt with emerging awareness of deficits this date as it pertains to memory, orientation.      Objective     03/11/20 1304   Cognition   Attention to Task Moderate (3)   Simple Attention Moderate (3)   Interdisciplinary Plan of Care Collaboration   IDT Collaboration with  Certified Nursing Assistant;Family / Caregiver   Patient Position at End of Therapy Seated;Self Releasing Lap Belt Applied   Collaboration Comments assumed care of pt from spouse in room. Spouse did not remain for session. Transfer of care to CNA to transfer to bed   SLP Total Time Spent   SLP Individual Total Time Spent (Mins) 30   Charge Group   SLP Cognitive Skill Development First 15 Minutes 1   SLP Cognitive Skill Development Additional 15 Minutes 1       Assessment    Simple attention targeted this session using card sorting task. Pt instructed to sort by color, required MAX cues with colors on post its to aid attention and comprehension of instructions. Pt 75% accuracy this date. Frequent errors noted at start of task with accuracy increasing with repetitions.     Plan    Continue to target simple attention/orientation    "

## 2020-03-11 NOTE — CARE PLAN
Problem: Communication  Goal: The ability to communicate needs accurately and effectively will improve  Outcome: PROGRESSING SLOWER THAN EXPECTED  Note: Pt mumbles and is hard to understand. Pt will need extra time to communicate his needs to staff effectively.     Problem: Safety  Goal: Will remain free from injury  Outcome: PROGRESSING SLOWER THAN EXPECTED  Note: 1:1 care with CNA for safety. Pt is impulsive.     Problem: Infection  Goal: Will remain free from infection  Outcome: PROGRESSING AS EXPECTED  Note: No s/s of infection present      Problem: Venous Thromboembolism (VTW)/Deep Vein Thrombosis (DVT) Prevention:  Goal: Patient will participate in Venous Thrombosis (VTE)/Deep Vein Thrombosis (DVT)Prevention Measures  Note: Pt receives Lovenox SC injections for DVT prophylaxis

## 2020-03-11 NOTE — REHAB-SLP IDT TEAM NOTE
Speech Therapy   Cognitive Linquistic Functions  Comprehension Initial:  5 - Stand-by Prompting/Supervision or Set-up  Comprehension Current:  5 - Stand-by Prompting/Supervision or Set-up   Comprehension Description:  Glasses, Verbal cues  Expression Initial:  5 - Stand-by Prompting/Supervision or Set-up  Expression Current:  5 - Stand-by Prompting/Supervision or Set-up   Expression Description:  Verbal cueing  Social Interaction Initial:  5 - Stand-by Prompting/Supervision or Set-up  Social Interaction Current:  5 - Stand-by Prompting/Supervision or Set-up   Social Interaction Description:  Increased time, Medication, Verbal cues  Problem Solving Initial:  4 - Minimal Assistance  Problem Solving Current:  2 - Max Assistance   Problem Solving Description:  Verbal cueing, Increased time, Therapy schedule, Seat belt, Bed/chair alarm  Memory Initial:  2 - Max Assistance  Memory Current:  2 - Max Assistance   Memory Description:  Verbal cueing, Seat belt, Therapy schedule, Bed/chair alarm  Executive Functioning / Organization Initial:     Executive Functioning / Organization Current: 2 - Max Assistance      Executive Functioning Desciption: per spouse pt independent with meds prior. Will require support at home for IADLs at this time.   Swallowing  Swallowing Status: Not following for dysphagia   Orders Placed This Encounter   Procedures   • Diet Order Regular     Standing Status:   Standing     Number of Occurrences:   1     Order Specific Question:   Diet:     Answer:   Regular [1]     Behavior Modification Plan  Keep the environment simple to avoid over stimulatiom/agitation, Allow for rest time, Keep instructions simple/concrete, Redirect to task/topic, Provide reasonable choices, Decrease the chance of failure by offering activities that are within the patient's abilities, Analyze tasks (break down into smaller steps) and Reinforce participation in desired tasks  Assistive Technology  Low/Impaired vision equipment  and Low tech: Calendar, planner, schedule, alarms/timers, pill organizer, post-it notes, lists  Family Training/Education:  Initiated with spouse, will remain on going  DC Recommendations: Limited progress, primary SLP recommending transition to SNF as pt will require close 24-7 supervision and ongoing therapy services  Strengths:  Independent PLOF and Supportive family  Barriers:  Confused, Decreased endurance, Generalized weakness, Impulsive, Poor activity tolerance, Poor carryover of learning, Poor insight/denial of deficits and Other: STM, left inattention  # of short term goals set=2  # of short term goals met=0  Speech Therapy Problems     Problem: Memory STGs     Dates: Start: 02/29/20       Description:     Goal: STG-Within one week, patient will     Dates: Start: 02/29/20       Description: 1) Individualized goal:  Utilize external memory aids for orientation to date and daily therapy activities with 80% acc and MOD A.   2) Interventions:  SLP Self Care / ADL Training , SLP Cognitive Skill Development and SLP Group Treatment       Note:     Goal Note filed on 03/04/20 0758 by Tia Vallecillo MS,CCC-SLP    O- log improved to 16/20 yesterday, compared to 12/20 in session previous to that.                        Problem: Problem Solving STGs     Dates: Start: 02/29/20       Description:     Goal: STG-Within one week, patient will     Dates: Start: 02/29/20       Description: 1) Individualized goal:  Complete basic single target scanning activities with 80% accuracy and MIN A.   2) Interventions:  SLP Self Care / ADL Training , SLP Cognitive Skill Development and SLP Group Treatment       Note:     Goal Note filed on 03/04/20 6046 by Tia Vallecillo MS,CCC-SLP    To be addressed.                        Problem: Speech/Swallowing LTGs     Dates: Start: 02/29/20       Description:     Goal: LTG-By discharge, patient will     Dates: Start: 02/29/20       Description: 1) Individualized goal:  Solve basic to  moderate problems related to health and safety using external memory aids when needed with 80% accuracy and MIN A.   2) Interventions:  SLP Self Care / ADL Training  and SLP Cognitive Skill Development, Group Treatment                          Section completed by:  Itzel Gonzalez MS,CCC-SLP

## 2020-03-11 NOTE — THERAPY
Physical Therapy   Daily Treatment     Patient Name: Chilo Cabrera  Age:  84 y.o., Sex:  male  Medical Record #: [id number]  Today's Date: 3/11/2020     Precautions  Precautions: Fall Risk, Other (See Comments)  Comments: left hemianopsia, impulsive, 1:1 sitter    Subjective    Pt sleeping but agreeable to therapy      Objective       03/11/20 1431   Sitting Lower Body Exercises   Nustep Resistance Level 3  (12 min, .17 miles, 26 SPM, 311 stpes, VCs for pacing)   Bed Mobility    Supine to Sit Supervised   Sit to Stand Supervised   Scooting Supervised   Rolling Supervised   Interdisciplinary Plan of Care Collaboration   Patient Position at End of Therapy Seated;Call Light within Reach;Tray Table within Reach;Chair Alarm On;Self Releasing Lap Belt Applied   PT Total Time Spent   PT Individual Total Time Spent (Mins) 60   PT Charge Group   PT Gait Training 1   PT Therapeutic Exercise 1   PT Neuromuscular Re-Education / Balance 1   PT Therapeutic Activities 1       FIM Walking Score:  4 - Minimal Assistance  Walking Description:  Walker, Extra time, Safety concerns, Verbal cueing, Requires incidental assist(CGA - FWW, 250 ft, constant max cues for direction and avoiding obstacles)    L attention task - asking pt to put on shoes and lace them. Pt requires cues to find L shoe but able to don and lace without assist - only cuing    Assessment    Pt demos continued need for assist with walking dt poor vision and consistent running into obstacles.     Plan    Walk FWW, Vector no AD, lite gait for increased speed, dynamic balance challenges, navigating tight spaces/home environment with FWW, L attention and LLE forced use, family training as able, progress to SPV level mobility to be able to DC to home,

## 2020-03-11 NOTE — CARE PLAN
Problem: Safety  Goal: Will remain free from injury  Note: Patient is impulsive and has tried to self-transfer today a couple times, patient close to the nurses station with alarms on bed and w/c, frequent reminders to use the call light and frequent rounding to make sure needs are being met.     Problem: Infection  Goal: Will remain free from infection  Note: Patient has positive urine culture, started on bactrim, will continue to monitor.

## 2020-03-11 NOTE — REHAB-PT IDT TEAM NOTE
Physical Therapy   Mobility  Bed mobility:   SPV  Bed /Chair/Wheelchair Transfer Initial:  4 - Minimal Assistance  Bed /Chair/Wheelchair Transfer Current:  5 - Standby Prompting/Supervision or Set-up   Bed/Chair/Wheelchair Transfer Description:  Supervision for safety, Verbal cueing, Increased time, Adaptive equipment  Walk Initial:  4 - Minimal Assistance  Walk Current:  4 - Minimal Assistance   Walk Description:  Walker, Extra time, Safety concerns, Verbal cueing, Requires incidental assist(250 ft, constant cues for avoiding objects and directionas well as posture and FWW management )  Wheelchair Initial:  2 - Max Assistance  Wheelchair Current:  4 - Minimal Assistance   Wheelchair Description:  Extra time, Adaptive equipment, Requires incidental assist, Safety concerns, Verbal cueing  Stairs Initial:  0 - Not tested,unsafe activity  Stairs Current: 4 - Minimal Assistance   Stairs Description: Ascends/descends 12 to 14 steps, Requires incidental assist, Hand rails, Extra time, Safety concerns, Verbal cueing  Patient/Family Training/Education:  Ongoing, FT with wife on providing SPV for mobility   DME/DC Recommendations:  FWW, 24 hr care, home PT  Strengths:  Independent PLOF  Barriers:   Agitation, Confused, Poor balance, Poor family support and Other: Very perseverative, Severe L neglect/visual deficits, wife unable to provide physical assist,   # of short term goals set= 4  # of short term goals met=1 (2 partially met)  Physical Therapy Problems     Problem: Mobility     Dates: Start: 02/29/20       Description:     Goal: STG-Within one week, patient will ambulate household distance     Dates: Start: 02/29/20   Expected End: 03/07/20       Description: 1) Individualized goal:  150 ft with FWW and SBA.  2) Interventions:  PT Group Therapy, PT Gait Training, PT Therapeutic Exercises, PT Neuro Re-Ed/Balance, PT Therapeutic Activity and PT Evaluation       Note:     Goal Note filed on 03/11/20 1317 by Ayush PAUL  Jairo, PT    CGA - min A                  Goal: STG-Within one week, patient will ambulate up/down a curb     Dates: Start: 02/29/20   Expected End: 03/07/20       Description: 1) Individualized goal:  With FWW and CGA.  2) Interventions:  PT Group Therapy, PT Gait Training, PT Therapeutic Exercises, PT Neuro Re-Ed/Balance, PT Therapeutic Activity and PT Evaluation     Note:     Goal Note filed on 03/11/20 1317 by Ayush Gill, PT    NT dt focus on other goals                   Goal: STG-Within one week, patient will ascend and descend four to six stairs     Dates: Start: 02/29/20   Expected End: 03/07/20       Description: 1) Individualized goal:  With B rails and CGA.  2) Interventions:  PT Group Therapy, PT Gait Training, PT Therapeutic Exercises, PT Neuro Re-Ed/Balance, PT Therapeutic Activity and PT Evaluation       Note:     Goal Note filed on 03/11/20 1317 by Ayush Gill, PT    Min A for steadying                         Problem: PT-Long Term Goals     Dates: Start: 02/29/20       Description:     Goal: LTG-By discharge, patient will ambulate     Dates: Start: 02/29/20   Expected End: 03/21/20       Description: 1) Individualized goal:  150 ft with LRAD and supervision.  2) Interventions: PT Group Therapy, PT Gait Training, PT Therapeutic Exercises, PT Neuro Re-Ed/Balance, PT Therapeutic Activity and PT Evaluation             Goal: LTG-By discharge, patient will transfer one surface to another     Dates: Start: 02/29/20   Expected End: 03/21/20       Description: 1) Individualized goal:  With LRAD mod I.  2) Interventions:  PT Group Therapy, PT Gait Training, PT Therapeutic Exercises, PT Neuro Re-Ed/Balance, PT Therapeutic Activity and PT Evaluation             Goal: LTG-By discharge, patient will ambulate up/down 4-6 stairs     Dates: Start: 02/29/20   Expected End: 03/21/20       Description: 1) Individualized goal:  With B rails and supervision.  2) Interventions:  PT Group Therapy, PT  Gait Training, PT Therapeutic Exercises, PT Neuro Re-Ed/Balance, PT Therapeutic Activity and PT Evaluation             Goal: LTG-By discharge, patient will transfer in/out of a car     Dates: Start: 02/29/20   Expected End: 03/21/20       Description: 1) Individualized goal:  With LRAD and supervision.  2) Interventions:  PT Group Therapy, PT Gait Training, PT Therapeutic Exercises, PT Neuro Re-Ed/Balance, PT Therapeutic Activity and PT Evaluation           Goal: LTG-By discharge, patient will     Dates: Start: 02/29/20   Expected End: 03/21/20       Description: 1) Individualized goal:  Perform bed mobility with no adaptations independently.  2) Interventions: PT Group Therapy, PT Gait Training, PT Therapeutic Exercises, PT Neuro Re-Ed/Balance, PT Therapeutic Activity and PT Evaluation             Goal: LTG-By discharge, patient will     Dates: Start: 02/29/20   Expected End: 03/21/20       Description: 1) Individualized goal:  Navigate up/down curb with LRAD and supervision.  2) Interventions:  PT Group Therapy, PT Gait Training, PT Therapeutic Exercises, PT Neuro Re-Ed/Balance, PT Therapeutic Activity and PT Evaluation                           Section completed by:  Ayush Gill, PT

## 2020-03-11 NOTE — THERAPY
"Speech Language Pathology  Daily Treatment     Patient Name: Chilo Cabrera  Age:  84 y.o., Sex:  male  Medical Record #: [id number]  Today's Date: 3/11/2020     Subjective    \"I'm going home tomorrow, but I'll work with you today.\"      Objective     03/11/20 0904   Cognition   Simple Attention Severe (2)   Orientation  Moderate (3)   Visual Scanning / Cancellation Skills Severe (2)   Interdisciplinary Plan of Care Collaboration   IDT Collaboration with  Certified Nursing Assistant;Physician   Patient Position at End of Therapy Seated;Self Releasing Lap Belt Applied   Collaboration Comments transfer of care to 1:1, CLOF and POC with Dr. Chase   SLP Total Time Spent   SLP Individual Total Time Spent (Mins) 30   Charge Group   SLP Cognitive Skill Development First 15 Minutes 1   SLP Cognitive Skill Development Additional 15 Minutes 1         Assessment    Pt agreeable to ST this date, aside from stating he is going home tomorrow, pt exhibited no other instances of confabulation, tangential speech this session. Orientation Log given resulting in 19/30 (improved from 14/30 day prior). Pt oriented independently to kind of place, month, etiology, with logical cueing pt was able to identify city, date, CHARU. Simple selective attention task, MAX cues (gestural, tactile, verbal and visual) to locate 1 target correctly. Pt demonstrated difficulty scanning environment despite large print, single line, extra white space from L-R, frequently moving finger to random areas on table away from paper.     Plan    Continue to target simple attention, orientation    "

## 2020-03-11 NOTE — DISCHARGE PLANNING
This TCS called Dr. Pineda's office @ [phone] and had to leave a message to verify signer status.  Awaiting call back.  Thank you!

## 2020-03-11 NOTE — CARE PLAN
Problem: Safety  Goal: Will remain free from injury  Note: Pt with 1:1 sitter for safety.     Problem: Urinary Elimination:  Goal: Ability to reestablish a normal urinary elimination pattern will improve  Note: Patient is continent of bladder this shift.  Will continue to monitor.

## 2020-03-11 NOTE — PROGRESS NOTES
"Rehab Progress Note     Date of Service: 3/11/2020  Chief Complaint: follow up stroke    Interval Events (Subjective)    Patient seen and examined today in the therapy gym.  He is just worked with recreational therapy.  He slept quite well last night on the Abilify and has not been agitated or impulsive thus far today.  Patient denies any pain.  He wants to know when he is going home.  He has no other complaints.    Objective:  VITAL SIGNS: /71   Pulse 96   Temp 36.9 °C (98.4 °F) (Oral)   Resp 18   Ht 1.951 m (6' 4.81\")   Wt 76.2 kg (168 lb 1.6 oz)   SpO2 94%   BMI 20.03 kg/m²   Gen: alert, no apparent distress  CV: regular rate and rhythm, no murmurs, no peripheral edema  Resp: clear to ascultation bilaterally, normal respiratory effort  GI: soft, non-tender abdomen, bowel sounds present  Neuro: notable for left visual field cut, disorientation    Recent Results (from the past 72 hour(s))   CBC WITH DIFFERENTIAL    Collection Time: 03/09/20  6:27 AM   Result Value Ref Range    WBC 5.5 4.8 - 10.8 K/uL    RBC 4.40 (L) 4.70 - 6.10 M/uL    Hemoglobin 14.1 14.0 - 18.0 g/dL    Hematocrit 41.2 (L) 42.0 - 52.0 %    MCV 93.6 81.4 - 97.8 fL    MCH 32.0 27.0 - 33.0 pg    MCHC 34.2 33.7 - 35.3 g/dL    RDW 50.1 (H) 35.9 - 50.0 fL    Platelet Count 129 (L) 164 - 446 K/uL    MPV 11.6 9.0 - 12.9 fL    Neutrophils-Polys 72.60 (H) 44.00 - 72.00 %    Lymphocytes 18.60 (L) 22.00 - 41.00 %    Monocytes 6.10 0.00 - 13.40 %    Eosinophils 1.80 0.00 - 6.90 %    Basophils 0.50 0.00 - 1.80 %    Immature Granulocytes 0.40 0.00 - 0.90 %    Nucleated RBC 0.00 /100 WBC    Neutrophils (Absolute) 4.02 1.82 - 7.42 K/uL    Lymphs (Absolute) 1.03 1.00 - 4.80 K/uL    Monos (Absolute) 0.34 0.00 - 0.85 K/uL    Eos (Absolute) 0.10 0.00 - 0.51 K/uL    Baso (Absolute) 0.03 0.00 - 0.12 K/uL    Immature Granulocytes (abs) 0.02 0.00 - 0.11 K/uL    NRBC (Absolute) 0.00 K/uL   URINALYSIS    Collection Time: 03/09/20  2:00 PM   Result Value Ref " Range    Color DK Yellow     Character Turbid (A)     Specific Gravity 1.022 <1.035    Ph 5.0 5.0 - 8.0    Glucose Negative Negative mg/dL    Ketones Trace (A) Negative mg/dL    Protein Negative Negative mg/dL    Bilirubin Small (A) Negative    Urobilinogen, Urine 1.0 Negative    Nitrite Negative Negative    Leukocyte Esterase Small (A) Negative    Occult Blood Negative Negative    Micro Urine Req Microscopic    URINE MICROSCOPIC (W/UA)    Collection Time: 03/09/20  2:00 PM   Result Value Ref Range    WBC 0-2 (A) /hpf    RBC 0-2 (A) /hpf    Bacteria Negative None /hpf    Epithelial Cells Few /hpf    Mucous Threads Rare /hpf    Amorphous Crystal Present /hpf   URINE CULTURE-EXISTING-LESS THAN 48 HOURS    Collection Time: 03/09/20  2:00 PM   Result Value Ref Range    Significant Indicator POS (POS)     Source UR     Site URINE, CLEAN CATCH     Culture Result - (A)     Culture Result Group D Enterococcus species  ,000 cfu/mL   (A)    Basic Metabolic Panel    Collection Time: 03/10/20  6:21 AM   Result Value Ref Range    Sodium 141 135 - 145 mmol/L    Potassium 3.8 3.6 - 5.5 mmol/L    Chloride 105 96 - 112 mmol/L    Co2 28 20 - 33 mmol/L    Glucose 117 (H) 65 - 99 mg/dL    Bun 14 8 - 22 mg/dL    Creatinine 0.89 0.50 - 1.40 mg/dL    Calcium 9.3 8.5 - 10.5 mg/dL    Anion Gap 8.0 0.0 - 11.9   CBC WITH DIFFERENTIAL    Collection Time: 03/10/20  6:21 AM   Result Value Ref Range    WBC 4.5 (L) 4.8 - 10.8 K/uL    RBC 3.99 (L) 4.70 - 6.10 M/uL    Hemoglobin 12.7 (L) 14.0 - 18.0 g/dL    Hematocrit 37.1 (L) 42.0 - 52.0 %    MCV 93.0 81.4 - 97.8 fL    MCH 31.8 27.0 - 33.0 pg    MCHC 34.2 33.7 - 35.3 g/dL    RDW 49.1 35.9 - 50.0 fL    Platelet Count 111 (L) 164 - 446 K/uL    MPV 11.8 9.0 - 12.9 fL    Neutrophils-Polys 71.90 44.00 - 72.00 %    Lymphocytes 17.90 (L) 22.00 - 41.00 %    Monocytes 7.10 0.00 - 13.40 %    Eosinophils 2.00 0.00 - 6.90 %    Basophils 0.90 0.00 - 1.80 %    Immature Granulocytes 0.20 0.00 - 0.90 %     Nucleated RBC 0.00 /100 WBC    Neutrophils (Absolute) 3.25 1.82 - 7.42 K/uL    Lymphs (Absolute) 0.81 (L) 1.00 - 4.80 K/uL    Monos (Absolute) 0.32 0.00 - 0.85 K/uL    Eos (Absolute) 0.09 0.00 - 0.51 K/uL    Baso (Absolute) 0.04 0.00 - 0.12 K/uL    Immature Granulocytes (abs) 0.01 0.00 - 0.11 K/uL    NRBC (Absolute) 0.00 K/uL   ESTIMATED GFR    Collection Time: 03/10/20  6:21 AM   Result Value Ref Range    GFR If African American >60 >60 mL/min/1.73 m 2    GFR If Non African American >60 >60 mL/min/1.73 m 2       Current Facility-Administered Medications   Medication Frequency   • aripiprazole (ABILIFY) tablet 2.5 mg QHS   • melatonin tablet 3 mg QHS   • traZODone (DESYREL) tablet 50 mg QHS   • chlorhexidine (PERIDEX) 0.12 % solution 15 mL BID   • senna-docusate (PERICOLACE or SENOKOT S) 8.6-50 MG per tablet 2 Tab BID PRN    And   • polyethylene glycol/lytes (MIRALAX) PACKET 1 Packet QDAY PRN    And   • magnesium hydroxide (MILK OF MAGNESIA) suspension 30 mL QDAY PRN    And   • bisacodyl (DULCOLAX) suppository 10 mg QDAY PRN   • QUEtiapine (SEROQUEL) tablet 25 mg Q8HRS PRN   • vitamin D (cholecalciferol) tablet 2,000 Units DAILY   • Respiratory Therapy Consult Continuous RT   • Pharmacy Consult Request ...Pain Management Review 1 Each PHARMACY TO DOSE   • acetaminophen (TYLENOL) tablet 650 mg Q4HRS PRN   • hydrALAZINE (APRESOLINE) tablet 25 mg Q8HRS PRN   • artificial tears ophthalmic solution 1 Drop PRN   • benzocaine-menthol (CEPACOL) lozenge 1 Lozenge Q2HRS PRN   • mag hydrox-al hydrox-simeth (MAALOX PLUS ES or MYLANTA DS) suspension 20 mL Q2HRS PRN   • ondansetron (ZOFRAN ODT) dispertab 4 mg 4X/DAY PRN    Or   • ondansetron (ZOFRAN) syringe/vial injection 4 mg 4X/DAY PRN   • sodium chloride (OCEAN) 0.65 % nasal spray 2 Spray PRN   • hydrOXYzine HCl (ATARAX) tablet 50 mg Q6HRS PRN   • lactulose 20 GM/30ML solution 30 mL QDAY PRN   • docusate sodium (ENEMEEZ) enema 283 mg QDAY PRN   • Eltrombopag Olamine TABS 50  mg DAILY   • allopurinol (ZYLOPRIM) tablet 300 mg DAILY   • aspirin EC (ECOTRIN) tablet 325 mg DAILY       Orders Placed This Encounter   Procedures   • Diet Order Regular     Standing Status:   Standing     Number of Occurrences:   1     Order Specific Question:   Diet:     Answer:   Regular [1]       Assessment:  Active Hospital Problems    Diagnosis   • *Cerebrovascular accident (CVA) due to thrombosis of right posterior cerebral artery (HCC)   • Vitamin D deficiency   • Visual field cut   • Cognitive deficits   • Essential hypertension   • History of ITP   • History of gout     This patient is a 84 y.o. male admitted for acute inpatient rehabilitation with Cerebrovascular accident (CVA) due to thrombosis of right posterior cerebral artery (HCC).    I led and attended the weekly conference, and agree with the IDT conference documentation and plan of care as noted below.    Date of conference: 3/11/2020    Goals and barriers: See IDT note.    Biggest barriers: confusion, impulsive, left visual field cut    Goals in next week: discharge    Therapy update 3/11/2020  Supervision eating  Supervision grooming  Min assist bathing  Supervision upper body dressing  Supervision lower body dressing  Supervision toileting  Supervisionbladder  Supervision bowel  Supervision bed/chair transfer  Supervision toilet transfer  Supervision tub/shower transfer  Min assist ambulation  Min assist wheelchair propulsion  Min assist stairs  Supervision comprehension  Supervision expression  Supervision social interaction  Min assist problem solving  Max assist memory    CM/social support: wife supportive, but likely can't physically assist    Anticipated DC date: 3/14/2020, if wife can assist at home, due to her own mobility limitations    Home health: PT/OT/SLP/RN    Equip: FWW    Follow up: PCP, rehab clinic       Medical Decision Making and Plan:    Right PCA stroke  Left homonymous hemianopsia  Left pronator  drift  Dominant  Cognitive deficits, severe  Continue full rehab program  PT/OT/SLP, 1 hr each discipline, 5 days per week  Continue aspirin for secondary stroke prophylaxis  Statin not indicated, LDL 32    Encephalopathy  Agitation  Negative UA x 2, ordered culture   Melatonin, trazodone, seroquel not effective  Scheduled low dose Abilify, in addition to melatonin and trazodone, slept well last night  Discontinued 1:1  +UTI     Mild tachycardia  Intermittent hypertension  Consider some propranolol, which also may help with his agitation    Halitosis  Continue Peridex, oral care    History of gout  Continue allopurinol    History of Immune thrombocytopenia  Continue home medication, Eltrombopag  Monitor labs, stable on recheck x 2    Normocytic anemia  Mild, stable    Vit D deficiency, 15  Continue supplementation    Hypokalemia, resolved  Given one dose of potassium    Bowel  Constipation, resolved  As needed bowel meds  Last BM 3/10    Bladder  Check PVRs -59, 149, 50, 96  Not retaining  ICP for over 400 cc  Scheduled toileting   UA negative x 2  Sent culture, Grp D enterococcus, start on Bactrim    DVT prophylaxis  Discontinued Lovenox as he is ambulating long distances    Total time:  40 minutes.  I spent greater than 50% of the time for patient care, counseling, and coordination on this date, including patient face-to face time, unit/floor time with review of records/pertinent lab data and studies, as well as discussing diagnostic evaluation/work up, planned therapeutic interventions, and future disposition of care, as per the interval events/subjective and the assessment and plan as noted above.    Elba Chase M.D.   Physical Medicine and Rehabilitation

## 2020-03-11 NOTE — DISCHARGE PLANNING
We are currently verifying MD acceptance of your patient. This will be resolved ASAP. If you want this escalated for a faster response please call [phone] and press option #2. Thank you for your patience.     Respectfully,   [facility]

## 2020-03-11 NOTE — PROGRESS NOTES
Patient care assumed. Report received from Serg Hatch. Patient is alert and calm, resting in bed. Call light and bedside table within reach. Will continue to monitor.

## 2020-03-11 NOTE — CARE PLAN
Problem: Bathing  Goal: STG-Within one week, patient will bathe  Description: 1) Individualized Goal: With supervision and verbal cues,AE/ DME prn.  2) Interventions: OT Orthotics Training, OT E Stim Attended, OT Group Therapy, OT Self Care/ADL, OT Cognitive Skill Dev, OT Community Reintegration, OT Manual Ther Technique, OT Neuro Re-Ed/Balance, OT Sensory Int Techniques, OT Therapeutic Activity, OT Ultrasound, OT Evaluation and OT Therapeutic Exercise      Outcome: NOT MET  Note: Con't to require intermittent CGA for safety d/t impaired vision and balance.     Problem: Dressing  Goal: STG-Within one week, patient will dress LB  Description: 1) Individualized Goal:  With supervision and verbal cues  2) Interventions:  OT Orthotics Training, OT E Stim Attended, OT Group Therapy, OT Self Care/ADL, OT Cognitive Skill Dev, OT Community Reintegration, OT Manual Ther Technique, OT Neuro Re-Ed/Balance, OT Sensory Int Techniques, OT Therapeutic Activity, OT Ultrasound, OT Evaluation and OT Therapeutic Exercise   Outcome: MET     Problem: Functional Transfers  Goal: STG-Within one week, patient will transfer to toilet  Description: 1) Individualized Goal:  With supervision and verbal cues  2) Interventions:  OT Orthotics Training, OT E Stim Attended, OT Group Therapy, OT Self Care/ADL, OT Cognitive Skill Dev, OT Community Reintegration, OT Manual Ther Technique, OT Neuro Re-Ed/Balance, OT Sensory Int Techniques, OT Therapeutic Activity, OT Ultrasound, OT Evaluation and OT Therapeutic Exercise   Outcome: MET

## 2020-03-11 NOTE — DISCHARGE PLANNING
DME referral sent to [facility].  HH referral sent to [facility] per choice form.  Awaiting responses.

## 2020-03-11 NOTE — REHAB-COLLABORATIVE ONGOING IDT TEAM NOTE
Weekly Interdisciplinary Team Conference Note    Weekly Interdisciplinary Team Conference # 2  Date:  3/11/2020    Clinicians present and reporting at team conference include the following:   MD: Elba Chase MD   RN:  Galina Smith RN   PT:   Brent Gill PT, DPT  OT:  Tiana Kirby MS, OTR/L   ST:  Itzel Gonzalez MS, CCC-SLP  CM:  Betzaida Chávez MS, LSW  REC:  Yoandy Ceballos CTRS  RT:  None  RPh:  Karlo Brunner h  Other:   None  All reporting clinicians have a working knowledge of this patient's plan of care.    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) 02/28/2020   • Essential hypertension 02/28/2020   • History of ITP 02/28/2020   • History of gout 02/28/2020     Results     Procedure Component Value Ref Range Date/Time    URINE CULTURE-EXISTING-LESS THAN 48 HOURS [[id number]]  (Abnormal) Collected:  03/09/20 1400    Order Status:  Completed Lab Status:  Preliminary result Updated:  03/11/20 1212    Specimen:  Urine, Clean Catch      Significant Indicator POS     Source UR     Site URINE, CLEAN CATCH     Culture Result -      Group D Enterococcus species  ,000 cfu/mL      Narrative:       Collected By:Yael KOENIG  Indication for culture:->Acute unexplained altered mental  status ONLY after ruling out other recognized cause  Collected By:[id number] JESSICA KOENIG        Nursing  Diet/Nutrition:  Regular and Thin Liquids  Medication Administration:  Whole with Liquid Wash  % consumed at meals in last 24 hours:  Consumed 25-75% of meals per documentation.  Meal/Snack Consumption for the past 24 hrs:   Oral Nutrition   03/03/20 1857 Dinner;Less than 25% Consumed   03/03/20 0900 Breakfast;Between 50-75% Consumed     Appetite:  Fair  Admit  Weight:  Weight: 75 kg (165 lb 5.5 oz)  Weight Last 7 Days   [75 kg (165 lb 5.5 oz)-77.5 kg (170 lb 13.7 oz)] 77.5 kg (170 lb 13.7 oz) (03/01 0845)    Pain Issues:    Location:  --  --         Severity:  Denies   Scheduled pain medications:  None     PRN pain medications used in last 24 hours:  None   Non Pharmacologic Interventions:  distraction, emotional support, relaxation, repositioned and rest  Effectiveness of pain management plan:  good=patient states acceptable comfort after interventions    Bowel:    Bowel Assist Initial Score:  1 - Total Assistance  Bowel Assist Current Score:  5 - Standby Prompting/Supervision or Set-up  Bowl Accidents in last 7 days:  0  Last bowel movement: 03/03/20  Stool Description: Small, Loose     Usual bowel pattern:  daily  Scheduled bowel medications:  senna-docusate (PERICOLACE or SENOKOT S)   PRN bowel medications used in last 24 hours:  None  Nursing Interventions:  Increased time, Scheduled medication, Set-up, Verbal cueing  Effectiveness of bowel program:  Fair - having loose BMs    Bladder:    Bladder Assist Initial Score:  1 - Total Assistance  Bladder Assist Current Score:  5 - Standby Prompting/Supervision or Set-up  Bladder Accidents in last 7 days:  5  PVR range for past 24-48 hours:  []   Intermittent Catheterization:  no  Medications affecting bladder:  None    Time void schedule/voiding pattern:  Voiding every 2-4 hours  Interventions:  Increased time, Supervision, Verbal cueing, Time void patient initiated  Effectiveness of bladder training:  Fair=occasional unpredictable, incontinent emptying of bladder (< 1 time/day)    Sleep/wake cycle:   Average hours slept:  Sleeps 3-4 hours without waking  Sleep medication usage:  None    Patient/Family Training/Education:  Bladder Management/Training, Bowel Management/Training, Fall Prevention, General Self Care, Safe Transfers and Safety  Discharge Supply Recommendations:  Blood Pressure Monitor  Strengths: Manages  pain appropriately   Barriers:   Bladder incontinence, Fatigue and Limited mobility       Nursing Problems     Problem: Bowel/Gastric:     Description:     Goal: Normal bowel function is maintained or improved     Description:           Goal: Will not experience complications related to bowel motility     Description:                 Problem: Communication     Description:     Goal: The ability to communicate needs accurately and effectively will improve     Description:                 Problem: Discharge Barriers/Planning     Description:     Goal: Patient's continuum of care needs will be met     Description:                 Problem: Infection     Description:     Goal: Will remain free from infection     Description:                 Problem: Knowledge Deficit     Description:     Goal: Knowledge of disease process/condition, treatment plan, diagnostic tests, and medications will improve     Description:           Goal: Knowledge of the prescribed therapeutic regimen will improve     Description:                 Problem: Pain Management     Description:     Goal: Pain level will decrease to patient's comfort goal     Description:                 Problem: Psychosocial Needs:     Description:     Goal: Level of anxiety will decrease     Description:                 Problem: Respiratory:     Description:     Goal: Respiratory status will improve     Description:                 Problem: Safety     Description:     Goal: Will remain free from injury     Description:           Goal: Will remain free from falls     Description:                 Problem: Skin Integrity     Description:     Goal: Risk for impaired skin integrity will decrease     Description:                 Problem: Urinary Elimination:     Description:     Goal: Ability to reestablish a normal urinary elimination pattern will improve     Description:                 Problem: Venous Thromboembolism (VTW)/Deep Vein Thrombosis (DVT) Prevention:      Description:     Goal: Patient will participate in Venous Thrombosis (VTE)/Deep Vein Thrombosis (DVT)Prevention Measures     Description:                        Long Term Goals:   At discharge patient will be able to function safely at home and in the community with support.    Section completed by:  Rey Mcintosh R.N.           Mobility  Bed mobility:   SPV  Bed /Chair/Wheelchair Transfer Initial:  4 - Minimal Assistance  Bed /Chair/Wheelchair Transfer Current:  5 - Standby Prompting/Supervision or Set-up   Bed/Chair/Wheelchair Transfer Description:  Supervision for safety, Verbal cueing, Increased time, Adaptive equipment  Walk Initial:  4 - Minimal Assistance  Walk Current:  4 - Minimal Assistance   Walk Description:  Walker, Extra time, Safety concerns, Verbal cueing, Requires incidental assist(250 ft, constant cues for avoiding objects and directionas well as posture and FWW management )  Wheelchair Initial:  2 - Max Assistance  Wheelchair Current:  4 - Minimal Assistance   Wheelchair Description:  Extra time, Adaptive equipment, Requires incidental assist, Safety concerns, Verbal cueing  Stairs Initial:  0 - Not tested,unsafe activity  Stairs Current: 4 - Minimal Assistance   Stairs Description: Ascends/descends 12 to 14 steps, Requires incidental assist, Hand rails, Extra time, Safety concerns, Verbal cueing  Patient/Family Training/Education:  Ongoing, FT with wife on providing SPV for mobility   DME/DC Recommendations:  FWW, 24 hr care, home PT  Strengths:  Independent PLOF  Barriers:   Agitation, Confused, Poor balance, Poor family support and Other: Very perseverative, Severe L neglect/visual deficits, wife unable to provide physical assist,   # of short term goals set= 4  # of short term goals met=1 (2 partially met)  Physical Therapy Problems     Problem: Mobility     Dates: Start: 02/29/20       Description:     Goal: STG-Within one week, patient will ambulate household distance     Dates: Start:  02/29/20   Expected End: 03/07/20       Description: 1) Individualized goal:  150 ft with FWW and SBA.  2) Interventions:  PT Group Therapy, PT Gait Training, PT Therapeutic Exercises, PT Neuro Re-Ed/Balance, PT Therapeutic Activity and PT Evaluation       Note:     Goal Note filed on 03/11/20 1317 by Ayush Gill, PT    CGA - min A                  Goal: STG-Within one week, patient will ambulate up/down a curb     Dates: Start: 02/29/20   Expected End: 03/07/20       Description: 1) Individualized goal:  With FWW and CGA.  2) Interventions:  PT Group Therapy, PT Gait Training, PT Therapeutic Exercises, PT Neuro Re-Ed/Balance, PT Therapeutic Activity and PT Evaluation     Note:     Goal Note filed on 03/11/20 1317 by Ayush Gill, PT    NT dt focus on other goals                   Goal: STG-Within one week, patient will ascend and descend four to six stairs     Dates: Start: 02/29/20   Expected End: 03/07/20       Description: 1) Individualized goal:  With B rails and CGA.  2) Interventions:  PT Group Therapy, PT Gait Training, PT Therapeutic Exercises, PT Neuro Re-Ed/Balance, PT Therapeutic Activity and PT Evaluation       Note:     Goal Note filed on 03/11/20 1317 by Ayush Gill, PT    Min A for steadying                         Problem: PT-Long Term Goals     Dates: Start: 02/29/20       Description:     Goal: LTG-By discharge, patient will ambulate     Dates: Start: 02/29/20   Expected End: 03/21/20       Description: 1) Individualized goal:  150 ft with LRAD and supervision.  2) Interventions: PT Group Therapy, PT Gait Training, PT Therapeutic Exercises, PT Neuro Re-Ed/Balance, PT Therapeutic Activity and PT Evaluation             Goal: LTG-By discharge, patient will transfer one surface to another     Dates: Start: 02/29/20   Expected End: 03/21/20       Description: 1) Individualized goal:  With LRAD mod I.  2) Interventions:  PT Group Therapy, PT Gait Training, PT Therapeutic  Exercises, PT Neuro Re-Ed/Balance, PT Therapeutic Activity and PT Evaluation             Goal: LTG-By discharge, patient will ambulate up/down 4-6 stairs     Dates: Start: 02/29/20   Expected End: 03/21/20       Description: 1) Individualized goal:  With B rails and supervision.  2) Interventions:  PT Group Therapy, PT Gait Training, PT Therapeutic Exercises, PT Neuro Re-Ed/Balance, PT Therapeutic Activity and PT Evaluation             Goal: LTG-By discharge, patient will transfer in/out of a car     Dates: Start: 02/29/20   Expected End: 03/21/20       Description: 1) Individualized goal:  With LRAD and supervision.  2) Interventions:  PT Group Therapy, PT Gait Training, PT Therapeutic Exercises, PT Neuro Re-Ed/Balance, PT Therapeutic Activity and PT Evaluation           Goal: LTG-By discharge, patient will     Dates: Start: 02/29/20   Expected End: 03/21/20       Description: 1) Individualized goal:  Perform bed mobility with no adaptations independently.  2) Interventions: PT Group Therapy, PT Gait Training, PT Therapeutic Exercises, PT Neuro Re-Ed/Balance, PT Therapeutic Activity and PT Evaluation             Goal: LTG-By discharge, patient will     Dates: Start: 02/29/20   Expected End: 03/21/20       Description: 1) Individualized goal:  Navigate up/down curb with LRAD and supervision.  2) Interventions:  PT Group Therapy, PT Gait Training, PT Therapeutic Exercises, PT Neuro Re-Ed/Balance, PT Therapeutic Activity and PT Evaluation                           Section completed by:  Ayush Gill, PT    Activities of Daily Living  Eating Initial:  2 - Max Assistance  Eating Current:  5 - Standby Prompting/Supervision or Set-up   Eating Description:  Increased time, Supervision for safety  Grooming Initial:  5 - Standby Prompting/Supervision or Set-up  Grooming Current:  5 - Standby Prompting/Supervision or Set-up   Grooming Description:  Increased time, Supervision for safety, Verbal cueing, Set-up of  equipment(SBA seated at sink for shaving and combing hair.)  Bathing Initial:  4 - Minimal Assistance  Bathing Current:  4 - Minimal Assistance   Bathing Description:  Grab bar, Tub bench, Hand held shower, Increased time, Supervision for safety, Verbal cueing, Set-up of equipment(per CNA report from 3/10: requires CGA-SBA and cues for safety.)  Upper Body Dressing Initial:  5 - Standby Prompting/Supervision or Set-up  Upper Body Dressing Current:  5 - Standby Prompting/Supervision or Set-up   Upper Body Dressing Description:  Increased time, Supervision for safety, Verbal cueing, Set-up of equipment(SBA for orientation of shirt.)  Lower Body Dressing Initial:  3 - Moderate Assistance  Lower Body Dressing Current:  5 - Standby Prompting/Supervsion or Set-up   Lower Body Dressing Description:  5 - Standby Prompting/Supervsion or Set-up  Toileting Initial:  4 - Minimal Assistance  Toileting Current:  5 - Standby Prompting/Supervision or Set-up   Toileting Description:  Grab bar, Increased time, Supervision for safety, Verbal cueing, Set-up of equipment  Toilet Transfer Initial:  4 - Minimal Assistance  Toilet Transfer Current:  5 - Standby Prompting/Supervision or Set-up   Toilet Transfer Description:  5 - Standby Prompting/Supervision or Set-up  Tub / Shower Transfer Initial:  4 - Minimal Assistance  Tub / Shower Transfer Current:  5 - Standby Prompting/Supervision or Set-up   Tub / Shower Transfer Description:  Grab bar, Increased time, Supervision for safety, Verbal cueing, Set-up of equipment(per CNA report from 3/10: requires use of GB and close SBA for w/c<>shower bench SPT.)  IADL:  TBD.   Family Training/Education:  Ongoing with pt and spouse: safety with ADLs and bathroom transfers, bathroom DME, home safety and fall prevention, CVA recovery, compensatory strategies for visual field cut.    DME/DC Recommendations:  Home health OT, 24/7 supervision; pt will require tub transfer bench and GB near toilet and  shower for safety.      Strengths:  Independent PLOF, Pleasant and cooperative, Supportive family and Willingly participates in therapeutic activities  Barriers:  Confused, Decreased endurance, Generalized weakness, Impulsive, Poor activity tolerance, Poor balance, Poor insight/denial of deficits, Poor sleep pattern and Other: left visual field cut     # of short term goals set= 3    # of short term goals met= 2     Occupational Therapy Goals     Problem: Bathing     Dates: Start: 03/04/20       Description:     Goal: STG-Within one week, patient will bathe     Dates: Start: 03/04/20       Description: 1) Individualized Goal: With supervision and verbal cues,AE/ DME prn.  2) Interventions: OT Orthotics Training, OT E Stim Attended, OT Group Therapy, OT Self Care/ADL, OT Cognitive Skill Dev, OT Community Reintegration, OT Manual Ther Technique, OT Neuro Re-Ed/Balance, OT Sensory Int Techniques, OT Therapeutic Activity, OT Ultrasound, OT Evaluation and OT Therapeutic Exercise        Note:     Goal Note filed on 03/11/20 0805 by Tiana Kirby, OT    Con't to require intermittent CGA for safety d/t impaired vision and balance.                        Problem: OT Long Term Goals     Dates: Start: 02/29/20       Description:     Goal: LTG-By discharge, patient will complete basic self care tasks     Dates: Start: 02/29/20   Expected End: 03/21/20       Description: 1) Individualized Goal:  With mod I  2) Interventions:  OT Orthotics Training, OT E Stim Attended, OT Group Therapy, OT Self Care/ADL, OT Cognitive Skill Dev, OT Community Reintegration, OT Manual Ther Technique, OT Neuro Re-Ed/Balance, OT Sensory Int Techniques, OT Therapeutic Activity, OT Ultrasound, OT Evaluation and OT Therapeutic Exercise           Goal: LTG-By discharge, patient will complete basic home management     Dates: Start: 02/29/20   Expected End: 03/21/20       Description: 1) Individualized Goal:  With min A  2) Interventions:  OT Orthotics  Training, OT E Stim Attended, OT Group Therapy, OT Self Care/ADL, OT Cognitive Skill Dev, OT Community Reintegration, OT Manual Ther Technique, OT Neuro Re-Ed/Balance, OT Sensory Int Techniques, OT Therapeutic Activity, OT Ultrasound, OT Evaluation and OT Therapeutic Exercise                       Section completed by:  Tiana Kirby, OT    Cognitive Linquistic Functions  Comprehension Initial:  5 - Stand-by Prompting/Supervision or Set-up  Comprehension Current:  5 - Stand-by Prompting/Supervision or Set-up   Comprehension Description:  Glasses, Verbal cues  Expression Initial:  5 - Stand-by Prompting/Supervision or Set-up  Expression Current:  5 - Stand-by Prompting/Supervision or Set-up   Expression Description:  Verbal cueing  Social Interaction Initial:  5 - Stand-by Prompting/Supervision or Set-up  Social Interaction Current:  5 - Stand-by Prompting/Supervision or Set-up   Social Interaction Description:  Increased time, Medication, Verbal cues  Problem Solving Initial:  4 - Minimal Assistance  Problem Solving Current:  2 - Max Assistance   Problem Solving Description:  Verbal cueing, Increased time, Therapy schedule, Seat belt, Bed/chair alarm  Memory Initial:  2 - Max Assistance  Memory Current:  2 - Max Assistance   Memory Description:  Verbal cueing, Seat belt, Therapy schedule, Bed/chair alarm  Executive Functioning / Organization Initial:     Executive Functioning / Organization Current: 2 - Max Assistance      Executive Functioning Desciption: per spouse pt independent with meds prior. Will require support at home for IADLs at this time.   Swallowing  Swallowing Status: Not following for dysphagia   Orders Placed This Encounter   Procedures   • Diet Order Regular     Standing Status:   Standing     Number of Occurrences:   1     Order Specific Question:   Diet:     Answer:   Regular [1]     Behavior Modification Plan  Keep the environment simple to avoid over stimulatiom/agitation, Allow for rest  time, Keep instructions simple/concrete, Redirect to task/topic, Provide reasonable choices, Decrease the chance of failure by offering activities that are within the patient's abilities, Analyze tasks (break down into smaller steps) and Reinforce participation in desired tasks  Assistive Technology  Low/Impaired vision equipment and Low tech: Calendar, planner, schedule, alarms/timers, pill organizer, post-it notes, lists  Family Training/Education:  Initiated with spouse, will remain on going  DC Recommendations: Limited progress, primary SLP recommending transition to SNF as pt will require close 24-7 supervision and ongoing therapy services  Strengths:  Independent PLOF and Supportive family  Barriers:  Confused, Decreased endurance, Generalized weakness, Impulsive, Poor activity tolerance, Poor carryover of learning, Poor insight/denial of deficits and Other: STM, left inattention  # of short term goals set=2  # of short term goals met=0  Speech Therapy Problems     Problem: Memory STGs     Dates: Start: 02/29/20       Description:     Goal: STG-Within one week, patient will     Dates: Start: 02/29/20       Description: 1) Individualized goal:  Utilize external memory aids for orientation to date and daily therapy activities with 80% acc and MOD A.   2) Interventions:  SLP Self Care / ADL Training , SLP Cognitive Skill Development and SLP Group Treatment       Note:     Goal Note filed on 03/04/20 0754 by Tia Vallecillo MS,CCC-SLP    O- log improved to 16/20 yesterday, compared to 12/20 in session previous to that.                        Problem: Problem Solving STGs     Dates: Start: 02/29/20       Description:     Goal: STG-Within one week, patient will     Dates: Start: 02/29/20       Description: 1) Individualized goal:  Complete basic single target scanning activities with 80% accuracy and MIN A.   2) Interventions:  SLP Self Care / ADL Training , SLP Cognitive Skill Development and SLP Group Treatment        Note:     Goal Note filed on 03/04/20 0754 by Tia Vallecillo MS,CCC-SLP    To be addressed.                        Problem: Speech/Swallowing LTGs     Dates: Start: 02/29/20       Description:     Goal: LTG-By discharge, patient will     Dates: Start: 02/29/20       Description: 1) Individualized goal:  Solve basic to moderate problems related to health and safety using external memory aids when needed with 80% accuracy and MIN A.   2) Interventions:  SLP Self Care / ADL Training  and SLP Cognitive Skill Development, Group Treatment                          Section completed by:  Itzel Gonzalez MS,CCC-SLP    Recreation/Community     Leisure Competence Measure  Leisure Awareness: Moderate Assist  Leisure Attitude: Moderate Assist  Leisure Skills: Moderate Assist  Cultural / Social Behaviors: Modified Independent  Community Integration Skills: Moderate Assist  Community Participation: Moderate Assist    Strengths:  Supportive family  Barriers:  Agitation, Confused, Decreased endurance, Generalized weakness, Poor activity tolerance, Poor carryover of learning and Poor insight/denial of deficits  # of short term goals set=2  # of short term goals met=1    Pt has had poor insight and confusion. I last session Pt was aggreable to treatment however did speak on wanting to watch a horse giving birth in the field. Pt was asked where the field was and he stated it was behind the hospital. Mod verbal cues for left side neglect. Unable to share on any activities he would like to return to following discharge. Hyper focused on discharge.     Recreation Therapy Problems     Problem: Recreation Therapy     Dates: Start: 03/04/20       Description:     Goal: STG-Within one week, patient will demonstrate leisure problem solving     Dates: Start: 03/04/20       Description: By sharing on two leisure activities he would like to participate in either during sessions or during his free time and any perceived barriers to his  participation.            Goal: LTG-By discharge, patient will demonstrate leisure problem solving     Dates: Start: 03/04/20       Description: By sharing on two leisure activities he would like to participate in following discharge and any perceived barriers to his participation.            Goal: LTG-By discharge, patient will     Dates: Start: 03/04/20       Description: Complete a cognitive leisure activity with 80% accuracy with Min verbal cues.                        Section completed by:  JUDY LaoS    Nutrition  Dietary Problems     Problem: Inadequate nutrient intake     Description:     Goal: Patient to consume greater than or equal to 50% of meals     Description:                       Patient continues to eat poorly likely s/t altered mentation with being unaware of his surrounds and reality.  He is not sleeping well as well and MDs are adjusting medications.  Patient now with sitter.  Patient resting during time of RD eval.  Due to not sleeping well last night, I did not arouse him    PO< 50% of meals over past 72 hours on regular diet  Boost Plus added 3/9  Appetite: Poor    Pertinent Labs:WBC 4.5  Pertinent medications: noted     Weight: 76.2kg - stable but not different scales  Skin: CDI    Vitals: WNL  GI: BM today  : WNL  I/Os: +390mL x 24 hours    Plan: Liberalized diet.  Supplements.  Expect PO to improve with mentation.  If PO does not improve in next day or two many consider appetite stimulant.        Section completed by:  Maria G Davies R.D.    REHAB-Pharmacy IDT Team Note by Ofelia Bowman RPH at 3/10/2020 11:53 AM  Version 1 of 1    Author:  Ofelia Bowman RPH Service:  -- Author Type:  Pharmacist    Filed:  3/10/2020 11:55 AM Date of Service:  3/10/2020 11:53 AM Status:  Signed    :  Ofelia Bowman RPH (Pharmacist)         Pharmacy   Pharmacy  Antibiotics/Antifungals/Antivirals:  Medication:      Active Orders (From admission, onward)    None        Route:          n/a  Stop Date:  n/a  Reason:   Antihypertensives/Cardiac:  Medication:    Orders (72h ago, onward)     Start     Ordered    02/28/20 1518  hydrALAZINE (APRESOLINE) tablet 25 mg  EVERY 8 HOURS PRN      02/28/20 1520              Patient Vitals for the past 24 hrs:   BP Pulse   03/10/20 0700 129/67 99   03/09/20 1958 -- (!) 120   03/09/20 1900 147/78 (!) 123   03/09/20 1400 143/86 91     Anticoagulation:  Medication:  Aspirin  INR:      Other key medications: allopurinol, aripiprazole, eltrombopag  : A review of the medication list reveals no issues at this time.  Section completed by:  Ofelia Bowman RPH[TK.1]        Attribution Key     TK.1 - Ofelia Bowman RP on 3/10/2020 11:53 AM                  DC Planning  DC destination/dispostion:  To single level home with two step entry (no rails) and two interior steps from kitchen to family room (no rails and narrow), with support of wife. Patient's brother from [place] will install needed grab bars and arrive from [place] on Monday, March 16, 2020 to assist with transition to home.    Referrals: [facility] - RN/PT/OT/ST/SW. Ordering FWW.    DC Needs: MD f/u appointments - PCP, rehab clinic.    Barriers to discharge: Functional and cognitive deficits.       Strengths: Supportive wife.     Section completed by:  Monika Tellez RBAILEE      Physician Summary  Elba Chase MD participated and led team conference discussion.

## 2020-03-11 NOTE — DISCHARGE PLANNING
This TCS spoke with Kelly at Dr. Pineda's office and patient has not been since since 08/22/2017.  Patient will need an appointment to establish with a new PCP.  Thank you!

## 2020-03-12 ENCOUNTER — APPOINTMENT (OUTPATIENT)
Dept: RADIOLOGY | Facility: REHABILITATION | Age: 85
DRG: 057 | End: 2020-03-12
Attending: PHYSICAL MEDICINE & REHABILITATION
Payer: MEDICARE

## 2020-03-12 LAB
BACTERIA UR CULT: ABNORMAL
BACTERIA UR CULT: ABNORMAL
SIGNIFICANT IND 70042: ABNORMAL
SITE SITE: ABNORMAL
SOURCE SOURCE: ABNORMAL

## 2020-03-12 PROCEDURE — 97130 THER IVNTJ EA ADDL 15 MIN: CPT

## 2020-03-12 PROCEDURE — 700102 HCHG RX REV CODE 250 W/ 637 OVERRIDE(OP): Performed by: PHYSICAL MEDICINE & REHABILITATION

## 2020-03-12 PROCEDURE — 97129 THER IVNTJ 1ST 15 MIN: CPT

## 2020-03-12 PROCEDURE — 71045 X-RAY EXAM CHEST 1 VIEW: CPT

## 2020-03-12 PROCEDURE — A9270 NON-COVERED ITEM OR SERVICE: HCPCS | Performed by: PHYSICAL MEDICINE & REHABILITATION

## 2020-03-12 PROCEDURE — 97112 NEUROMUSCULAR REEDUCATION: CPT

## 2020-03-12 PROCEDURE — 97530 THERAPEUTIC ACTIVITIES: CPT | Mod: CO

## 2020-03-12 PROCEDURE — 97110 THERAPEUTIC EXERCISES: CPT | Mod: CO

## 2020-03-12 PROCEDURE — 770005 HCHG ROOM/CARE - REHAB PRIVATE (11*

## 2020-03-12 PROCEDURE — 99232 SBSQ HOSP IP/OBS MODERATE 35: CPT | Performed by: PHYSICAL MEDICINE & REHABILITATION

## 2020-03-12 PROCEDURE — 97116 GAIT TRAINING THERAPY: CPT

## 2020-03-12 RX ADMIN — QUETIAPINE 25 MG: 25 TABLET ORAL at 22:46

## 2020-03-12 RX ADMIN — ARIPIPRAZOLE 2.5 MG: 10 TABLET ORAL at 20:21

## 2020-03-12 RX ADMIN — CHLORHEXIDINE GLUCONATE 0.12% ORAL RINSE 15 ML: 1.2 LIQUID ORAL at 07:57

## 2020-03-12 RX ADMIN — SULFAMETHOXAZOLE AND TRIMETHOPRIM 1 TABLET: 800; 160 TABLET ORAL at 20:19

## 2020-03-12 RX ADMIN — MELATONIN 2000 UNITS: at 07:57

## 2020-03-12 RX ADMIN — SULFAMETHOXAZOLE AND TRIMETHOPRIM 1 TABLET: 800; 160 TABLET ORAL at 07:57

## 2020-03-12 RX ADMIN — ALLOPURINOL 300 MG: 300 TABLET ORAL at 07:57

## 2020-03-12 RX ADMIN — CHLORHEXIDINE GLUCONATE 0.12% ORAL RINSE 15 ML: 1.2 LIQUID ORAL at 20:24

## 2020-03-12 RX ADMIN — ASPIRIN 325 MG: 325 TABLET, COATED ORAL at 07:57

## 2020-03-12 RX ADMIN — MELATONIN 3 MG: at 20:23

## 2020-03-12 RX ADMIN — TRAZODONE HYDROCHLORIDE 50 MG: 50 TABLET ORAL at 20:23

## 2020-03-12 NOTE — DISCHARGE PLANNING
Spoke with patients wife to update her on Team meeting yesterday and go over her options for care of patient.  Team recommending SNF.  Wife is agreeable.  She would like a referral sent to the [facility] first and then [facility].  Per Team, patient needs 24/7 supervision and wife saw patient yesterday and agrees she is unable to care for him at home.  CM to get referral together to process.  CM available as needs arise.

## 2020-03-12 NOTE — THERAPY
"Occupational Therapy  Daily Treatment     Patient Name: Chilo Cabrera  Age:  84 y.o., Sex:  male  Medical Record #: [id number]  Today's Date: 3/12/2020     Precautions  Precautions: Fall Risk  Comments: left hemianopsia, impulsive, 1:1 sitter    Safety   ADL Safety : Requires Supervision for Safety, Impaired Insight into Safety, Requires Cueing for Safety  Bathroom Safety: Requires Supervision for Safety, Impaired Insight into Safety, Requires Cuing for Safety  Comments: see FIMs for ADL performance details.    Subjective    \" Go look over there for my billfold.\"   patient some what perseverative that billfold was in the therapy gym.  Reminded patient that his bill fold was either with family or locked in the safe.  He would redirect to task  This took place several times during tx session.      Objective       03/12/20 1301   Precautions   Precautions Fall Risk   Comments left hemianopsia, impulsive, 1:1 sitter   Sitting Upper Body Exercises   Chest Press 2 sets of 10;Bilateral   Shoulder Press 2 sets of 10;Bilateral   Internal Shoulder Rotation 2 sets of 10;Right ;Left   External Shoulder Rotation 2 sets of 10;Right ;Left   Bicep Curls 2 sets of 10;Right ;Left   Pronation / Supination 2 sets of 10;Right ;Left   Other Exercise 2 sets of 10;Bilateral  arm circles forward and back    Comments ther ex performed with 3lb weight    Sitting Lower Body Exercises   Nustep Resistance Level 4  (x 10 minutes  both UE and LEs  SBA and cues for transfer)   Interdisciplinary Plan of Care Collaboration   Patient Position at End of Therapy Seated;Self Releasing Lap Belt Applied  (hand off to ST for tx )   OT Total Time Spent   OT Individual Total Time Spent (Mins) 60   OT Charge Group   OT Therapy Activity 2   OT Therapeutic Exercise  2        seated at table working on compensatory strategies for visual scanning due to left hemianopsia.    worked on  Pulling pegs out of pegboard.  3 different colored pegs placed in locations " through out the board.  Instructed to pull out  1  The red pegs, 2 blue pegs 3 yellow pegs.  And place in container to the left .   Container and pegboard initially placed at midline with therapist gradually moving the items more to the left.    he required cues several times to turn his head to the left.  To locate requested colored pegs on the left and bottom boarder of the board.   He demonstrated some spontaneous head turn to the left and 2 times  Moved the pegboard to the right into improved line of site.       Assessment     completed tx with no complaints other than requesting this therapist look for his billfold.     Plan  Cont'd OT for neuro re-education, vision, balance, strength, endurance, education for safe ADL's and transfers.      eventual transfer to SNF

## 2020-03-12 NOTE — DISCHARGE PLANNING
[facility] per choice form.  Awaiting responses.

## 2020-03-12 NOTE — THERAPY
"Speech Language Pathology  Daily Treatment     Patient Name: Chilo Cabrera  Age:  84 y.o., Sex:  male  Medical Record #: [id number]  Today's Date: 3/12/2020     Subjective    \"I need to see my wife, shes right over there.\"     Objective       03/12/20 0903   SLP Total Time Spent   SLP Individual Total Time Spent (Mins) 30   Charge Group   SLP Cognitive Skill Development First 15 Minutes 1   SLP Cognitive Skill Development Additional 15 Minutes 1       Assessment    Pt perseverative re: need to see wife and stating that she was here however wife has not yet been present today. Redirected pt that it is still early and she is typically present around lunch time. Poor insight and awareness related to current deficits remains main barrier at this time. O-log initiated with decreased score of 12 noted (day prior completed with score of 19). On this date pt was not aware of WHY he is in the hospital whereas prior dates pt indep aware. Attention task completed with use of card sort into red and black piles. Pt completed with four errors noted, pt required MOD A to identify errors.     Plan    Simple attention, orientation, simple visual scanning.    "

## 2020-03-12 NOTE — THERAPY
Physical Therapy   Daily Treatment     Patient Name: Chilo Cabrera  Age:  84 y.o., Sex:  male  Medical Record #: [id number]  Today's Date: 3/12/2020     Precautions  Precautions: Fall Risk, Other (See Comments)  Comments: left hemianopsia, impulsive, 1:1 sitter    Subjective    Pt reports he is agreeable to walking      Objective       03/12/20 0972   Interdisciplinary Plan of Care Collaboration   Patient Position at End of Therapy Seated;Chair Alarm On;Self Releasing Lap Belt Applied   PT Total Time Spent   PT Individual Total Time Spent (Mins) 60   PT Charge Group   PT Gait Training 3   PT Neuromuscular Re-Education / Balance 1       Standing balance during donning and doffing harness with perturbations, no AD - no LOB able to maintain balance with cues    Vector training for walking no AD. Total of 570 ft including fwds, bckwds, side stepping, karaoke stepping, over foam and uneven surfaces, stepping over obstacles. Constant cues for direction and occasional assist provided by harness to prevent falls especially walking over foam and uneven surfaces.       Assessment    Pt demos improving balance and ambulation ability with no AD this session but does continue to demo LOB and falls without AD especially with more challenging tasks    Plan    DC? 10MWT, MAC

## 2020-03-12 NOTE — CARE PLAN
Problem: Communication  Goal: The ability to communicate needs accurately and effectively will improve  Note:    Pt mumbles and is difficult to understand. Pt needs extra time to communicate his needs to staff effectively.   Alarms in place.      Problem: Safety  Goal: Will remain free from injury  Note:   Pt. Is confused and impulsive. Yellow sign by the door, strip alarm on.

## 2020-03-12 NOTE — PROGRESS NOTES
"Rehab Progress Note     Date of Service: 3/12/2020  Chief Complaint: follow up stroke    Interval Events (Subjective)    Patient seen and evaluated today in his wheelchair.  He is up at the nursing station and is calm.  He understands plan for discharge on Saturday.  No after case management spoke to his wife yesterday.  She is unable to provide the help he needs currently at home so he will need to go to skilled nursing facility.  Advised the patient he does have a bladder infection and has been started on antibiotics.  He denies any dysuria.  Therapy reports his polyuria has improved.  Patient continues to sleep well.  He is no longer having any agitation.    Objective:  VITAL SIGNS: /68   Pulse 80   Temp 36.6 °C (97.9 °F) (Temporal)   Resp 18   Ht 1.951 m (6' 4.81\")   Wt 76.2 kg (168 lb 1.6 oz)   SpO2 94%   BMI 20.03 kg/m²   Gen: alert, no apparent distress  CV: regular rate and rhythm, no murmurs, no peripheral edema  Resp: clear to ascultation bilaterally, normal respiratory effort  GI: soft, non-tender abdomen, bowel sounds present  Neuro: notable for left visual field cut      Recent Results (from the past 72 hour(s))   Basic Metabolic Panel    Collection Time: 03/10/20  6:21 AM   Result Value Ref Range    Sodium 141 135 - 145 mmol/L    Potassium 3.8 3.6 - 5.5 mmol/L    Chloride 105 96 - 112 mmol/L    Co2 28 20 - 33 mmol/L    Glucose 117 (H) 65 - 99 mg/dL    Bun 14 8 - 22 mg/dL    Creatinine 0.89 0.50 - 1.40 mg/dL    Calcium 9.3 8.5 - 10.5 mg/dL    Anion Gap 8.0 0.0 - 11.9   CBC WITH DIFFERENTIAL    Collection Time: 03/10/20  6:21 AM   Result Value Ref Range    WBC 4.5 (L) 4.8 - 10.8 K/uL    RBC 3.99 (L) 4.70 - 6.10 M/uL    Hemoglobin 12.7 (L) 14.0 - 18.0 g/dL    Hematocrit 37.1 (L) 42.0 - 52.0 %    MCV 93.0 81.4 - 97.8 fL    MCH 31.8 27.0 - 33.0 pg    MCHC 34.2 33.7 - 35.3 g/dL    RDW 49.1 35.9 - 50.0 fL    Platelet Count 111 (L) 164 - 446 K/uL    MPV 11.8 9.0 - 12.9 fL    Neutrophils-Polys " 71.90 44.00 - 72.00 %    Lymphocytes 17.90 (L) 22.00 - 41.00 %    Monocytes 7.10 0.00 - 13.40 %    Eosinophils 2.00 0.00 - 6.90 %    Basophils 0.90 0.00 - 1.80 %    Immature Granulocytes 0.20 0.00 - 0.90 %    Nucleated RBC 0.00 /100 WBC    Neutrophils (Absolute) 3.25 1.82 - 7.42 K/uL    Lymphs (Absolute) 0.81 (L) 1.00 - 4.80 K/uL    Monos (Absolute) 0.32 0.00 - 0.85 K/uL    Eos (Absolute) 0.09 0.00 - 0.51 K/uL    Baso (Absolute) 0.04 0.00 - 0.12 K/uL    Immature Granulocytes (abs) 0.01 0.00 - 0.11 K/uL    NRBC (Absolute) 0.00 K/uL   ESTIMATED GFR    Collection Time: 03/10/20  6:21 AM   Result Value Ref Range    GFR If African American >60 >60 mL/min/1.73 m 2    GFR If Non African American >60 >60 mL/min/1.73 m 2       Current Facility-Administered Medications   Medication Frequency   • sulfamethoxazole-trimethoprim (BACTRIM DS) 800-160 MG tablet 1 Tab Q12HRS   • aripiprazole (ABILIFY) tablet 2.5 mg QHS   • melatonin tablet 3 mg QHS   • traZODone (DESYREL) tablet 50 mg QHS   • chlorhexidine (PERIDEX) 0.12 % solution 15 mL BID   • senna-docusate (PERICOLACE or SENOKOT S) 8.6-50 MG per tablet 2 Tab BID PRN    And   • polyethylene glycol/lytes (MIRALAX) PACKET 1 Packet QDAY PRN    And   • magnesium hydroxide (MILK OF MAGNESIA) suspension 30 mL QDAY PRN    And   • bisacodyl (DULCOLAX) suppository 10 mg QDAY PRN   • QUEtiapine (SEROQUEL) tablet 25 mg Q8HRS PRN   • vitamin D (cholecalciferol) tablet 2,000 Units DAILY   • Respiratory Therapy Consult Continuous RT   • Pharmacy Consult Request ...Pain Management Review 1 Each PHARMACY TO DOSE   • acetaminophen (TYLENOL) tablet 650 mg Q4HRS PRN   • hydrALAZINE (APRESOLINE) tablet 25 mg Q8HRS PRN   • artificial tears ophthalmic solution 1 Drop PRN   • benzocaine-menthol (CEPACOL) lozenge 1 Lozenge Q2HRS PRN   • mag hydrox-al hydrox-simeth (MAALOX PLUS ES or MYLANTA DS) suspension 20 mL Q2HRS PRN   • ondansetron (ZOFRAN ODT) dispertab 4 mg 4X/DAY PRN    Or   • ondansetron  (ZOFRAN) syringe/vial injection 4 mg 4X/DAY PRN   • sodium chloride (OCEAN) 0.65 % nasal spray 2 Spray PRN   • hydrOXYzine HCl (ATARAX) tablet 50 mg Q6HRS PRN   • lactulose 20 GM/30ML solution 30 mL QDAY PRN   • docusate sodium (ENEMEEZ) enema 283 mg QDAY PRN   • Eltrombopag Olamine TABS 50 mg DAILY   • allopurinol (ZYLOPRIM) tablet 300 mg DAILY   • aspirin EC (ECOTRIN) tablet 325 mg DAILY       Orders Placed This Encounter   Procedures   • Diet Order Regular     Standing Status:   Standing     Number of Occurrences:   1     Order Specific Question:   Diet:     Answer:   Regular [1]       Assessment:  Active Hospital Problems    Diagnosis   • *Cerebrovascular accident (CVA) due to thrombosis of right posterior cerebral artery (HCC)   • Vitamin D deficiency   • Visual field cut   • Cognitive deficits   • Essential hypertension   • History of ITP   • History of gout     This patient is a 84 y.o. male admitted for acute inpatient rehabilitation with Cerebrovascular accident (CVA) due to thrombosis of right posterior cerebral artery (HCC).    I led and attended the weekly conference, and agree with the IDT conference documentation and plan of care as noted below.    Date of conference: 3/11/2020    Goals and barriers: See IDT note.    Biggest barriers: confusion, impulsive, left visual field cut    Goals in next week: discharge    CM/social support: wife supportive, but likely can't physically assist    Anticipated DC date: 3/14/2020, if wife can assist at home, due to her own mobility limitations    Home health: PT/OT/SLP/RN    Equip: FWW    Follow up: PCP, rehab clinic       Medical Decision Making and Plan:    Right PCA stroke  Left homonymous hemianopsia, continues  Left pronator drift  Dominant  Cognitive deficits, severe  Continue full rehab program  PT/OT/SLP, 1 hr each discipline, 5 days per week  Continue aspirin for secondary stroke prophylaxis  Statin not indicated, LDL 32    Encephalopathy,  improved  Agitation, resolved  Negative UA x 2, second one +culture, started on antibiotics   Melatonin, trazodone, seroquel not effective  Scheduled low dose Abilify, in addition to melatonin and trazodone, sleeping well on this combination  Discontinued 1:1 3/11    Mild tachycardia, improved today  Intermittent hypertension  Could be due to his UTI  Continue to monitor    Halitosis  Continue Peridex, oral care    History of gout  Continue allopurinol    History of Immune thrombocytopenia  Continue home medication, Eltrombopag  Monitor labs, stable on recheck x 2    Normocytic anemia  Mild, stable    Vit D deficiency, 15  Continue supplementation    Hypokalemia, resolved  Given one dose of potassium    Bowel  Constipation, resolved  As needed bowel meds  Last BM 3/10    Bladder  Check PVRs -59, 149, 50, 96  Not retaining  ICP for over 400 cc  Scheduled toileting     UTI/polyuria  Sent culture, Grp D enterococcus, start on Bactrim, sensitivities pending    DVT prophylaxis  Discontinued Lovenox as he is ambulating long distances    Total time:  26 minutes.  I spent greater than 50% of the time for patient care, counseling, and coordination on this date, including patient face-to face time, unit/floor time with review of records/pertinent lab data and studies, as well as discussing diagnostic evaluation/work up, planned therapeutic interventions, and future disposition of care, as per the interval events/subjective and the assessment and plan as noted above.    I have performed a physical exam, reviewed and updated ROS, as well as the assessment and plan today 3/12/2020. In review of note from 3/11/2020 there are no new changes except as documented above.    Elba Chase M.D.   Physical Medicine and Rehabilitation

## 2020-03-12 NOTE — DISCHARGE PLANNING
"CM spoke with patient about teams recommendation to go to SNF for continued therapies/wife cannot take care of him in current condition.  Patient expressed understanding and agreeable.  He stated \"I'm leaving there after 4 days\".  CM offered support and is available as needs arise.  CM updated Dr. Chase and Brent in PT.    "

## 2020-03-12 NOTE — THERAPY
"Speech Language Pathology  Daily Treatment     Patient Name: Chilo Cabrera  Age:  84 y.o., Sex:  male  Medical Record #: [id number]  Today's Date: 3/12/2020     Subjective    \"in two days I go home.\"     Objective       03/12/20 1403   SLP Total Time Spent   SLP Individual Total Time Spent (Mins) 30   Charge Group   SLP Cognitive Skill Development First 15 Minutes 1   SLP Cognitive Skill Development Additional 15 Minutes 1     Assessment    Pt with limited insight and awareness re: d/c stating he can go home without difficulty. When discussed potential transfer to veterans or skilled faciliy pt stated \"absolutely not.\" Sustained attention with two units completed with greater than 90% accuracy indep, once increased to 4 units accuracy decreased to 50% and required MAX A to achieve 100% with cues to attend to the left side of the table and targets.   Plan    Simple orientation, attention and ps - d/c planning    "

## 2020-03-12 NOTE — CARE PLAN
Problem: Communication  Goal: The ability to communicate needs accurately and effectively will improve  Outcome: PROGRESSING SLOWER THAN EXPECTED  Note: Pt mumbles and is difficult to understand. Pt will need extra time to communicate his needs to staff effectively.     Problem: Safety  Goal: Will remain free from injury  Outcome: PROGRESSING SLOWER THAN EXPECTED  Note: Pt is impulsive with alarms in place for safety.     Problem: Infection  Goal: Will remain free from infection  Outcome: PROGRESSING AS EXPECTED  Note: No s/s of infection present

## 2020-03-12 NOTE — DISCHARGE PLANNING
ATTN: Case Management  RE: Referral for Home Health    Reason for referral denial: Patient going to SNF              Unfortunately, we are not able to accept this referral for the reason listed above. If further clarity is needed, our Transitional Care Specialists are available to discuss any barriers to service at [phone].      We look forward to collaborating with you in the future,  [facility] Team

## 2020-03-13 PROBLEM — N30.00 ACUTE CYSTITIS WITHOUT HEMATURIA: Status: ACTIVE | Noted: 2020-03-13

## 2020-03-13 PROCEDURE — 97130 THER IVNTJ EA ADDL 15 MIN: CPT

## 2020-03-13 PROCEDURE — 99232 SBSQ HOSP IP/OBS MODERATE 35: CPT | Performed by: PHYSICAL MEDICINE & REHABILITATION

## 2020-03-13 PROCEDURE — 97110 THERAPEUTIC EXERCISES: CPT

## 2020-03-13 PROCEDURE — 770005 HCHG ROOM/CARE - REHAB PRIVATE (11*

## 2020-03-13 PROCEDURE — 700102 HCHG RX REV CODE 250 W/ 637 OVERRIDE(OP): Performed by: PHYSICAL MEDICINE & REHABILITATION

## 2020-03-13 PROCEDURE — A9270 NON-COVERED ITEM OR SERVICE: HCPCS | Performed by: PHYSICAL MEDICINE & REHABILITATION

## 2020-03-13 PROCEDURE — 97116 GAIT TRAINING THERAPY: CPT

## 2020-03-13 PROCEDURE — 97530 THERAPEUTIC ACTIVITIES: CPT

## 2020-03-13 PROCEDURE — 97129 THER IVNTJ 1ST 15 MIN: CPT

## 2020-03-13 PROCEDURE — 97535 SELF CARE MNGMENT TRAINING: CPT

## 2020-03-13 RX ORDER — POLYETHYLENE GLYCOL 3350 17 G/17G
17 POWDER, FOR SOLUTION ORAL DAILY
Refills: 3
Start: 2020-03-14

## 2020-03-13 RX ORDER — ARIPIPRAZOLE 5 MG/1
2.5 TABLET ORAL
Qty: 30 TAB
Start: 2020-03-13

## 2020-03-13 RX ORDER — TRAZODONE HYDROCHLORIDE 50 MG/1
50 TABLET ORAL
Qty: 30 TAB | Refills: 3
Start: 2020-03-13

## 2020-03-13 RX ORDER — AMOXICILLIN 250 MG
2 CAPSULE ORAL 2 TIMES DAILY
Qty: 30 TAB | Refills: 0
Start: 2020-03-13

## 2020-03-13 RX ORDER — QUETIAPINE FUMARATE 25 MG/1
25 TABLET, FILM COATED ORAL EVERY 8 HOURS PRN
Qty: 60 TAB | Refills: 3
Start: 2020-03-13

## 2020-03-13 RX ORDER — NITROFURANTOIN 25; 75 MG/1; MG/1
100 CAPSULE ORAL 2 TIMES DAILY WITH MEALS
Status: DISCONTINUED | OUTPATIENT
Start: 2020-03-13 | End: 2020-03-14 | Stop reason: HOSPADM

## 2020-03-13 RX ORDER — HYDROXYZINE 50 MG/1
50 TABLET, FILM COATED ORAL EVERY 6 HOURS PRN
Qty: 30 TAB | Refills: 0
Start: 2020-03-13

## 2020-03-13 RX ORDER — LANOLIN ALCOHOL/MO/W.PET/CERES
3 CREAM (GRAM) TOPICAL
Qty: 30 TAB
Start: 2020-03-13

## 2020-03-13 RX ORDER — NITROFURANTOIN 25; 75 MG/1; MG/1
100 CAPSULE ORAL 2 TIMES DAILY WITH MEALS
Qty: 8 CAP | Refills: 0
Start: 2020-03-13 | End: 2020-03-17

## 2020-03-13 RX ORDER — ELTROMBOPAG OLAMINE 50 MG/1
1 TABLET, FILM COATED ORAL DAILY
Qty: 30 TAB
Start: 2020-03-14

## 2020-03-13 RX ORDER — BISACODYL 10 MG
10 SUPPOSITORY, RECTAL RECTAL
Status: DISCONTINUED | OUTPATIENT
Start: 2020-03-13 | End: 2020-03-14 | Stop reason: HOSPADM

## 2020-03-13 RX ORDER — AMOXICILLIN 250 MG
2 CAPSULE ORAL 2 TIMES DAILY
Status: DISCONTINUED | OUTPATIENT
Start: 2020-03-13 | End: 2020-03-14 | Stop reason: HOSPADM

## 2020-03-13 RX ORDER — POLYETHYLENE GLYCOL 3350 17 G/17G
1 POWDER, FOR SOLUTION ORAL DAILY
Status: DISCONTINUED | OUTPATIENT
Start: 2020-03-13 | End: 2020-03-14 | Stop reason: HOSPADM

## 2020-03-13 RX ORDER — ASPIRIN 325 MG
325 TABLET, DELAYED RELEASE (ENTERIC COATED) ORAL DAILY
Qty: 60 TAB
Start: 2020-03-14

## 2020-03-13 RX ORDER — ALLOPURINOL 300 MG/1
300 TABLET ORAL DAILY
Qty: 30 TAB
Start: 2020-03-14

## 2020-03-13 RX ORDER — VITAMIN B COMPLEX
1000 TABLET ORAL DAILY
Start: 2020-03-14

## 2020-03-13 RX ADMIN — CHLORHEXIDINE GLUCONATE 0.12% ORAL RINSE 15 ML: 1.2 LIQUID ORAL at 08:33

## 2020-03-13 RX ADMIN — LACTULOSE 30 ML: 20 SOLUTION ORAL at 20:39

## 2020-03-13 RX ADMIN — SENNOSIDES AND DOCUSATE SODIUM 2 TABLET: 8.6; 5 TABLET ORAL at 20:37

## 2020-03-13 RX ADMIN — QUETIAPINE 25 MG: 25 TABLET ORAL at 18:25

## 2020-03-13 RX ADMIN — SENNOSIDES AND DOCUSATE SODIUM 2 TABLET: 8.6; 5 TABLET ORAL at 02:22

## 2020-03-13 RX ADMIN — NITROFURANTOIN MONOHYDRATE/MACROCRYSTALLINE 100 MG: 25; 75 CAPSULE ORAL at 11:24

## 2020-03-13 RX ADMIN — HYDROXYZINE HYDROCHLORIDE 50 MG: 25 TABLET, FILM COATED ORAL at 17:37

## 2020-03-13 RX ADMIN — POLYETHYLENE GLYCOL 3350 1 PACKET: 17 POWDER, FOR SOLUTION ORAL at 11:24

## 2020-03-13 RX ADMIN — MELATONIN 2000 UNITS: at 08:33

## 2020-03-13 RX ADMIN — TRAZODONE HYDROCHLORIDE 50 MG: 50 TABLET ORAL at 20:38

## 2020-03-13 RX ADMIN — HYDROXYZINE HYDROCHLORIDE 50 MG: 25 TABLET, FILM COATED ORAL at 08:33

## 2020-03-13 RX ADMIN — MELATONIN 3 MG: at 20:36

## 2020-03-13 RX ADMIN — ARIPIPRAZOLE 2.5 MG: 10 TABLET ORAL at 20:36

## 2020-03-13 RX ADMIN — CHLORHEXIDINE GLUCONATE 0.12% ORAL RINSE 15 ML: 1.2 LIQUID ORAL at 20:41

## 2020-03-13 RX ADMIN — NITROFURANTOIN MONOHYDRATE/MACROCRYSTALLINE 100 MG: 25; 75 CAPSULE ORAL at 17:38

## 2020-03-13 RX ADMIN — ASPIRIN 325 MG: 325 TABLET, COATED ORAL at 08:33

## 2020-03-13 RX ADMIN — ALLOPURINOL 300 MG: 300 TABLET ORAL at 08:33

## 2020-03-13 RX ADMIN — SULFAMETHOXAZOLE AND TRIMETHOPRIM 1 TABLET: 800; 160 TABLET ORAL at 08:33

## 2020-03-13 ASSESSMENT — 10 METER WALK TEST (10METWT)
TRIAL 1: TIME TO WALK 10 METERS: 6
AVERAGE TIME - SECONDS: 17
TRIAL 1: TIME TO WALK 10 METERS: 18
AVERAGE VELOCITY - METERS PER SECOND: .35
TRIAL 2: TIME TO WALK 10 METERS: 16
AVERAGE VELOCITY - METERS PER SECOND: .86
TRIAL 3: TIME TO WALK 10 METERS: 8
TRIAL 3: TIME TO WALK 10 METERS: 17
TRIAL 2: TIME TO WALK 10 METERS: 7

## 2020-03-13 NOTE — THERAPY
Physical Therapy   Daily Treatment     Patient Name: Chilo Cabrera  Age:  84 y.o., Sex:  male  Medical Record #: [id number]  Today's Date: 3/13/2020     Precautions  Precautions: Fall Risk  Comments: left hemianopsia, impulsive, 1:1 sitter    Subjective    Pt very confused this session - can not recall his  wifes name     Objective       03/13/20 1231   10 Meter Walk Test   Normal - Trial 1 18 seconds   Normal - Trial 2 16 seconds   Normal - Trial 3 17 seconds   Fast - Trial 1 6 seconds   Fast - Trial 2 7 seconds   Fast - Trial 3 8 seconds   Normal Average Time 17 seconds   Normal Average Velocity (m/s) 0.35   Fast Average Time 7 seconds   Fast Average Velocity (m/s) 0.86   Interdisciplinary Plan of Care Collaboration   Patient Position at End of Therapy Seated;Self Releasing Lap Belt Applied;Chair Alarm On  (with nursing at nurses station)   PT Total Time Spent   PT Individual Total Time Spent (Mins) 60   PT Charge Group   PT Gait Training 2   PT Therapeutic Activities 2       FIM Bed/Chair/Wheelchair Transfers Score: 5 - Standby Prompting/Supervision or Set-up  Bed/Chair/Wheelchair Transfers Description:       FIM Walking Score:  4 - Minimal Assistance  Walking Description:  Walker, Extra time, Safety concerns, Requires incidental assist    FIM Wheelchair Score:  4 - Minimal Assistance  Wheelchair Description:       FIM Stairs Score:  4 - Minimal Assistance  Stairs Description:  Ascends/descends 12 to 14 steps, Requires incidental assist, Extra time, Safety concerns, Verbal cueing, Hand rails    See IRFPAI for DC assessment results    Assessment    Pt demos min A level mobility for DC assessment. Pt safe to DC to SNF with 24 hr care from staff . Ongoing skilled PT in SNF setting recommended for continue improvement of strength, balance, activity tolerance and decreasing risk for falls and rehospitilization at home and in the community.      Plan    DC SNF

## 2020-03-13 NOTE — PROGRESS NOTES
Received patient during shift change, report rec'd from day shift RN. Sitting up in w/c near nurses station, VS stable on room air. Per report continent of B&[facility], stand by assist for transfers. A&O x 2, able to make needs known. No s/s of distress.

## 2020-03-13 NOTE — DISCHARGE PLANNING
Per Lito at [facility], referral accepted pending bed availability. He doesn't anticipate a bed available for 2 - 3 weeks.  Per Riya at [facility], referral accepted.  Dr. Kennedy is accepting physician.  [facility] to transport patient to pt to [facility] tomorrow, 3/14/20 at 1300. Transport arranged with Tiana at [facility] dispatch.  VICTOR M notified and Riya at [facility] notified.

## 2020-03-13 NOTE — THERAPY
"Speech Language Pathology  Daily Treatment     Patient Name: Chilo Cabrera  Age:  84 y.o., Sex:  male  Medical Record #: [id number]  Today's Date: 3/13/2020     Subjective    \"Trump is walking through the hallways shaking hands with everyone.\"     Objective       03/13/20 1003   SLP Total Time Spent   SLP Individual Total Time Spent (Mins) 30   Charge Group   SLP Cognitive Skill Development First 15 Minutes 1   SLP Cognitive Skill Development Additional 15 Minutes 1       Assessment    Increased confusion noted at this time. Speech decreased in intelligibility however pt with often off topic comments making it hard for SLP to follow. Orientation log completed with decreased score of 9/30. MAX A required for sustained attention task.     Plan    Simple orientation, attention and functional ps  "

## 2020-03-13 NOTE — DISCHARGE PLANNING
Patient accepted to [facility] for tomorrow.  Transport set up with [facility] for 1300.  Contact is Tiana.  VICTOR M spoke with patients wife.  [facility] will work with the [facility] to get patient there as soon as they have a bed.  CM let Unit Clerk know about transfer tomorrow.  CM updated Dr. Chase.  Patients wife will be visiting patient later this afternoon.  COBRA and HARSHAL Instructions on chart.

## 2020-03-13 NOTE — DISCHARGE PLANNING
Case Management Discharge Instructions        Discharge Location: Skilled Nursing Facility     Agency Name / Address / Phone: [facility]. [address] [place]  [id number]  ([phone]) [facility]  Van to Transfer at 1PM -Wife notified     Follow-up Information:     Susan Pineda D.O.  [address] [facility]  [place] [id number] [phone]  [facility] will schedule appointment when you discharge from them.       Grace Yeh D.O.  [address] [facility] [id number] [phone]  On 3/30/2020  Physical Medicine - Monday, 10AM appointment, 9:30AM check in.

## 2020-03-13 NOTE — CARE PLAN
Problem: Mobility  Goal: STG-Within one week, patient will ambulate household distance  Description: 1) Individualized goal:  150 ft with FWW and SBA.  2) Interventions:  PT Group Therapy, PT Gait Training, PT Therapeutic Exercises, PT Neuro Re-Ed/Balance, PT Therapeutic Activity and PT Evaluation     Outcome: NOT MET  Note: Min A  Goal: STG-Within one week, patient will ambulate up/down a curb  Description: 1) Individualized goal:  With FWW and CGA.  2) Interventions:  PT Group Therapy, PT Gait Training, PT Therapeutic Exercises, PT Neuro Re-Ed/Balance, PT Therapeutic Activity and PT Evaluation   Outcome: NOT MET  Note: Min A  Goal: STG-Within one week, patient will ascend and descend four to six stairs  Description: 1) Individualized goal:  With B rails and CGA.  2) Interventions:  PT Group Therapy, PT Gait Training, PT Therapeutic Exercises, PT Neuro Re-Ed/Balance, PT Therapeutic Activity and PT Evaluation     Outcome: NOT MET  Note: Min A     Problem: PT-Long Term Goals  Goal: LTG-By discharge, patient will ambulate  Description: 1) Individualized goal:  150 ft with LRAD and supervision.  2) Interventions: PT Group Therapy, PT Gait Training, PT Therapeutic Exercises, PT Neuro Re-Ed/Balance, PT Therapeutic Activity and PT Evaluation     Outcome: NOT MET  Note: Min A  Goal: LTG-By discharge, patient will transfer one surface to another  Description: 1) Individualized goal:  With LRAD mod I.  2) Interventions:  PT Group Therapy, PT Gait Training, PT Therapeutic Exercises, PT Neuro Re-Ed/Balance, PT Therapeutic Activity and PT Evaluation     Outcome: NOT MET  Note: SPV  Goal: LTG-By discharge, patient will ambulate up/down 4-6 stairs  Description: 1) Individualized goal:  With B rails and supervision.  2) Interventions:  PT Group Therapy, PT Gait Training, PT Therapeutic Exercises, PT Neuro Re-Ed/Balance, PT Therapeutic Activity and PT Evaluation     Outcome: NOT MET  Note: Min A  Goal: LTG-By discharge, patient  will transfer in/out of a car  Description: 1) Individualized goal:  With LRAD and supervision.  2) Interventions:  PT Group Therapy, PT Gait Training, PT Therapeutic Exercises, PT Neuro Re-Ed/Balance, PT Therapeutic Activity and PT Evaluation   Outcome: NOT MET  Note: Min A  Goal: LTG-By discharge, patient will  Description: 1) Individualized goal:  Perform bed mobility with no adaptations independently.  2) Interventions: PT Group Therapy, PT Gait Training, PT Therapeutic Exercises, PT Neuro Re-Ed/Balance, PT Therapeutic Activity and PT Evaluation     Outcome: NOT MET  Note: SPV  Goal: LTG-By discharge, patient will  Description: 1) Individualized goal:  Navigate up/down curb with LRAD and supervision.  2) Interventions:  PT Group Therapy, PT Gait Training, PT Therapeutic Exercises, PT Neuro Re-Ed/Balance, PT Therapeutic Activity and PT Evaluation     Outcome: NOT MET  Note: Min A

## 2020-03-13 NOTE — CARE PLAN
Problem: Speech/Swallowing LTGs  Goal: LTG-By discharge, patient will  Description: 1) Individualized goal:  Solve basic to moderate problems related to health and safety using external memory aids when needed with 80% accuracy and MIN A.   2) Interventions:  SLP Self Care / ADL Training  and SLP Cognitive Skill Development, Group Treatment     Outcome: NOT MET     Problem: Problem Solving STGs  Goal: STG-Within one week, patient will  Description: 1) Individualized goal:  Complete basic single target scanning activities with 80% accuracy and MIN A.   2) Interventions:  SLP Self Care / ADL Training , SLP Cognitive Skill Development and SLP Group Treatment     Outcome: NOT MET     Problem: Memory STGs  Goal: STG-Within one week, patient will  Description: 1) Individualized goal:  Utilize external memory aids for orientation to date and daily therapy activities with 80% acc and MOD A.   2) Interventions:  SLP Self Care / ADL Training , SLP Cognitive Skill Development and SLP Group Treatment     Outcome: NOT MET

## 2020-03-13 NOTE — PROGRESS NOTES
"Rehab Progress Note     Date of Service: 3/13/2020  Chief Complaint: follow up stroke    Interval Events (Subjective)    Patient seen and evaluated in the therapy gym today. He is working on a left sided visual functional task with OT. He did not sleep well last night, is confused today, but not agitated. Advised patient urine culture came back and his antibiotics have been changed. He denies any pain. He has no other complaints.     Objective:  VITAL SIGNS: /78   Pulse 72   Temp 36.4 °C (97.5 °F) (Oral)   Resp 18   Ht 1.951 m (6' 4.81\")   Wt 76.2 kg (168 lb 1.6 oz)   SpO2 95%   BMI 20.03 kg/m²   Gen: alert, no apparent distress  CV: regular rate and rhythm, no murmurs, no peripheral edema  Resp: clear to ascultation bilaterally, normal respiratory effort  GI: soft, non-tender abdomen, bowel sounds present  Neuro: notable for disorientation, left visual field cut      No results found for this or any previous visit (from the past 72 hour(s)).    Current Facility-Administered Medications   Medication Frequency   • nitrofurantoin (MACROBID) capsule 100 mg BID WITH MEALS   • aripiprazole (ABILIFY) tablet 2.5 mg QHS   • melatonin tablet 3 mg QHS   • traZODone (DESYREL) tablet 50 mg QHS   • chlorhexidine (PERIDEX) 0.12 % solution 15 mL BID   • senna-docusate (PERICOLACE or SENOKOT S) 8.6-50 MG per tablet 2 Tab BID PRN    And   • polyethylene glycol/lytes (MIRALAX) PACKET 1 Packet QDAY PRN    And   • magnesium hydroxide (MILK OF MAGNESIA) suspension 30 mL QDAY PRN    And   • bisacodyl (DULCOLAX) suppository 10 mg QDAY PRN   • QUEtiapine (SEROQUEL) tablet 25 mg Q8HRS PRN   • vitamin D (cholecalciferol) tablet 2,000 Units DAILY   • Respiratory Therapy Consult Continuous RT   • Pharmacy Consult Request ...Pain Management Review 1 Each PHARMACY TO DOSE   • acetaminophen (TYLENOL) tablet 650 mg Q4HRS PRN   • hydrALAZINE (APRESOLINE) tablet 25 mg Q8HRS PRN   • artificial tears ophthalmic solution 1 Drop PRN   • " benzocaine-menthol (CEPACOL) lozenge 1 Lozenge Q2HRS PRN   • mag hydrox-al hydrox-simeth (MAALOX PLUS ES or MYLANTA DS) suspension 20 mL Q2HRS PRN   • ondansetron (ZOFRAN ODT) dispertab 4 mg 4X/DAY PRN    Or   • ondansetron (ZOFRAN) syringe/vial injection 4 mg 4X/DAY PRN   • sodium chloride (OCEAN) 0.65 % nasal spray 2 Spray PRN   • hydrOXYzine HCl (ATARAX) tablet 50 mg Q6HRS PRN   • lactulose 20 GM/30ML solution 30 mL QDAY PRN   • docusate sodium (ENEMEEZ) enema 283 mg QDAY PRN   • Eltrombopag Olamine TABS 50 mg DAILY   • allopurinol (ZYLOPRIM) tablet 300 mg DAILY   • aspirin EC (ECOTRIN) tablet 325 mg DAILY       Orders Placed This Encounter   Procedures   • Diet Order Regular     Standing Status:   Standing     Number of Occurrences:   1     Order Specific Question:   Diet:     Answer:   Regular [1]       Assessment:  Active Hospital Problems    Diagnosis   • *Cerebrovascular accident (CVA) due to thrombosis of right posterior cerebral artery (HCC)   • Vitamin D deficiency   • Visual field cut   • Cognitive deficits   • Essential hypertension   • History of ITP   • History of gout     This patient is a 84 y.o. male admitted for acute inpatient rehabilitation with Cerebrovascular accident (CVA) due to thrombosis of right posterior cerebral artery (HCC).    I led and attended the weekly conference, and agree with the IDT conference documentation and plan of care as noted below.    Date of conference: 3/11/2020    Goals and barriers: See IDT note.    Biggest barriers: confusion, impulsive, left visual field cut    Goals in next week: discharge    CM/social support: wife supportive, but likely can't physically assist    Anticipated DC date: 3/14/2020, if wife can assist at home, due to her own mobility limitations    Home health: PT/OT/SLP/RN    Equip: FWW    Follow up: PCP, rehab clinic       Medical Decision Making and Plan:    Right PCA stroke  Left homonymous hemianopsia, continues  Left pronator  drift  Dominant  Cognitive deficits, severe  Continue full rehab program  PT/OT/SLP, 1 hr each discipline, 5 days per week  Continue aspirin for secondary stroke prophylaxis  Statin not indicated, LDL 32    Encephalopathy, improved  Agitation, resolved  Negative UA x 2, second one +culture, started on antibiotics - sensitivities back today, changed to Macrobid  Melatonin, trazodone, seroquel not effective  Scheduled low dose Abilify, in addition to melatonin and trazodone, sleeping well on this combination  Discontinued 1:1 3/11    Mild tachycardia, improved  Likely due to UTI  Continue to monitor    Halitosis  Continue Peridex, oral care    History of gout  Continue allopurinol    History of Immune thrombocytopenia  Continue home medication, Eltrombopag  Monitor labs, stable on recheck x 2    Normocytic anemia  Mild, stable    Vit D deficiency, 15  Continue supplementation    Hypokalemia, resolved  Given one dose of potassium    Bowel  Constipation, continues  Increase bowel meds  Last BM 3/10    Bladder  Check PVRs -59, 149, 50, 96  Not retaining  ICP for over 400 cc  Scheduled toileting     UTI/polyuria  Sent culture, Grp D enterococcus, started on Bactrim, sensitivities back, changed to Macrobid    DVT prophylaxis  Discontinued Lovenox as he is ambulating long distances    Total time:  27 minutes.  I spent greater than 50% of the time for patient care, counseling, and coordination on this date, including patient face-to face time, unit/floor time with review of records/pertinent lab data and studies, as well as discussing diagnostic evaluation/work up, planned therapeutic interventions, and future disposition of care, as per the interval events/subjective and the assessment and plan as noted above.    I have performed a physical exam, reviewed and updated ROS, as well as the assessment and plan today 3/13/2020. In review of note from 3/12/2020 there are no new changes except as documented above.          [place]  KEHINDE Chase M.D.   Physical Medicine and Rehabilitation

## 2020-03-13 NOTE — CARE PLAN
Problem: Communication  Goal: The ability to communicate needs accurately and effectively will improve  Outcome: PROGRESSING AS EXPECTED     Problem: Safety  Goal: Will remain free from injury  Outcome: PROGRESSING AS EXPECTED     Problem: Respiratory:  Goal: Respiratory status will improve  Outcome: PROGRESSING AS EXPECTED     Problem: Urinary Elimination:  Goal: Ability to reestablish a normal urinary elimination pattern will improve  Outcome: PROGRESSING AS EXPECTED

## 2020-03-13 NOTE — THERAPY
Occupational Therapy  Daily Treatment     Patient Name: Chilo Cabrera  Age:  84 y.o., Sex:  male  Medical Record #: [id number]  Today's Date: 3/13/2020     Precautions  Precautions: (P) Fall Risk  Comments: (P) left hemianopsia, impulsive, 1:1 sitter    Safety   ADL Safety : (P) Impaired, Impulsive, Requires Supervision for Safety, Impaired Insight into Safety, Requires Cueing for Safety  Bathroom Safety: (P) Impaired, Impulsive, Requires Supervision for Safety, Impaired Insight into Safety, Requires Cuing for Safety  Comments: (P) see FIMs for ADL performance details.    Subjective    Pt received seated at nurses station. Pt with increased confusion this session, requires max cues for redirection.      Objective       03/13/20 0901   Precautions   Precautions Fall Risk   Comments left hemianopsia, impulsive, 1:1 sitter   Safety    ADL Safety  Impaired;Impulsive;Requires Supervision for Safety;Impaired Insight into Safety;Requires Cueing for Safety   Bathroom Safety Impaired;Impulsive;Requires Supervision for Safety;Impaired Insight into Safety;Requires Cuing for Safety   Comments see FIMs for ADL performance details.   Cognition    Level of Consciousness Alert   Ability To Follow Commands 1 Step   Safety Awareness Impaired;Impulsive   Attention Impaired   Sitting Lower Body Exercises   Nustep Resistance Level 4  (for endurance training x15 min, 2 RB, cues for pacing)   Dynavision   Patient Position Sitting   Application Attention regulation;Laterality/directionality;Visual-motor integration;Self-awareness;Visual-spatial integration   Mode A   Time per trial (sec) 60   Number of Rings   (5)   Quadrants LLQ;RLQ   Number of Hits 5  (4 (trial 2), 9 (trial 3))   Average Reaction Time 12.00 sec (trial 1)  (15.00 sec (trial 2), 6.67 sec (trial 3))   Clinical Observations Coordination impaired;Other  (left field cut)   Interdisciplinary Plan of Care Collaboration   Patient Position at End of Therapy Seated;Chair Alarm  On;Self Releasing Lap Belt Applied  (seated at nurses station d/t impulsivity)   OT Total Time Spent   OT Individual Total Time Spent (Mins) 60   OT Charge Group   OT Self Care / ADL 1   OT Therapy Activity 2   OT Therapeutic Exercise  1       FIM Grooming Score:  5 - Standby Prompting/Supervision or Set-up  Grooming Description:  Increased time, Supervision for safety, Verbal cueing, Set-up of equipment    FIM Upper Body Dressin - Standby Prompting/Supervision or Set-up  Upper Body Dressing Description:  Supervision for safety, Verbal cueing, Set-up of equipment    FIM Lower Body Dressing Score:  5 - Standby Prompting/Supervsion or Set-up  Lower Body Dressing Description:  Increased time, Supervision for safety, Verbal cueing, Set-up of equipment    FIM Toiletin - Standby Prompting/Supervision or Set-up  Toileting Description:  Grab bar, Increased time, Supervision for safety, Verbal cueing, Set-up of equipment    FIM Toilet Transfer Score:  5 - Standby Prompting/Supervision or Set-up  Toilet Transfer Description:  Grab bar, Increased time, Supervision for safety, Verbal cueing, Set-up of equipment(SBA for w/c<>toilet SPT with GB; cues for safety.)    Seated at tabletop, visual scanning and coordination activity for various colored clothespins on left side, and placement onto vertical rung. Pt completed x10 reps with max cues and intermittent [place] assist for head turns to locate items on left side.       Assessment    Pt demonstrated increased confusion this session and required max cues and intermittent [place] for locating stimuli on left side. Pt con't to be highly impulsive, and limited by impaired balance, decreased insight into deficits, L visual field cut.     Plan    Pt to d/c to SNF tomorrow.

## 2020-03-13 NOTE — THERAPY
"Recreational Therapy  Daily Treatment     Patient Name: Chilo Cabrera  AGE:  84 y.o., SEX:  male  Medical Record #: [id number]  Today's Date: 3/13/2020       Subjective    \"Not a good day, not a bad day.\"     Objective       03/12/20 1501   Functional Ability Status - Cognitive   Attention Span Remains on Task   Comprehension Follows One Step Commands   Judgment Able to Make Independent Decisions   Functional Ability Status - Emotional    Affect Appropriate   Mood Appropriate   Behavior Appropriate   Skilled Intervention    Skilled Intervention Cognitive Leisure;Fine Motor Leisure   Skilled Intervention Comments Shape sorting activity   Interdisciplinary Plan of Care Collaboration   Patient Position at End of Therapy Seated;Other (Comments)  (at nurses station)   Treatment Time   Total Time Spent (mins) 30   Procedural Tracking   Procedural Tracking Cognitive Skills Training       Assessment    Pt was given a sorting activity in which they needed to place shapes with varying holes on posts. He was able to place 4 pieces with 4 holes and 4 pieces with 1 hole. Pt struggled with the three holed triangle using all three holes. He placed it only using two holes however did state that he knew it looked incorrect.     Plan    Continue sorting activities.   "

## 2020-03-13 NOTE — DISCHARGE SUMMARY
Rehab Discharge Note    Admission: 2/28/2020    Discharge: 3/14/2020    Admission Diagnosis:   Active Hospital Problems    Diagnosis   • *Cerebrovascular accident (CVA) due to thrombosis of right posterior cerebral artery (HCC)   • Acute cystitis without hematuria   • Vitamin D deficiency   • Visual field cut   • Cognitive deficits   • Essential hypertension   • History of ITP   • History of gout       Discharge Diagnosis:  Active Hospital Problems    Diagnosis   • *Cerebrovascular accident (CVA) due to thrombosis of right posterior cerebral artery (HCC)   • Acute cystitis without hematuria   • Vitamin D deficiency   • Visual field cut   • Cognitive deficits   • Essential hypertension   • History of ITP   • History of gout       HPI prior to rehab  The patient is a 84 y.o. male with a past medical history of immune thrombocytopenia, TIAs, carotid artery stenosis, hypertension, cataracts; now admitted for acute inpatient rehabilitation with severe functional debility from acute stroke.       On admission the patient and medical record report initially presented to the hospital on 2/21 with complaints of weakness, was diagnosed with a UTI and sent home with antibiotics.  He returned 3 days later on 2/24 with gait difficulty and visual complaints for which she had a stroke work-up.  MRI showed an acute stroke in the right PCA territory affecting the right temporal and parietal lobes as well as a small area of the right thalamus.  His NIH stroke scale was 10.  An echocardiogram that showed an ejection fraction of 60% and grade 1 diastolic dysfunction.  EKG was negative for atrial fibrillation.  LDL of 48, hemoglobin A1c was not checked. He was treated with IV ceftriaxone for UTI. He is on atenolol at home for hypertension, but was only on PRN hydralazine at [place] [facility].     Patient current reports trouble with his vision but he can't be more specific. He is a poor historian. He is left hand dominant. He doesn't report  any weakness or paresthesias. He does wear reading glasses and dentures. He denies being hard of hearing and doesn't have hearing aides. He denies any pain. He hasn't moved his bowels since he was hospitalized, no issues at home with constipation. He denies dysuria, though just had a bladder accident.      Patient was evaluated by Rehab Medicine physician and Physical Therapy and Occupational Therapy and determined to be appropriate for acute inpatient rehab and was transferred to [facility] on 2/28/2020  2:11 PM.    Rehab Hospital Course    Right PCA stroke  Left homonymous hemianopsia, continues  Left pronator drift  Dominant  Cognitive deficits, severe  Continue full rehab program  PT/OT/SLP, 1 hr each discipline, 5 days per week  Continue aspirin for secondary stroke prophylaxis  Statin not indicated, LDL 32  HgbA1c 5.5     Encephalopathy, improved  Agitation, resolved  Negative UA x 2, second one +culture, started on antibiotics - sensitivities back, changed to Macrobid  Melatonin, trazodone, and seroquel not effective for his insomnia/sundowning  Scheduled low dose Abilify, in addition to melatonin and trazodone, sleeping well on this combination  Discontinued 1:1 3/11     Mild tachycardia, improved  Likely due to UTI  Continue to monitor     Halitosis  S/p Peridex, oral care     History of gout  Continue allopurinol     History of Immune thrombocytopenia  Continue home medication, Eltrombopag  Monitor labs, stable on recheck x 2     Normocytic anemia  Mild, stable     Vit D deficiency, 15  Continue supplementation     Hypokalemia, resolved  Given one dose of potassium     Bowel  Constipation, continues  Increased bowel meds  Last BM 3/10     Bladder  Check PVRs -59, 149, 50, 96  Not retaining  ICP for over 400 cc  Scheduled toileting      UTI/polyuria  Sent culture, Grp D enterococcus, started on Bactrim, sensitivities back, changed to Macrobid starting 3/12    Labs    Component 4d ago    Significant Indicator POSPositive (POS)    Source UR    Site URINE, CLEAN CATCH    Culture Result -Abnormal     Culture Result Abnormal    Enterococcus faecalis   ,000 cfu/mL     Resulting Agency [facility]   Susceptibility      Enterococcus faecalis     ELISABETH     Ampicillin <=2 mcg/mL Sensitive     Daptomycin 4 mcg/mL Sensitive     Nitrofurantoin <=32 mcg/mL Sensitive     Penicillin 8 mcg/mL Sensitive     Tetracycline <=4 mcg/mL Sensitive     Vancomycin 1 mcg/mL Sensitive         Lab Results   Component Value Date/Time    SODIUM 141 03/10/2020 06:21 AM    POTASSIUM 3.8 03/10/2020 06:21 AM    CHLORIDE 105 03/10/2020 06:21 AM    CO2 28 03/10/2020 06:21 AM    GLUCOSE 117 (H) 03/10/2020 06:21 AM    BUN 14 03/10/2020 06:21 AM    CREATININE 0.89 03/10/2020 06:21 AM      Lab Results   Component Value Date/Time    WBC 4.5 (L) 03/10/2020 06:21 AM    RBC 3.99 (L) 03/10/2020 06:21 AM    HEMOGLOBIN 12.7 (L) 03/10/2020 06:21 AM    HEMATOCRIT 37.1 (L) 03/10/2020 06:21 AM    MCV 93.0 03/10/2020 06:21 AM    MCH 31.8 03/10/2020 06:21 AM    MCHC 34.2 03/10/2020 06:21 AM    MPV 11.8 03/10/2020 06:21 AM    NEUTSPOLYS 71.90 03/10/2020 06:21 AM    LYMPHOCYTES 17.90 (L) 03/10/2020 06:21 AM    MONOCYTES 7.10 03/10/2020 06:21 AM    EOSINOPHILS 2.00 03/10/2020 06:21 AM    BASOPHILS 0.90 03/10/2020 06:21 AM          Functional Status at Discharge  Eatin - Standby Prompting/Supervision or Set-up  Eating Description:  Supervision for safety, Set-up of equipment or meal/tube feeding  Groomin - Standby Prompting/Supervision or Set-up  Grooming Description:  Increased time, Supervision for safety, Verbal cueing, Set-up of equipment  Bathin - Minimal Assistance  Bathing Description:  Grab bar, Tub bench, Hand held shower, Increased time, Supervision for safety, Verbal cueing, Set-up of equipment(per chart review, requires CGA/GB for stanidng balance, close supv d/t impulsivity.)  Upper Body Dressin - Standby Prompting/Supervision  or Set-up  Upper Body Dressing Description:  Supervision for safety, Verbal cueing, Set-up of equipment  Lower Body Dressin - Standby Prompting/Supervsion or Set-up  Lower Body Dressing Description:  5 - Standby Prompting/Supervsion or Set-up     Walk:  4 - Minimal Assistance  Distance Walked:  Walks a minimum of 150 feet  Walk Description:  Walker, Extra time, Safety concerns, Requires incidental assist  Wheelchair:  4 - Minimal Assistance  Distance Propelled:  Propels a minimum of 150 feet   Wheelchair Description:  Extra time, Adaptive equipment, Requires incidental assist, Safety concerns, Verbal cueing  Stairs 4 - Minimal Assistance  Stairs DescriptionAscends/descends 12 to 14 steps, Requires incidental assist, Extra time, Safety concerns, Verbal cueing, Hand rails  Discharge Location: Skilled Nursing Facility  Patient Discharging with Assist of: Other (See Comments)(24 hr care)  Level of Supervision Required Upon Discharge: Twenty Four Hour Supervision  Recommended Equipment for Discharge: Front-Wheeled Walker;Manual Wheelchair  Recommeded Services Upon Discharge: Other (See Comments)(skilled care SNF)  Long Term Goals Met: 0  Long Term Goals Not Met: 6  Reason(s) for Goals Not Met: requiring min A for mobility  Criteria for Termination of Services: Maximum Function Achieved for Inpatient Rehabilitation  Comprehension Mode:  Auditory  Comprehension:  5 - Stand-by Prompting/Supervision or Set-up  Comprehension Description:  Glasses, Verbal cues  Expression Mode:  Vocal  Expression:  5 - Stand-by Prompting/Supervision or Set-up  Expression Description:  Verbal cueing  Social Interaction:  5 - Stand-by Prompting/Supervision or Set-up  Social Interaction Description:  Increased time, Medication, Verbal cues  Problem Solvin - Max Assistance  Problem Solving Description:  Verbal cueing, Increased time, Therapy schedule, Seat belt, Bed/chair alarm  Memory:  2 - Max Assistance  Memory Description:  Verbal  cueing, Seat belt, Therapy schedule, Bed/chair alarm       Discharge Medication:     Medication List      START taking these medications      Instructions   allopurinol 300 MG Tabs  Start taking on:  March 14, 2020  Commonly known as:  ZYLOPRIM   Take 1 Tab by mouth every day.  Dose:  300 mg     aripiprazole 5 MG tablet  Commonly known as:  ABILIFY   Take 0.5 Tabs by mouth every bedtime.  Dose:  2.5 mg     Aspirin 325 MG Tbec  Start taking on:  March 14, 2020   Take 1 Tab by mouth every day.  Dose:  325 mg     hydrOXYzine HCl 50 MG Tabs  Commonly known as:  ATARAX   Take 1 Tab by mouth every 6 hours as needed for Anxiety.  Dose:  50 mg     melatonin 3 MG Tabs   Take 1 Tab by mouth every bedtime.  Dose:  3 mg     nitrofurantoin 100 MG Caps  Commonly known as:  MACROBID   Take 1 Cap by mouth 2 times a day, with meals for 4 days.  Dose:  100 mg     polyethylene glycol/lytes Pack  Start taking on:  March 14, 2020  Commonly known as:  MIRALAX   Take 1 Packet by mouth every day.  Dose:  17 g     Promacta 50 MG Tabs  Start taking on:  March 14, 2020  Generic drug:  Eltrombopag Olamine   Take 1 Tab by mouth every day.  Dose:  1 Tab     QUEtiapine 25 MG Tabs  Commonly known as:  SEROQUEL   Take 1 Tab by mouth every 8 hours as needed (agitation).  Dose:  25 mg     senna-docusate 8.6-50 MG Tabs  Commonly known as:  PERICOLACE or SENOKOT S   Take 2 Tabs by mouth 2 Times a Day.  Dose:  2 Tab     traZODone 50 MG Tabs  Commonly known as:  DESYREL   Take 1 Tab by mouth every bedtime.  Dose:  50 mg     vitamin D 1000 Unit (25 mcg) Tabs  Start taking on:  March 14, 2020  Commonly known as:  cholecalciferol   Take 1 Tab by mouth every day.  Dose:  1,000 Units          Condition on Discharge:  Good.

## 2020-03-13 NOTE — CARE PLAN
Problem: Speech/Swallowing LTGs  Goal: LTG-By discharge, patient will  Description: 1) Individualized goal:  Solve basic to moderate problems related to health and safety using external memory aids when needed with 80% accuracy and MIN A.   2) Interventions:  SLP Self Care / ADL Training  and SLP Cognitive Skill Development, Group Treatment     3/13/2020 1535 by Sarah Haney MS, CCC-SLP  Outcome: DISCHARGED-GOAL NOT MET  3/13/2020 1534 by Sraah Haney MSCCC-SLP  Outcome: NOT MET     Problem: Problem Solving STGs  Goal: STG-Within one week, patient will  Description: 1) Individualized goal:  Complete basic single target scanning activities with 80% accuracy and MIN A.   2) Interventions:  SLP Self Care / ADL Training , SLP Cognitive Skill Development and SLP Group Treatment     3/13/2020 1535 by Sarah Haney MSCCC-SLP  Outcome: DISCHARGED-GOAL NOT MET  3/13/2020 1534 by Sarah Haney MSCCC-SLP  Outcome: NOT MET     Problem: Memory STGs  Goal: STG-Within one week, patient will  Description: 1) Individualized goal:  Utilize external memory aids for orientation to date and daily therapy activities with 80% acc and MOD A.   2) Interventions:  SLP Self Care / ADL Training , SLP Cognitive Skill Development and SLP Group Treatment     3/13/2020 1535 by Sarah Haney MSCCC-SLP  Outcome: DISCHARGED-GOAL NOT MET  3/13/2020 1534 by Sarah Haney MSCCC-SLP  Outcome: NOT MET

## 2020-03-13 NOTE — THERAPY
Speech Language Pathology  Daily Treatment     Patient Name: Chilo Cabrera  Age:  84 y.o., Sex:  male  Medical Record #: [id number]  Today's Date: 3/13/2020     Subjective    Pt cooperative during tx.  Pt very soft spoken.      Objective       03/13/20 1401   Cognition   Orientation  Moderate (3)   Visual Scanning / Cancellation Skills Severe (2)   Interdisciplinary Plan of Care Collaboration   IDT Collaboration with  Family / Caregiver   Collaboration Comments regarding tx, and discharge recommendations.    SLP Total Time Spent   SLP Individual Total Time Spent (Mins) 30   Charge Group   SLP Cognitive Skill Development First 15 Minutes 1   SLP Cognitive Skill Development Additional 15 Minutes 1       Assessment    Orientation: pt oriented to place of residence, reason for hospital stay, and previous occupation.  Pt required mod verbal cues to orient to date, and current place.  Visual scanning (1 target cancellation): 0% maxA.      Plan    Per wife, pt to discharge to SNF.

## 2020-03-13 NOTE — THERAPY
"Recreational Therapy  Daily Treatment     Patient Name: Chilo Cabrera  AGE:  84 y.o., SEX:  male  Medical Record #: [id number]  Today's Date: 3/13/2020       Subjective    \"Where is your mom?\"     Objective       03/13/20 1031   Functional Ability Status - Cognitive   Attention Span Remains on Task with Cueing   Comprehension Follows One Step Commands;Requires Cueing   Judgment Needs Assistance;Confused   Cognitive Comments Max verbal cueing.    Functional Ability Status - Emotional    Affect Appropriate   Mood Appropriate   Behavior Appropriate   Skilled Intervention    Skilled Intervention Cognitive Leisure   Skilled Intervention Comments Placing blocks with holes in them in the correct location.    Interdisciplinary Plan of Care Collaboration   Patient Position at End of Therapy Seated;Other (Comments)  (at the nurses station. )   Treatment Time   Total Time Spent (mins) 30   Procedural Tracking   Procedural Tracking Cognitive Skills Training       Assessment    Pt was given a sorting block activity in which He needed to place a wooden piece with two holes on the place that had two pegs. Pt was able to place four of these pieces in 25 minutes with Max verbal cues for attention to the left.     Plan    Cognitive leisure and focusing on left inattention.   "

## 2020-03-13 NOTE — DISCHARGE PLANNING
Met with patients wife and went over DC plan.  CM did not find an IMM in patients chart.  CM will continue to be available as needs arise.

## 2020-03-13 NOTE — CARE PLAN
Problem: Safety  Goal: Will remain free from injury  Outcome: PROGRESSING SLOWER THAN EXPECTED  Note: Patient continues to be confused and impulsive. Alarms in place.      Problem: Infection  Goal: Will remain free from infection  Outcome: PROGRESSING AS EXPECTED  Note: Patient continues on antibiotic for UTI. No adverse reaction noted.

## 2020-03-14 VITALS
DIASTOLIC BLOOD PRESSURE: 71 MMHG | SYSTOLIC BLOOD PRESSURE: 123 MMHG | WEIGHT: 168.1 LBS | HEIGHT: 77 IN | BODY MASS INDEX: 19.85 KG/M2 | TEMPERATURE: 98.2 F | OXYGEN SATURATION: 98 % | RESPIRATION RATE: 17 BRPM | HEART RATE: 94 BPM

## 2020-03-14 PROCEDURE — A9270 NON-COVERED ITEM OR SERVICE: HCPCS | Performed by: PHYSICAL MEDICINE & REHABILITATION

## 2020-03-14 PROCEDURE — 700102 HCHG RX REV CODE 250 W/ 637 OVERRIDE(OP): Performed by: PHYSICAL MEDICINE & REHABILITATION

## 2020-03-14 PROCEDURE — 99231 SBSQ HOSP IP/OBS SF/LOW 25: CPT | Performed by: PHYSICAL MEDICINE & REHABILITATION

## 2020-03-14 RX ADMIN — SENNOSIDES AND DOCUSATE SODIUM 2 TABLET: 8.6; 5 TABLET ORAL at 08:20

## 2020-03-14 RX ADMIN — NITROFURANTOIN MONOHYDRATE/MACROCRYSTALLINE 100 MG: 25; 75 CAPSULE ORAL at 08:20

## 2020-03-14 RX ADMIN — ASPIRIN 325 MG: 325 TABLET, COATED ORAL at 08:20

## 2020-03-14 RX ADMIN — MELATONIN 2000 UNITS: at 08:20

## 2020-03-14 RX ADMIN — CHLORHEXIDINE GLUCONATE 0.12% ORAL RINSE 15 ML: 1.2 LIQUID ORAL at 08:20

## 2020-03-14 RX ADMIN — POLYETHYLENE GLYCOL 3350 1 PACKET: 17 POWDER, FOR SOLUTION ORAL at 08:20

## 2020-03-14 RX ADMIN — ALLOPURINOL 300 MG: 300 TABLET ORAL at 08:20

## 2020-03-14 ASSESSMENT — ACTIVITIES OF DAILY LIVING (ADL)
TOILET_TRANSFER_LEVEL_OF_ASSIST: REQUIRES SUPERVISION WITH TOILET TRANSFER
SHOWER_TRANSFER_LEVEL_OF_ASSIST: REQUIRES SUPERVISION WITH SHOWER TRANSFER
TOILETING_LEVEL_OF_ASSIST: REQUIRES SUPERVISION WITH TOILETING

## 2020-03-14 NOTE — DISCHARGE INSTRUCTIONS
Helping Someone Who is Suicidal  Suicide is when someone takes his or her own life. Someone who is thinking about suicide needs immediate help. Although you might not know what to say or do to help, start by letting that person know you care. Listen to him or her. Then talk about how to get help. Help is available through therapy, medicine, and other treatments.  What are signs that someone is suicidal?  Common signs include:  · Signs of depression, such as:  ¨ Rage.  ¨ Irritability.  ¨ Shame.  ¨ Excessive worry.  ¨ Loss of interest in things the person once enjoyed.  · Changes in social behaviors and relationships, including:  ¨ Isolating oneself.  ¨ Withdrawing from friends and family.  ¨ Giving away possessions.  ¨ Saying good-bye.  ¨ Acting aggressively.  ¨ Sleeping more or less than usual.  ¨ Having trouble managing school or work.  ¨ Talking about feeling hopeless or being a burden.  ¨ Engaging in risky behaviors, such as drinking more alcohol or using more drugs.  What are the risk factors for suicide?  Risk factors for suicide include:  · Other suicides in the family.  · A history of suicide attempts.  · Depression or other mental health issues.  · Being in shelter or facing shelter time.  · Having had close friends who have committed suicide.  · Alcohol or drug abuse, especially combined with a mental illness.  What should I do if someone is suicidal?  If you believe a person is in immediate danger of committing suicide, call your local emergency services (911 in the [place] for help.  If a person says he or she wants to commit suicide, take the threat seriously. Help the person get help right away by:  · Calling your local emergency services.  · Calling a suicide prevention hotline.  · Contacting a crisis center or a local suicide prevention center. These are often located at hospitals, clinics, community service organizations, social service providers, or health departments.  If a person confides in you that he  or she is considering suicide:  · Listen to the person's thoughts and concerns with compassion.  · Let the person know you will stay with him or her.  · Ask if the person is having thoughts of hurting himself or herself.  · Offer to help the person get to a doctor or mental health professional.  · Remove all weapons and medicines from the person's living space.  · Do not promise to keep his or her thoughts of suicide a secret.  This information is not intended to replace advice given to you by your health care provider. Make sure you discuss any questions you have with your health care provider.  Document Released: 06/23/2004 Document Revised: 05/25/2017 Document Reviewed: 05/28/2015  [employer] Interactive Patient Education © 2017 [employer]      Stroke Prevention  Some health problems and behaviors may make it more likely for you to have a stroke. Below are ways to lessen your risk of having a stroke.  · Be active for at least 30 minutes on most or all days.  · Do not smoke. Try not to be around others who smoke.  · Do not drink too much alcohol.  ¨ Do not have more than 2 drinks a day if you are a man.  ¨ Do not have more than 1 drink a day if you are a woman and are not pregnant.  · Eat healthy foods, such as fruits and vegetables. If you were put on a specific diet, follow the diet as told.  · Keep your cholesterol levels under control through diet and medicines. Look for foods that are low in saturated fat, trans fat, cholesterol, and are high in fiber.  · If you have diabetes, follow all diet plans and take your medicine as told.  · Ask your doctor if you need treatment to lower your blood pressure. If you have high blood pressure (hypertension), follow all diet plans and take your medicine as told by your doctor.  · If you are 18-39 years old, have your blood pressure checked every 3-5 years. If you are age 40 or older, have your blood pressure checked every year.  · Keep a healthy weight. Eat foods that  are low in calories, salt, saturated fat, trans fat, and cholesterol.  · Do not take drugs.  · Avoid birth control pills, if this applies. Talk to your doctor about the risks of taking birth control pills.  · Talk to your doctor if you have sleep problems (sleep apnea).  · Take all medicine as told by your doctor.  ¨ You may be told to take aspirin or blood thinner medicine. Take this medicine as told by your doctor.  ¨ Understand your medicine instructions.  · Make sure any other conditions you have are being taken care of.  Get help right away if:  · You suddenly lose feeling (you feel numb) or have weakness in your face, arm, or leg.  · Your face or eyelid hangs down to one side.  · You suddenly feel confused.  · You have trouble talking (aphasia) or understanding what people are saying.  · You suddenly have trouble seeing in one or both eyes.  · You suddenly have trouble walking.  · You are dizzy.  · You lose your balance or your movements are clumsy (uncoordinated).  · You suddenly have a very bad headache and you do not know the cause.  · You have new chest pain.  · Your heart feels like it is fluttering or skipping a beat (irregular heartbeat).  Do not wait to see if the symptoms above go away. Get help right away. Call your local emergency services (911 in [place] Do not drive yourself to the hospital.   This information is not intended to replace advice given to you by your health care provider. Make sure you discuss any questions you have with your health care provider.  Document Released: 06/18/2013 Document Revised: 05/25/2017 Document Reviewed: 06/20/2014  Elsevier Interactive Patient Education © 2017 [employer]      Fall Prevention in Hospitals, Adult  WHAT ARE SOME SAFETY TIPS FOR PREVENTING FALLS?  If you or a loved one has to stay in the hospital, talk with the health care providers about the risk of falling. Find out which medicines or treatments can cause dizziness or affect balance. Make a plan  with the health care providers to prevent falls. The plan may include these points:  · Ask for help moving around, especially after surgery or when feeling unwell.  · Have support available when getting up and moving around.  · Wear nonskid footwear.  · Use the safety straps on wheelchairs.  · Ask for help to get objects that are out of reach.  · Wear eyeglasses.  · Remove all clutter from the floor and the sides of the bed.  · Keep equipment and wires securely out of the way.  · Keep the bed locked in the low position.  · Keep the side rails up on the bed.  · Keep the nurse call button within reach.  · Keep the door open when no one else is in the room.  · Have someone stay in the hospital with you or your loved one.  · Ask for a bed alarm if you are not able to stay with your loved one who is at risk for getting up without help.  · Ask if sleeping pills or other medicines that alter mental status are necessary.  WHAT INCREASES THE RISK FOR FALLS?  Certain conditions and treatments may increase a patient's risk for falls in a hospital. These include:  · Being in an unfamiliar environment.  · Being on bed rest.  · Having surgery.  · Taking certain medicines, such as sleeping pills.  · Having tubes in place, such as IV lines or catheters.  Additional risk factors for falls in a hospital include:  · Having vision problems.  · Having a change in thinking, feeling, or behavior (altered mental status).  · Having trouble with balance.  · Needing to use the toilet frequently.  · Having fallen in the past three months.  · Having low blood pressure when standing up quickly (orthostatic hypotension).  WHAT DOES THE HOSPITAL STAFF DO TO HELP PREVENT ME OR MY LOVED ONE FROM FALLING?  Hospitals have systems in place to prevent falls and accidents. Talk with the hospital staff about:  · Doing an assessment to discuss fall risks and create a personalized plan to prevent falls.  · Checking in regularly to see if help is needed for  moving around and to assess any changes in fall risk.  · Knowing where the nurse call button is and how to use it. Use this to call a nursing care provider any time.  · Keeping personal items within reach. This includes eyeglasses, phones, and other electronic devices.  · Following general safety guidelines when moving around.  · Keeping the area around the bed free from clutter.  · Removing unnecessary equipment or tubes to reduce the risk of tripping.  · Using safety equipment, such as:  ¨ Walkers, crutches, and other walking devices for support.  ¨ Safety rails on the bed.  ¨ Safety straps in the bed.  ¨ A bed that can be lowered and locked to prevent movement.  ¨ Handrails in the bathroom.  ¨ Nonskid socks and shoes.  ¨ Locking mechanisms to secure equipment in place.  ¨ Lifting and transfer equipment.  WHAT CAN I DO TO HELP PREVENT A FALL?  · Talk with health care providers about fall prevention.  · Have a personalized fall prevention plan in place.  · Do not try to move around if you feel off balance or ill.  · Change position slowly.  · Sit on the side of the bed before standing up.  · Sit down and call for help if you feel dizzy or unsteady when standing.  · Keep the hospital room clear of clutter.  WHEN SHOULD I ASK FOR HELP?  Ask for help whenever you:  · Are not sure if you are able to move around safely.  · Feel dizzy or unsteady.  · Are not comfortable helping your loved one move around or use the bathroom.  If you or a loved one falls, tell the hospital staff. This is important.  This information is not intended to replace advice given to you by your health care provider. Make sure you discuss any questions you have with your health care provider.  Document Released: 12/15/2001 Document Revised: 05/16/2017 Document Reviewed: 09/29/2016  Elsevier Interactive Patient Education © 2017 [employer] Inc[employer]      Physical Therapy Discharge Instructions for Chilo Cabrera    3/13/2020    Level of Assist Required  for Ambulation: Physical Assist on Flat Surfaces, Physical Assist on Curbs, Physical Assist on Stairs  Distance Patient May Ambulate: 150 ft(continue to progress)  Device Recommended for Ambulation: Front-Wheeled Walker  Level of Assist Required for Transfers: Supervision  Device Recommended for Transfers: Front-Wheeled Walker  Home Exercise Program: Refer to Home Exercise Program Handout for Details    Thanks for working hard in PT, it was a pleasure working with you!  rBent Gill, DPT      Speech Therapy Discharge Instructions for Chilo Cabrera    3/13/2020    Diet: Regular (7), Thin (0)  Supervision: 24 hour supervision for safety   At the time of d/c pt continues to present with severe cognitive impairments. Impairments noted in the areas of orientation, attention, visual scanning (left neglect), poor insight and awareness, problem solving and safety with impulsivity. Pt has been working on increased orientation, simple attention/single target visual scanning and functional problem solving related to current abilities and deficits. Continued cognitive intervention is recommended and warranted at this time.     [place], it has been a pleasure working with you. Best luck to you and your continued recovery!     Sarah Chen MS, CCC-SLP    Occupational Therapy Discharge Instructions for Chilo Cabrera    3/14/2020    Level of Assist Required for Eating: Requires Supervision with Eating  Level of Assist Required for Grooming: Requires Supervision with Grooming  Level of Assist Required for Dressing: Requires Supervision with Dressing  Level of Assist Required for Toileting: Requires Supervision with Toileting  Level of Assist Required for Toilet Transfer: Requires Supervision with Toilet Transfer  Equipment for Toilet Transfer: Grab Bars by Toilet  Level of Assist Required for Bathing: Requires Physical Assist with Bathing  Equipment for Bathing: Shower Chair, Grab Bars in Tub / Shower, Hand Held  Shower Head  Level of Assist Required for Shower Transfer: Requires Supervision with Shower Transfer  Equipment for Shower Transfer: Grab Bars in Tub / Shower, Shower Chair  Level of Assist Required for Home Mgmt: Requires Supervision with Home Management    It has been a pleasure working with you, Chilo! We will miss you and wish you all the best!  -Tiana Kirby, OT

## 2020-03-14 NOTE — CARE PLAN
Problem: Communication  Goal: The ability to communicate needs accurately and effectively will improve  Outcome: PROGRESSING AS EXPECTED     Problem: Safety  Goal: Will remain free from injury  Outcome: PROGRESSING AS EXPECTED     Problem: Bowel/Gastric:  Goal: Normal bowel function is maintained or improved  Outcome: PROGRESSING SLOWER THAN EXPECTED  Intervention: Educate patient and significant other/support system about diet, fluid intake, medications and activity to promote bowel function  Note: PRN lactulose administered at hs. Awaiting outcome. PO fluids encouraged.      Problem: Respiratory:  Goal: Respiratory status will improve  Outcome: PROGRESSING AS EXPECTED     Problem: Urinary Elimination:  Goal: Ability to reestablish a normal urinary elimination pattern will improve  Outcome: PROGRESSING AS EXPECTED

## 2020-03-14 NOTE — CARE PLAN
Problem: OT Long Term Goals  Goal: LTG-By discharge, patient will complete basic home management  Description: 1) Individualized Goal:  With min A  2) Interventions:  OT Orthotics Training, OT E Stim Attended, OT Group Therapy, OT Self Care/ADL, OT Cognitive Skill Dev, OT Community Reintegration, OT Manual Ther Technique, OT Neuro Re-Ed/Balance, OT Sensory Int Techniques, OT Therapeutic Activity, OT Ultrasound, OT Evaluation and OT Therapeutic Exercise   Outcome: MET     Problem: Bathing  Goal: STG-Within one week, patient will bathe  Description: 1) Individualized Goal: With supervision and verbal cues,AE/ DME prn.  2) Interventions: OT Orthotics Training, OT E Stim Attended, OT Group Therapy, OT Self Care/ADL, OT Cognitive Skill Dev, OT Community Reintegration, OT Manual Ther Technique, OT Neuro Re-Ed/Balance, OT Sensory Int Techniques, OT Therapeutic Activity, OT Ultrasound, OT Evaluation and OT Therapeutic Exercise      Outcome: MET     Problem: OT Long Term Goals  Goal: LTG-By discharge, patient will complete basic self care tasks  Description: 1) Individualized Goal:  With mod I  2) Interventions:  OT Orthotics Training, OT E Stim Attended, OT Group Therapy, OT Self Care/ADL, OT Cognitive Skill Dev, OT Community Reintegration, OT Manual Ther Technique, OT Neuro Re-Ed/Balance, OT Sensory Int Techniques, OT Therapeutic Activity, OT Ultrasound, OT Evaluation and OT Therapeutic Exercise   Outcome: DISCHARGED-GOAL NOT MET  Note: Requires supervision and verbal cues d/t impulsivity, impaired balance and L visual field cut.     Problem: IADL's  Goal: STG-Within one week, patient will access kitchen area  Description: 1) Individualized Goal: With CGA-supervision and verbal cues,AE/ DME prn.  2) Interventions: OT Orthotics Training, OT E Stim Attended, OT Group Therapy, OT Self Care/ADL, OT Cognitive Skill Dev, OT Community Reintegration, OT Manual Ther Technique, OT Neuro Re-Ed/Balance, OT Sensory Int Techniques, OT  Therapeutic Activity, OT Ultrasound, OT Evaluation and OT Therapeutic Exercise      Outcome: DISCHARGED-GOAL NOT MET  Note: Not formally addressed.

## 2020-03-14 NOTE — PROGRESS NOTES
"Weekend Coverage Rehab Progress Note     Date of Service: 3/14/2020  Chief Complaint: follow up stroke    Interval Events (Subjective)  -Sleeping, but easily arousable  -No new complaints  -Had bowel movement once on 3/13 and once 3/14      Objective:  VITAL SIGNS: /71   Pulse 94   Temp 36.8 °C (98.2 °F) (Oral)   Resp 17   Ht 1.951 m (6' 4.81\")   Wt 76.2 kg (168 lb 1.6 oz)   SpO2 98%   BMI 20.03 kg/m²   Gen: alert, no apparent distress  CV: Well perfused extremities, no peripheral edema  Resp: On room air, normal respiratory effort  GI: soft, no guarding  Neuro/Psych: Appears confused, unable to provide clear answers to simple questions      No results found for this or any previous visit (from the past 72 hour(s)).    Current Facility-Administered Medications   Medication Frequency   • nitrofurantoin (MACROBID) capsule 100 mg BID WITH MEALS   • senna-docusate (PERICOLACE or SENOKOT S) 8.6-50 MG per tablet 2 Tab BID    And   • polyethylene glycol/lytes (MIRALAX) PACKET 1 Packet DAILY    And   • magnesium hydroxide (MILK OF MAGNESIA) suspension 30 mL QDAY PRN    And   • bisacodyl (DULCOLAX) suppository 10 mg QDAY PRN   • aripiprazole (ABILIFY) tablet 2.5 mg QHS   • melatonin tablet 3 mg QHS   • traZODone (DESYREL) tablet 50 mg QHS   • chlorhexidine (PERIDEX) 0.12 % solution 15 mL BID   • QUEtiapine (SEROQUEL) tablet 25 mg Q8HRS PRN   • vitamin D (cholecalciferol) tablet 2,000 Units DAILY   • Respiratory Therapy Consult Continuous RT   • Pharmacy Consult Request ...Pain Management Review 1 Each PHARMACY TO DOSE   • acetaminophen (TYLENOL) tablet 650 mg Q4HRS PRN   • hydrALAZINE (APRESOLINE) tablet 25 mg Q8HRS PRN   • artificial tears ophthalmic solution 1 Drop PRN   • benzocaine-menthol (CEPACOL) lozenge 1 Lozenge Q2HRS PRN   • mag hydrox-al hydrox-simeth (MAALOX PLUS ES or MYLANTA DS) suspension 20 mL Q2HRS PRN   • ondansetron (ZOFRAN ODT) dispertab 4 mg 4X/DAY PRN    Or   • ondansetron (ZOFRAN) " syringe/vial injection 4 mg 4X/DAY PRN   • sodium chloride (OCEAN) 0.65 % nasal spray 2 Spray PRN   • hydrOXYzine HCl (ATARAX) tablet 50 mg Q6HRS PRN   • lactulose 20 GM/30ML solution 30 mL QDAY PRN   • docusate sodium (ENEMEEZ) enema 283 mg QDAY PRN   • Eltrombopag Olamine TABS 50 mg DAILY   • allopurinol (ZYLOPRIM) tablet 300 mg DAILY   • aspirin EC (ECOTRIN) tablet 325 mg DAILY       Orders Placed This Encounter   Procedures   • Diet Order Regular     Standing Status:   Standing     Number of Occurrences:   1     Order Specific Question:   Diet:     Answer:   Regular [1]       Assessment:  Active Hospital Problems    Diagnosis   • *Cerebrovascular accident (CVA) due to thrombosis of right posterior cerebral artery (HCC)   • Vitamin D deficiency   • Visual field cut   • Cognitive deficits   • Essential hypertension   • History of ITP   • History of gout       84 y.o. male admitted for acute inpatient rehabilitation with Cerebrovascular accident (CVA) due to thrombosis of right posterior cerebral artery (HCC).      Medical Decision Making and Plan:    Right PCA stroke, Cognitive deficits, severe  -Neurogenic bladder with constipation, improved as of 3/13  -Neurogenic bladder: PVRs reassuring, stable, ICP PRN    Plan for discharge to SNF later today        Levy Bonner M.D.   Physical Medicine and Rehabilitation

## 2020-03-14 NOTE — PROGRESS NOTES
Patient discharged to [facility] per order.  Discharge instructions reviewed with patient; Pt signed copies placed in chart. Report given to RN in charge over at [facility]. Patient has all belongings and a bottle of home medication; Patient left facility at 1300 via w/c accompanied by rehab staff and hospital transport. Have enjoyed working with this pleasant patient.

## 2020-03-14 NOTE — PROGRESS NOTES
Received patient during shift change, report rec'd from day shift RN. Sitting up in w/c, VS stable on room air. Continent of B&B, stand by assist for transfers. A&O x 1-2, able to make needs known. No s/s of distress.

## 2020-03-30 ENCOUNTER — APPOINTMENT (OUTPATIENT)
Dept: PHYSICAL MEDICINE AND REHAB | Facility: REHABILITATION | Age: 85
End: 2020-03-30
Payer: MEDICARE

## 2022-01-20 NOTE — THERAPY
"Occupational Therapy  Daily Treatment     Patient Name: Chilo Cabrera  Age:  84 y.o., Sex:  male  Medical Record #: [id number]  Today's Date: 3/6/2020     Precautions  Precautions: (P) Fall Risk  Comments: (P) left hemianopsia    Safety   ADL Safety : (P) Impaired, Impulsive, Requires Supervision for Safety, Requires Cueing for Safety, Impaired Insight into Safety  Bathroom Safety: (P) Impaired, Impulsive, Requires Supervision for Safety, Impaired Insight into Safety, Requires Cuing for Safety  Comments: (P) see FIMs for ADL performance details.    Subjective    \"I want to go home\"     Objective       20 0931   Precautions   Precautions Fall Risk   Comments left hemianopsia   Safety    ADL Safety  Impaired;Impulsive;Requires Supervision for Safety;Requires Cueing for Safety;Impaired Insight into Safety   Bathroom Safety Impaired;Impulsive;Requires Supervision for Safety;Impaired Insight into Safety;Requires Cuing for Safety   Comments see FIMs for ADL performance details.   Cognition    Level of Consciousness Alert   Sitting Upper Body Exercises   Upper Extremity Bike Level 3 Resistance  (FluidoBike x5 min fwd, x5 min bwd, 1 RB, cues for pacing)   Interdisciplinary Plan of Care Collaboration   IDT Collaboration with  Nursing   Patient Position at End of Therapy Seated;Self Releasing Lap Belt Applied;Chair Alarm On;Other (Comments)  (seated at nurses station d/t impulsivity)   Collaboration Comments RN re: pt with BM during OT session.   OT Total Time Spent   OT Individual Total Time Spent (Mins) 30   OT Charge Group   OT Self Care / ADL 1   OT Therapeutic Exercise  1       FIM Grooming Score:  6 - Modified Independent  Grooming Description:  Increased time(seated at sink for hand hygiene.)    FIM Toiletin - Standby Prompting/Supervision or Set-up  Toileting Description:  Grab bar, Increased time, Supervision for safety, Verbal cueing    FIM Toilet Transfer Score:  5 - Standby Prompting/Supervision or " Set-up  Toilet Transfer Description:  Grab bar, Increased time, Supervision for safety, Verbal cueing, Set-up of equipment(w/c<>toilet SPT with GB and close SBA.)      Assessment    Pt demonstrates continued visual deficits, but improving with implementing compensatory strategies (head turns) this session with fewer cues. Pt con't to require supervision for safety as he is impulsive and demonstrates impaired insight into deficits.    Plan    Cont'd OT for neuro re-education, vision, balance, strength, endurance, education for safe ADL's and transfers. FT at 1pm.         left pelvic pain for 6 weeks

## 2024-11-22 NOTE — THERAPY
Physical Therapy   Daily Treatment     Patient Name: Chilo Cabrera  Age:  84 y.o., Sex:  male  Medical Record #: [id number]  Today's Date: 3/4/2020     Precautions  Precautions: Fall Risk  Comments: left hemianopsia    Subjective    Pt was seated in w/c upon arrival and agreeable to treatment.  Pt reported that his vision and balance are normal compared to before his stroke.      Objective       03/04/20 0831   Precautions   Precautions Fall Risk   Comments left hemianopsia   Pain 0 - 10 Group   Therapist Pain Assessment Post Activity Pain Same as Prior to Activity;Nurse Notified;0   Bed Mobility    Sit to Supine Supervised   Sit to Stand Contact Guard Assist   Scooting Supervised   Rolling Supervised   Interdisciplinary Plan of Care Collaboration   IDT Collaboration with  Occupational Therapist   Patient Position at End of Therapy In Bed;Call Light within Reach;Tray Table within Reach;Phone within Reach   Collaboration Comments CLOF   PT Total Time Spent   PT Individual Total Time Spent (Mins) 60   PT Charge Group   PT Gait Training 2   PT Neuromuscular Re-Education / Balance 1   PT Therapeutic Activities 1       FIM Walking Score:  2 - Max Assistance  Walking Description:  Extra time, Safety concerns, Verbal cueing, Supervision for safety, Walker(AMB x 125 feet x 3 with FWW and CGA)    Bed mobility with SPV; SPT no AD with CGA.      Continued education provided on visual field cut and compensatory strategies.  Gait training bouts completed: one bout at self selected speed with minor VCing for obstacle avoidence, one bout with obstacles placed in pathway (pt avoided 3 and hit 7 objects), and one bout with VCing for obstacle avoidance with pauses intermittently to scan to L side.      Mirror placed in front of pt with pt instructed in R and L head turns x 10 reps.  Pt with significantly slower HT to L side.  Quick smooth pursuits screening: negative overall, but pt with significantly slower smooth pursuit to upper  Universal Safety Interventions and lower L quadrants.      Assessment    Pt continues to be limited secondary to lack of insight into L homonymous hemianopsia.  Pt with improving compensatory L HT after education, but both L HT and occular movements are slow compared to R side.  Pt would likely benefit from further oculomotor and visual assessment and may also benefit from oculomotor exercises to promote increased ability to follow compensatory strategies for hemianopsia.     Plan    Walk FWW, Vanessa, trial estim, Vector no AD, lite gait for increased speed, dynamic balance challenges, navigating tight spaces/home environment with FWW, L attention and LLE forced use, family training as able,